# Patient Record
Sex: FEMALE | Race: WHITE | NOT HISPANIC OR LATINO | Employment: FULL TIME | URBAN - METROPOLITAN AREA
[De-identification: names, ages, dates, MRNs, and addresses within clinical notes are randomized per-mention and may not be internally consistent; named-entity substitution may affect disease eponyms.]

---

## 2018-06-12 ENCOUNTER — LAB REQUISITION (OUTPATIENT)
Dept: LAB | Facility: HOSPITAL | Age: 33
End: 2018-06-12
Payer: COMMERCIAL

## 2018-06-12 ENCOUNTER — HOSPITAL ENCOUNTER (OUTPATIENT)
Dept: OTHER | Facility: HOSPITAL | Age: 33
Setting detail: OUTPATIENT SURGERY
Discharge: HOME | End: 2018-06-12
Attending: PEDIATRICS

## 2018-06-12 DIAGNOSIS — K64.2 THIRD DEGREE HEMORRHOIDS: ICD-10-CM

## 2018-06-12 LAB
PREGNANCY TEST URINE (HISTORICAL): NEGATIVE
SP GR UR STRIP.AUTO: 1.02 (ref 1–1.03)
SURGICAL PATHOLOGY (HISTORICAL): NORMAL

## 2018-06-12 PROCEDURE — 88304 TISSUE EXAM BY PATHOLOGIST: CPT | Performed by: PATHOLOGY

## 2018-10-11 ENCOUNTER — TELEPHONE (OUTPATIENT)
Dept: SURGERY | Facility: CLINIC | Age: 33
End: 2018-10-11

## 2018-11-27 NOTE — TELEPHONE ENCOUNTER
Left message for patient to call the office re: her procedure (c-scope she cx for 10/25/2018) to see if she wanted to reschedule  If it is not rescheduled prior to 12/06/2018 patient will need to come back in to be seen and resign consent

## 2019-09-19 ENCOUNTER — HOSPITAL ENCOUNTER (EMERGENCY)
Facility: HOSPITAL | Age: 34
Discharge: HOME | End: 2019-09-19
Attending: EMERGENCY MEDICINE

## 2019-09-19 ENCOUNTER — APPOINTMENT (EMERGENCY)
Dept: CT IMAGING | Facility: HOSPITAL | Age: 34
End: 2019-09-19

## 2019-09-19 VITALS
TEMPERATURE: 98.5 F | OXYGEN SATURATION: 99 % | WEIGHT: 106 LBS | RESPIRATION RATE: 18 BRPM | SYSTOLIC BLOOD PRESSURE: 107 MMHG | HEART RATE: 77 BPM | HEIGHT: 59 IN | DIASTOLIC BLOOD PRESSURE: 58 MMHG | BODY MASS INDEX: 21.37 KG/M2

## 2019-09-19 DIAGNOSIS — R11.0 NAUSEA: ICD-10-CM

## 2019-09-19 DIAGNOSIS — R10.9 LEFT FLANK PAIN: Primary | ICD-10-CM

## 2019-09-19 DIAGNOSIS — N83.209 RUPTURED OVARIAN CYST: ICD-10-CM

## 2019-09-19 LAB
ALBUMIN SERPL BCP-MCNC: 4 G/DL (ref 3.5–5)
ALP SERPL-CCNC: 43 U/L (ref 46–116)
ALT SERPL W P-5'-P-CCNC: 8 U/L (ref 12–78)
ANION GAP SERPL CALCULATED.3IONS-SCNC: 9 MMOL/L (ref 4–13)
AST SERPL W P-5'-P-CCNC: 8 U/L (ref 5–45)
BASOPHILS # BLD AUTO: 0.06 THOUSANDS/ΜL (ref 0–0.1)
BASOPHILS NFR BLD AUTO: 1 % (ref 0–1)
BILIRUB SERPL-MCNC: 0.3 MG/DL (ref 0.2–1)
BILIRUB UR QL STRIP: NEGATIVE
BUN SERPL-MCNC: 9 MG/DL (ref 5–25)
CALCIUM SERPL-MCNC: 8.7 MG/DL (ref 8.3–10.1)
CHLORIDE SERPL-SCNC: 102 MMOL/L (ref 100–108)
CLARITY UR: NORMAL
CO2 SERPL-SCNC: 25 MMOL/L (ref 21–32)
COLOR UR: NORMAL
CREAT SERPL-MCNC: 0.69 MG/DL (ref 0.6–1.3)
EOSINOPHIL # BLD AUTO: 0.56 THOUSAND/ΜL (ref 0–0.61)
EOSINOPHIL NFR BLD AUTO: 9 % (ref 0–6)
ERYTHROCYTE [DISTWIDTH] IN BLOOD BY AUTOMATED COUNT: 15.9 % (ref 11.6–15.1)
EXT PREG TEST URINE: NEGATIVE
EXT. CONTROL ED NAV: NORMAL
GFR SERPL CREATININE-BSD FRML MDRD: 115 ML/MIN/1.73SQ M
GLUCOSE SERPL-MCNC: 96 MG/DL (ref 65–140)
GLUCOSE UR STRIP-MCNC: NEGATIVE MG/DL
HCT VFR BLD AUTO: 30.2 % (ref 34.8–46.1)
HGB BLD-MCNC: 9.3 G/DL (ref 11.5–15.4)
HGB UR QL STRIP.AUTO: NEGATIVE
IMM GRANULOCYTES # BLD AUTO: 0.02 THOUSAND/UL (ref 0–0.2)
IMM GRANULOCYTES NFR BLD AUTO: 0 % (ref 0–2)
KETONES UR STRIP-MCNC: NEGATIVE MG/DL
LEUKOCYTE ESTERASE UR QL STRIP: NEGATIVE
LIPASE SERPL-CCNC: 119 U/L (ref 73–393)
LYMPHOCYTES # BLD AUTO: 1.99 THOUSANDS/ΜL (ref 0.6–4.47)
LYMPHOCYTES NFR BLD AUTO: 33 % (ref 14–44)
MCH RBC QN AUTO: 24 PG (ref 26.8–34.3)
MCHC RBC AUTO-ENTMCNC: 30.8 G/DL (ref 31.4–37.4)
MCV RBC AUTO: 78 FL (ref 82–98)
MONOCYTES # BLD AUTO: 0.69 THOUSAND/ΜL (ref 0.17–1.22)
MONOCYTES NFR BLD AUTO: 11 % (ref 4–12)
NEUTROPHILS # BLD AUTO: 2.79 THOUSANDS/ΜL (ref 1.85–7.62)
NEUTS SEG NFR BLD AUTO: 46 % (ref 43–75)
NITRITE UR QL STRIP: NEGATIVE
NRBC BLD AUTO-RTO: 0 /100 WBCS
PH UR STRIP.AUTO: 6.5 [PH]
PLATELET # BLD AUTO: 233 THOUSANDS/UL (ref 149–390)
PMV BLD AUTO: 9.8 FL (ref 8.9–12.7)
POTASSIUM SERPL-SCNC: 3.7 MMOL/L (ref 3.5–5.3)
PROT SERPL-MCNC: 7.5 G/DL (ref 6.4–8.2)
PROT UR STRIP-MCNC: NEGATIVE MG/DL
RBC # BLD AUTO: 3.88 MILLION/UL (ref 3.81–5.12)
SODIUM SERPL-SCNC: 136 MMOL/L (ref 136–145)
SP GR UR STRIP.AUTO: <=1.005 (ref 1–1.03)
TSH SERPL DL<=0.05 MIU/L-ACNC: 381.52 UIU/ML (ref 0.36–3.74)
UROBILINOGEN UR QL STRIP.AUTO: 0.2 E.U./DL
WBC # BLD AUTO: 6.11 THOUSAND/UL (ref 4.31–10.16)

## 2019-09-19 PROCEDURE — 96374 THER/PROPH/DIAG INJ IV PUSH: CPT

## 2019-09-19 PROCEDURE — 81025 URINE PREGNANCY TEST: CPT

## 2019-09-19 PROCEDURE — 99284 EMERGENCY DEPT VISIT MOD MDM: CPT

## 2019-09-19 PROCEDURE — 96375 TX/PRO/DX INJ NEW DRUG ADDON: CPT

## 2019-09-19 PROCEDURE — 81003 URINALYSIS AUTO W/O SCOPE: CPT

## 2019-09-19 PROCEDURE — 99284 EMERGENCY DEPT VISIT MOD MDM: CPT | Performed by: EMERGENCY MEDICINE

## 2019-09-19 PROCEDURE — C9113 INJ PANTOPRAZOLE SODIUM, VIA: HCPCS | Performed by: EMERGENCY MEDICINE

## 2019-09-19 PROCEDURE — 85025 COMPLETE CBC W/AUTO DIFF WBC: CPT

## 2019-09-19 PROCEDURE — 83690 ASSAY OF LIPASE: CPT | Performed by: EMERGENCY MEDICINE

## 2019-09-19 PROCEDURE — 36415 COLL VENOUS BLD VENIPUNCTURE: CPT

## 2019-09-19 PROCEDURE — 84443 ASSAY THYROID STIM HORMONE: CPT | Performed by: EMERGENCY MEDICINE

## 2019-09-19 PROCEDURE — 80053 COMPREHEN METABOLIC PANEL: CPT

## 2019-09-19 PROCEDURE — 74177 CT ABD & PELVIS W/CONTRAST: CPT

## 2019-09-19 PROCEDURE — 96361 HYDRATE IV INFUSION ADD-ON: CPT

## 2019-09-19 RX ORDER — ONDANSETRON 4 MG/1
4 TABLET, ORALLY DISINTEGRATING ORAL EVERY 6 HOURS PRN
Qty: 20 TABLET | Refills: 0 | Status: SHIPPED | OUTPATIENT
Start: 2019-09-19 | End: 2020-10-02 | Stop reason: ALTCHOICE

## 2019-09-19 RX ORDER — KETOROLAC TROMETHAMINE 30 MG/ML
15 INJECTION, SOLUTION INTRAMUSCULAR; INTRAVENOUS ONCE
Status: COMPLETED | OUTPATIENT
Start: 2019-09-19 | End: 2019-09-19

## 2019-09-19 RX ORDER — ONDANSETRON 2 MG/ML
4 INJECTION INTRAMUSCULAR; INTRAVENOUS ONCE
Status: COMPLETED | OUTPATIENT
Start: 2019-09-19 | End: 2019-09-19

## 2019-09-19 RX ORDER — NAPROXEN 500 MG/1
500 TABLET ORAL 2 TIMES DAILY WITH MEALS
Qty: 30 TABLET | Refills: 0 | Status: SHIPPED | OUTPATIENT
Start: 2019-09-19 | End: 2021-06-29

## 2019-09-19 RX ORDER — PANTOPRAZOLE SODIUM 40 MG/1
40 INJECTION, POWDER, FOR SOLUTION INTRAVENOUS ONCE
Status: COMPLETED | OUTPATIENT
Start: 2019-09-19 | End: 2019-09-19

## 2019-09-19 RX ADMIN — IOHEXOL 100 ML: 350 INJECTION, SOLUTION INTRAVENOUS at 19:46

## 2019-09-19 RX ADMIN — KETOROLAC TROMETHAMINE 15 MG: 30 INJECTION, SOLUTION INTRAMUSCULAR at 21:54

## 2019-09-19 RX ADMIN — PANTOPRAZOLE SODIUM 40 MG: 40 INJECTION, POWDER, LYOPHILIZED, FOR SOLUTION INTRAVENOUS at 20:01

## 2019-09-19 RX ADMIN — ONDANSETRON 4 MG: 2 INJECTION INTRAMUSCULAR; INTRAVENOUS at 20:48

## 2019-09-19 RX ADMIN — SODIUM CHLORIDE 1000 ML: 0.9 INJECTION, SOLUTION INTRAVENOUS at 20:00

## 2019-09-19 NOTE — ED PROVIDER NOTES
History  Chief Complaint   Patient presents with    Flank Pain     Patient c/o left sided flank pain that started 3 days ago  Patient is c/o nausea  27-year-old female presents with left-sided flank pain that started 3 days ago, she states that hurts every time she is up and moving around, feels better when she sits and relaxes  Denies urinary discomfort, denies change in bowel habits, no fevers or chills, nausea without vomiting  Decreased appetite  No diarrhea or constipation  Denies trauma to the abdomen  Denies abdominal surgery in the past   Last menstrual period was approximately 1 month ago, and it was normal           None       Past Medical History:   Diagnosis Date    Cardiac disease     pulmonary stenosis    Disease of thyroid gland     hypothyroidism       Past Surgical History:   Procedure Laterality Date    CARDIAC SURGERY      pulmonary stenosis       History reviewed  No pertinent family history  I have reviewed and agree with the history as documented  Social History     Tobacco Use    Smoking status: Never Smoker    Smokeless tobacco: Never Used   Substance Use Topics    Alcohol use: Yes     Comment: social    Drug use: Never        Review of Systems   Constitutional: Positive for appetite change ( decreased)  Negative for chills, fatigue and fever  HENT: Negative for congestion and sore throat  Respiratory: Negative for apnea, cough, chest tightness and shortness of breath  Cardiovascular: Negative for chest pain and palpitations  Gastrointestinal: Positive for abdominal pain (Left flank) and nausea  Negative for constipation, diarrhea and vomiting  Genitourinary: Positive for flank pain  Negative for dysuria, genital sores, pelvic pain, vaginal bleeding, vaginal discharge and vaginal pain  Musculoskeletal: Negative for back pain and neck pain  Skin: Negative for color change and rash  Allergic/Immunologic: Negative for immunocompromised state  Neurological: Negative for dizziness, syncope and headaches  Physical Exam  Physical Exam   Constitutional: She is oriented to person, place, and time  She appears well-developed and well-nourished  No distress  HENT:   Head: Normocephalic and atraumatic  Mouth/Throat: Oropharynx is clear and moist    Eyes: Pupils are equal, round, and reactive to light  Conjunctivae and EOM are normal  Right eye exhibits no discharge  Left eye exhibits no discharge  No scleral icterus  Neck: Normal range of motion  Neck supple  No JVD present  Cardiovascular: Normal rate, regular rhythm, normal heart sounds and intact distal pulses  Exam reveals no gallop and no friction rub  No murmur heard  Pulmonary/Chest: Effort normal and breath sounds normal  No respiratory distress  She has no wheezes  She has no rales  She exhibits no tenderness  Abdominal: Soft  Bowel sounds are normal  She exhibits no distension  There is tenderness (Left flank)  There is no guarding  Musculoskeletal: Normal range of motion  She exhibits no edema, tenderness or deformity  Neurological: She is alert and oriented to person, place, and time  No cranial nerve deficit  Skin: Skin is warm and dry  No rash noted  She is not diaphoretic  No erythema  No pallor  Psychiatric: She has a normal mood and affect  Her behavior is normal    Vitals reviewed        Vital Signs  ED Triage Vitals   Temperature Pulse Respirations Blood Pressure SpO2   09/19/19 1814 09/19/19 1814 09/19/19 1814 09/19/19 1814 09/19/19 1814   98 5 °F (36 9 °C) 77 18 107/58 99 %      Temp Source Heart Rate Source Patient Position - Orthostatic VS BP Location FiO2 (%)   09/19/19 1814 09/19/19 1814 09/19/19 1814 09/19/19 1814 --   Oral Monitor Sitting Left arm       Pain Score       09/19/19 1827       4           Vitals:    09/19/19 1814   BP: 107/58   Pulse: 77   Patient Position - Orthostatic VS: Sitting         Visual Acuity      ED Medications  Medications pantoprazole (PROTONIX) injection 40 mg (40 mg Intravenous Given 9/19/19 2001)   sodium chloride 0 9 % bolus 1,000 mL (0 mL Intravenous Stopped 9/19/19 2152)   iohexol (OMNIPAQUE) 350 MG/ML injection (MULTI-DOSE) 100 mL (100 mL Intravenous Given 9/19/19 1946)   ondansetron (ZOFRAN) injection 4 mg (4 mg Intravenous Given 9/19/19 2048)   ketorolac (TORADOL) injection 15 mg (15 mg Intravenous Given 9/19/19 2154)       Diagnostic Studies  Results Reviewed     Procedure Component Value Units Date/Time    TSH [[id number]]  (Abnormal) Collected:  09/19/19 1852    Lab Status:  Final result Specimen:  Blood from Arm, Right Updated:  09/19/19 2350     TSH 3RD GENERATON 381 525 uIU/mL     Narrative:       Patients undergoing fluorescein dye angiography may retain small amounts of fluorescein in the body for 48-72 hours post procedure  Samples containing fluorescein can produce falsely depressed TSH values  If the patient had this procedure,a specimen should be resubmitted post fluorescein clearance        Lipase [[id number]]  (Normal) Collected:  09/19/19 1852    Lab Status:  Final result Specimen:  Blood from Arm, Right Updated:  09/19/19 1944     Lipase 119 u/L     POCT pregnancy, urine [[id number]]  (Normal) Resulted:  09/19/19 1920    Lab Status:  Final result Updated:  09/19/19 1920     EXT PREG TEST UR (Ref: Negative) negative     Control valid    Comprehensive metabolic panel [[id number]]  (Abnormal) Collected:  09/19/19 1852    Lab Status:  Final result Specimen:  Blood from Arm, Right Updated:  09/19/19 1918     Sodium 136 mmol/L      Potassium 3 7 mmol/L      Chloride 102 mmol/L      CO2 25 mmol/L      ANION GAP 9 mmol/L      BUN 9 mg/dL      Creatinine 0 69 mg/dL      Glucose 96 mg/dL      Calcium 8 7 mg/dL      AST 8 U/L      ALT 8 U/L      Alkaline Phosphatase 43 U/L      Total Protein 7 5 g/dL      Albumin 4 0 g/dL      Total Bilirubin 0 30 mg/dL      eGFR 115 ml/min/1 73sq m     Narrative:       National Kidney Disease Foundation guidelines for Chronic Kidney Disease (CKD):     Stage 1 with normal or high GFR (GFR > 90 mL/min/1 73 square meters)    Stage 2 Mild CKD (GFR = 60-89 mL/min/1 73 square meters)    Stage 3A Moderate CKD (GFR = 45-59 mL/min/1 73 square meters)    Stage 3B Moderate CKD (GFR = 30-44 mL/min/1 73 square meters)    Stage 4 Severe CKD (GFR = 15-29 mL/min/1 73 square meters)    Stage 5 End Stage CKD (GFR <15 mL/min/1 73 square meters)  Note: GFR calculation is accurate only with a steady state creatinine    UA w Reflex to Microscopic w Reflex to Culture [[id number]] Collected:  09/19/19 1852    Lab Status:  Final result Specimen:  Urine, Clean Catch Updated:  09/19/19 1900     Color, UA Light Yellow     Clarity, UA Slightly Cloudy     Specific Gravity, UA <=1 005     pH, UA 6 5     Leukocytes, UA Negative     Nitrite, UA Negative     Protein, UA Negative mg/dl      Glucose, UA Negative mg/dl      Ketones, UA Negative mg/dl      Urobilinogen, UA 0 2 E U /dl      Bilirubin, UA Negative     Blood, UA Negative    CBC and differential [[id number]]  (Abnormal) Collected:  09/19/19 1852    Lab Status:  Final result Specimen:  Blood from Arm, Right Updated:  09/19/19 1859     WBC 6 11 Thousand/uL      RBC 3 88 Million/uL      Hemoglobin 9 3 g/dL      Hematocrit 30 2 %      MCV 78 fL      MCH 24 0 pg      MCHC 30 8 g/dL      RDW 15 9 %      MPV 9 8 fL      Platelets 843 Thousands/uL      nRBC 0 /100 WBCs      Neutrophils Relative 46 %      Immat GRANS % 0 %      Lymphocytes Relative 33 %      Monocytes Relative 11 %      Eosinophils Relative 9 %      Basophils Relative 1 %      Neutrophils Absolute 2 79 Thousands/µL      Immature Grans Absolute 0 02 Thousand/uL      Lymphocytes Absolute 1 99 Thousands/µL      Monocytes Absolute 0 69 Thousand/µL      Eosinophils Absolute 0 56 Thousand/µL      Basophils Absolute 0 06 Thousands/µL                  CT abdomen pelvis with contrast   ED Interpretation by Tonia Cisneros DO Sandeep (09/19 2036)   No acute inflammatory changes in the abdomen or pelvis  A peripherally enhancing 17 mm linear left cystic structure is likely a recently ruptured physiologic follicle  Final Result by Demetra Aleman MD (09/19 2012)      No acute inflammatory changes in the abdomen or pelvis  A peripherally enhancing 17 mm linear left cystic structure is likely a recently ruptured physiologic follicle  Workstation performed: [id number]                    Procedures  Procedures       ED Course  ED Course as of Sep 20 1922   Thu Sep 19, 2019   2001 Lipase: 119   2052 Mild anemia, will advise patient for appropriate outpatient follow-up  Hemoglobin(!): 9 3                               MDM  Number of Diagnoses or Management Options  Left flank pain:   Nausea:   Ruptured ovarian cyst:   Diagnosis management comments: Abdominal pain, nausea  Will evaluate with abdominal lab work, urinalysis, abdominal imaging  Symptomatic treatment  Patient found to have ruptured L ovarian cyst - likely cause of pain  Also could have GI component w gastric/peptic ulcer and offered advice for these and follow up GI  Advised follow up OBGYN for cyst, endometriosis, and advised strict return precautions if worsening, or if signs of torsion, or worsening condition  Discussed with patient and they understood the risks and benefits of discharge  Patient had opportunity to ask questions regarding care and discharge instructions and had no further questions  Advised follow up with PCP, advised returning if worsening, and discussed disease specific return precautions  Patient understood discharge instructions  Patient asked for her thyroid to be checked and TSH is high - she will be advised of this and advised follow up w Endocrinology or PCP for further care             Amount and/or Complexity of Data Reviewed  Clinical lab tests: ordered and reviewed  Tests in the radiology section of CPT®: reviewed and ordered        Disposition  Final diagnoses:   Left flank pain   Ruptured ovarian cyst   Nausea     Time reflects when diagnosis was documented in both MDM as applicable and the Disposition within this note     Time User Action Codes Description Comment    9/19/2019  8:21 PM Coppersmith, Fabiola Dies Add [R10 9] Left flank pain     9/19/2019  9:48 PM Coppersmith, Fabiola Dies Add [N83 209] Ruptured ovarian cyst     9/19/2019  9:48 PM Coppersmith, June Dies Add [R11 0] Nausea       ED Disposition     ED Disposition Condition Date/Time Comment    Discharge Stable u Sep 19, 2019  9:48 PM Raynelle Holiday discharge to home/self care              Follow-up Information     Follow up With Specialties Details Why Contact Info Additional Information    [address] [facility] Emergency Department Emergency Medicine  If symptoms worsen [address] [facility] [address] ED, [place], [place], [address] [place] [address] Shae Vidal MD Obstetrics and Gynecology, Obstetrics, Gynecology Schedule an appointment as soon as possible for a visit  For follow up to ensure improvement, and for further testing and treatment as needed [facility] [facility]  [place] [address]       Artemio Kruger MD Gastroenterology Schedule an appointment as soon as possible for a visit  For follow up to ensure improvement, and for further testing and treatment as needed Todd Anglin [address] [place] [address] [phone]             Discharge Medication List as of 9/19/2019  9:51 PM      START taking these medications    Details   naproxen (NAPROSYN) 500 mg tablet Take 1 tablet (500 mg total) by mouth 2 (two) times a day with meals As needed for abdominal pain, Starting Thu 9/19/2019, Print      ondansetron (ZOFRAN-ODT) 4 mg disintegrating tablet Take 1 tablet (4 mg total) by mouth every 6 (six) hours as needed for nausea or vomiting, Starting Thu 9/19/2019, Print           No discharge procedures on file      ED Provider  Electronically Signed by           Patel Sadler,   09/20/19 Jan Payne DO  09/20/19 1922

## 2019-09-23 NOTE — RESULT ENCOUNTER NOTE
Calls to patient regarding elevated TSH  Left message, no response  Will send certified letter to patient  Will need to be informed of lab results and need outpatient follow up with endocrinology for further evaluation  Rosella Goldmann

## 2019-11-23 ENCOUNTER — TELEPHONE (OUTPATIENT)
Dept: EMERGENCY DEPT | Facility: HOSPITAL | Age: 34
End: 2019-11-23

## 2020-01-22 ENCOUNTER — OFFICE VISIT (OUTPATIENT)
Dept: OBGYN CLINIC | Facility: MEDICAL CENTER | Age: 35
End: 2020-01-22
Payer: COMMERCIAL

## 2020-01-22 VITALS — SYSTOLIC BLOOD PRESSURE: 100 MMHG | DIASTOLIC BLOOD PRESSURE: 62 MMHG | BODY MASS INDEX: 20.2 KG/M2 | WEIGHT: 100 LBS

## 2020-01-22 DIAGNOSIS — Z01.419 ENCOUNTER FOR GYNECOLOGICAL EXAMINATION (GENERAL) (ROUTINE) WITHOUT ABNORMAL FINDINGS: Primary | ICD-10-CM

## 2020-01-22 DIAGNOSIS — Z12.4 ENCOUNTER FOR PAPANICOLAOU SMEAR FOR CERVICAL CANCER SCREENING: ICD-10-CM

## 2020-01-22 DIAGNOSIS — E03.9 HYPOTHYROIDISM, UNSPECIFIED TYPE: ICD-10-CM

## 2020-01-22 DIAGNOSIS — Z30.09 GENERAL COUNSELLING AND ADVICE ON CONTRACEPTION: ICD-10-CM

## 2020-01-22 DIAGNOSIS — Z11.3 SCREENING FOR STD (SEXUALLY TRANSMITTED DISEASE): ICD-10-CM

## 2020-01-22 DIAGNOSIS — Z11.51 SCREENING FOR HPV (HUMAN PAPILLOMAVIRUS): ICD-10-CM

## 2020-01-22 PROCEDURE — 87624 HPV HI-RISK TYP POOLED RSLT: CPT | Performed by: NURSE PRACTITIONER

## 2020-01-22 PROCEDURE — 87591 N.GONORRHOEAE DNA AMP PROB: CPT | Performed by: NURSE PRACTITIONER

## 2020-01-22 PROCEDURE — G0145 SCR C/V CYTO,THINLAYER,RESCR: HCPCS | Performed by: NURSE PRACTITIONER

## 2020-01-22 PROCEDURE — S0610 ANNUAL GYNECOLOGICAL EXAMINA: HCPCS | Performed by: NURSE PRACTITIONER

## 2020-01-22 PROCEDURE — 87491 CHLMYD TRACH DNA AMP PROBE: CPT | Performed by: NURSE PRACTITIONER

## 2020-01-22 RX ORDER — MEDROXYPROGESTERONE ACETATE 150 MG/ML
150 INJECTION, SUSPENSION INTRAMUSCULAR
Qty: 1 ML | Refills: 4 | Status: SHIPPED | OUTPATIENT
Start: 2020-01-22 | End: 2020-09-03 | Stop reason: ALTCHOICE

## 2020-01-22 RX ORDER — LEVOTHYROXINE SODIUM 0.12 MG/1
125 TABLET ORAL DAILY
COMMUNITY
Start: 2016-04-12 | End: 2020-09-04

## 2020-01-22 RX ORDER — ESCITALOPRAM OXALATE 5 MG/1
5 TABLET ORAL
COMMUNITY
End: 2020-09-03

## 2020-01-23 LAB
HPV HR 12 DNA CVX QL NAA+PROBE: NEGATIVE
HPV16 DNA CVX QL NAA+PROBE: NEGATIVE
HPV18 DNA CVX QL NAA+PROBE: NEGATIVE

## 2020-01-24 LAB
C TRACH DNA SPEC QL NAA+PROBE: NEGATIVE
N GONORRHOEA DNA SPEC QL NAA+PROBE: NEGATIVE

## 2020-01-28 LAB
LAB AP GYN PRIMARY INTERPRETATION: NORMAL
Lab: NORMAL

## 2020-01-29 ENCOUNTER — CLINICAL SUPPORT (OUTPATIENT)
Dept: OBGYN CLINIC | Facility: MEDICAL CENTER | Age: 35
End: 2020-01-29
Payer: COMMERCIAL

## 2020-01-29 DIAGNOSIS — Z30.42 ENCOUNTER FOR DEPO-PROVERA CONTRACEPTION: Primary | ICD-10-CM

## 2020-01-29 LAB — SL AMB POCT URINE HCG: NEGATIVE

## 2020-01-29 PROCEDURE — 81025 URINE PREGNANCY TEST: CPT | Performed by: NURSE PRACTITIONER

## 2020-01-29 PROCEDURE — 96372 THER/PROPH/DIAG INJ SC/IM: CPT | Performed by: NURSE PRACTITIONER

## 2020-01-29 RX ORDER — MEDROXYPROGESTERONE ACETATE 150 MG/ML
150 INJECTION, SUSPENSION INTRAMUSCULAR
Status: DISCONTINUED | OUTPATIENT
Start: 2020-01-29 | End: 2020-09-03

## 2020-01-29 RX ADMIN — MEDROXYPROGESTERONE ACETATE 150 MG: 150 INJECTION, SUSPENSION INTRAMUSCULAR at 10:42

## 2020-01-29 NOTE — PROGRESS NOTES
Pt presents for her first depo injection  Urine HCG was negative  Depo given in her right buttock, she did well     Roshan Roper [address] [facility] [id number]  EXP 08/2021

## 2020-01-30 PROBLEM — I37.0 NONRHEUMATIC PULMONARY VALVE STENOSIS: Status: ACTIVE | Noted: 2020-01-30

## 2020-01-30 PROBLEM — Q87.19 NOONAN SYNDROME: Status: ACTIVE | Noted: 2020-01-30

## 2020-01-30 LAB
T4 FREE SERPL-MCNC: 0.3 NG/DL (ref 0.8–1.8)
TSH SERPL-ACNC: >150 MIU/L

## 2020-01-30 NOTE — ASSESSMENT & PLAN NOTE
Normal findings on routine gyn exam  Advised monthly SBE, annual CBE and baseline screening mammo at age 36  Reviewed ASCCP guidelines and recommended pap with cotesting q3 yrs for this low risk patient; this was collected today  STI testing was offered and the patient does agree to gc/chl; she reports low risk  Diet/activity recommendations:  Encouraged daily Ca++ and vitamin D intake as well as daily weight bearing exercise for promotion of bone health  RTO in one year or sooner PRN

## 2020-01-30 NOTE — PROGRESS NOTES
Assessment/Plan:    Hypothyroidism  Known hypothyroidism without recent compliance  Reviewed the importance of consistent management  Order provided for TSH and will advise dose adjustments as indicated  She plans to establish care with an [facility] PCP - recommendations provided for [employer]  General counselling and advice on contraception  This patient presents for contraceptive counseling  We discussed all options at length including barrier methods, combination estrogen progesterone methods (pill, patch and ring), progesterone only methods (pill, Depo, Nexplanon and IUD) and non-hormonal methods (paragard, NFP, sterilization)  We reviewed directions for use, Ses/AEs, risks and benefits  All questions were answered  She is aware that condoms will be advised with all sexual contact for prevention of STI  The patient's personal and FH were reviewed and she is without risk factors for VTE  The patient requests Depo provera  Recommended RTO for dose #1 with menses  She agrees with plan and will use condoms PRN in the meantime     Encounter for gynecological examination (general) (routine) without abnormal findings  Normal findings on routine gyn exam  Advised monthly SBE, annual CBE and baseline screening mammo at age 36  Reviewed ASCCP guidelines and recommended pap with cotesting q3 yrs for this low risk patient; this was collected today  STI testing was offered and the patient does agree to gc/chl; she reports low risk  Diet/activity recommendations:  Encouraged daily Ca++ and vitamin D intake as well as daily weight bearing exercise for promotion of bone health  RTO in one year or sooner PRN  Diagnoses and all orders for this visit:    Encounter for gynecological examination (general) (routine) without abnormal findings    Encounter for Papanicolaou smear for cervical cancer screening  -     Liquid-based pap, screening  -     HPV High Risk;  Future  -     HPV High Risk    Screening for HPV (human papillomavirus)  -     Liquid-based pap, screening  -     HPV High Risk; Future  -     HPV High Risk    Hypothyroidism, unspecified type  -     TSH, 3rd generation with Free T4 reflex; Future    General counselling and advice on contraception  -     medroxyPROGESTERone (DEPO-PROVERA) 150 mg/mL injection; Inject 1 mL (150 mg total) into a muscle every 3 (three) months    Screening for STD (sexually transmitted disease)  -     Chlamydia/GC amplified DNA by PCR    Other orders  -     escitalopram (LEXAPRO) 5 mg tablet; Take 5 mg by mouth  -     levothyroxine 125 mcg tablet; Take 125 mcg by mouth daily          Subjective:      Patient ID: Patric Payne is a 29 y o  female  This new patient presents for routine annual gyn exam  Transferring care from Dr Eugenie Riddle Mer is a 29 y o  G0  PMHx notable for [place] syndrome, pulmonary stenosis (multiple cardiac procedures), hypothyroidism, anxiety/depression  Gyn hx is benign - she denies abn pap or STI  FH neg for breast, ovarian or colon ca  Interested in discussing contraceptive options  Used plan B last week  Has tried Mirena, OCP and ring - thinking about Depo  She denies acute gyn complaints  Menses are regular and light to mod  She denies pelvic pain, breast concerns, abnormal discharge, bowel/bladder dysfunction  Monogamous  She denies STI concerns but open to testing  Works as a hairstylist       The following portions of the patient's history were reviewed and updated as appropriate: allergies, current medications, past family history, past medical history, past social history, past surgical history and problem list     Review of Systems   Constitutional: Negative  Respiratory: Negative  Cardiovascular: Negative  Gastrointestinal: Negative  Genitourinary: Negative  Musculoskeletal: Negative  Skin: Negative  Neurological: Negative  Psychiatric/Behavioral: Negative            Objective:      /62   Wt 45 4 kg (100 lb)   LMP 01/22/2020   BMI 20 20 kg/m²          Physical Exam   Constitutional: She is oriented to person, place, and time  She appears well-developed and well-nourished  HENT:   Head: Normocephalic and atraumatic  Eyes: Pupils are equal, round, and reactive to light  EOM are normal    Neck: Normal range of motion  Neck supple  No thyromegaly present  Cardiovascular: Normal rate, regular rhythm and normal heart sounds  Pulmonary/Chest: Effort normal and breath sounds normal  No respiratory distress  She has no wheezes  She has no rales  She exhibits deformity  She exhibits no mass and no tenderness  Right breast exhibits no inverted nipple, no mass, no nipple discharge, no skin change and no tenderness  Left breast exhibits no inverted nipple, no mass, no nipple discharge, no skin change and no tenderness  No breast tenderness or discharge  Breasts are symmetrical        Abdominal: Soft  She exhibits no distension and no mass  There is no splenomegaly or hepatomegaly  There is no tenderness  There is no rebound and no guarding  Genitourinary: Rectum normal, vagina normal and uterus normal  No breast tenderness or discharge  No labial fusion  There is no rash, tenderness, lesion or injury on the right labia  There is no rash, tenderness, lesion or injury on the left labia  Cervix exhibits no motion tenderness, no discharge and no friability  Right adnexum displays no mass, no tenderness and no fullness  Left adnexum displays no mass, no tenderness and no fullness  No erythema, tenderness or bleeding in the vagina  No foreign body in the vagina  No vaginal discharge found  Musculoskeletal: Normal range of motion  Lymphadenopathy:     She has no cervical adenopathy  She has no axillary adenopathy  Neurological: She is alert and oriented to person, place, and time  No cranial nerve deficit  Skin: Skin is warm and dry  No rash noted  No cyanosis  Nails show no clubbing     Psychiatric: She has a normal mood and affect   Her speech is normal and behavior is normal  Judgment and thought content normal  Cognition and memory are normal

## 2020-01-30 NOTE — ASSESSMENT & PLAN NOTE
Known hypothyroidism without recent compliance  Reviewed the importance of consistent management  Order provided for TSH and will advise dose adjustments as indicated  She plans to establish care with an [facility] PCP - recommendations provided for [employer]

## 2020-01-31 DIAGNOSIS — R79.89 ELEVATED TSH: ICD-10-CM

## 2020-01-31 DIAGNOSIS — E05.90 HYPERTHYROIDISM: Primary | ICD-10-CM

## 2020-01-31 RX ORDER — LEVOTHYROXINE SODIUM 0.12 MG/1
125 TABLET ORAL DAILY
Qty: 30 TABLET | Refills: 1 | Status: CANCELLED | OUTPATIENT
Start: 2020-01-31 | End: 2020-03-01

## 2020-01-31 NOTE — TELEPHONE ENCOUNTER
----- Message from Jessica Pittman, [address] sent at 1/31/2020 10:02 AM EST -----  TSH is very elevated   Please notify patient   She had reported recently restarting synthroid - please ask how long she has been taking 125mcg/d and advise repeating TSH 4 weeks later (please provide order for this)  Please encourage establishing care with PCP asap for further management   If she would like referral to Endocrine within [facility] we can provide that as well - please provide referral to Dr Cordova Player

## 2020-01-31 NOTE — TELEPHONE ENCOUNTER
Spoke with pt, informed re tsh, she will restart synthroid  And repeat tsh in 4 weeks  Referral sent for endocrinology

## 2020-02-03 RX ORDER — LEVOTHYROXINE SODIUM 0.12 MG/1
125 TABLET ORAL DAILY
Qty: 30 TABLET | Refills: 10 | Status: CANCELLED | OUTPATIENT
Start: 2020-02-03 | End: 2020-03-04

## 2020-03-03 ENCOUNTER — TELEPHONE (OUTPATIENT)
Dept: OBGYN CLINIC | Facility: CLINIC | Age: 35
End: 2020-03-03

## 2020-03-03 NOTE — TELEPHONE ENCOUNTER
Pt started on depo provera about a month ago and has been lightly spotting every day  Please Advise

## 2020-04-02 ENCOUNTER — TELEPHONE (OUTPATIENT)
Dept: OBGYN CLINIC | Facility: CLINIC | Age: 35
End: 2020-04-02

## 2020-04-09 ENCOUNTER — TELEMEDICINE (OUTPATIENT)
Dept: OBGYN CLINIC | Facility: MEDICAL CENTER | Age: 35
End: 2020-04-09
Payer: COMMERCIAL

## 2020-04-09 DIAGNOSIS — Z30.09 GENERAL COUNSELING AND ADVICE FOR CONTRACEPTIVE MANAGEMENT: ICD-10-CM

## 2020-04-09 DIAGNOSIS — Z30.09 GENERAL COUNSELLING AND ADVICE ON CONTRACEPTION: Primary | ICD-10-CM

## 2020-04-09 DIAGNOSIS — E03.9 HYPOTHYROIDISM, UNSPECIFIED TYPE: ICD-10-CM

## 2020-04-09 PROCEDURE — 99213 OFFICE O/P EST LOW 20 MIN: CPT | Performed by: NURSE PRACTITIONER

## 2020-04-09 RX ORDER — NORETHINDRONE ACETATE AND ETHINYL ESTRADIOL AND FERROUS FUMARATE 1MG-20(21)
KIT ORAL
Qty: 90 TABLET | Refills: 4 | Status: SHIPPED | OUTPATIENT
Start: 2020-04-09 | End: 2021-06-29

## 2020-09-03 ENCOUNTER — APPOINTMENT (OUTPATIENT)
Dept: LAB | Facility: MEDICAL CENTER | Age: 35
End: 2020-09-03
Payer: COMMERCIAL

## 2020-09-03 ENCOUNTER — APPOINTMENT (OUTPATIENT)
Dept: RADIOLOGY | Facility: MEDICAL CENTER | Age: 35
End: 2020-09-03
Payer: COMMERCIAL

## 2020-09-03 ENCOUNTER — OFFICE VISIT (OUTPATIENT)
Dept: FAMILY MEDICINE CLINIC | Facility: MEDICAL CENTER | Age: 35
End: 2020-09-03
Payer: COMMERCIAL

## 2020-09-03 VITALS
HEART RATE: 67 BPM | TEMPERATURE: 98.4 F | BODY MASS INDEX: 19.63 KG/M2 | SYSTOLIC BLOOD PRESSURE: 104 MMHG | DIASTOLIC BLOOD PRESSURE: 68 MMHG | WEIGHT: 104 LBS | HEIGHT: 61 IN

## 2020-09-03 DIAGNOSIS — M54.16 LUMBAR BACK PAIN WITH RADICULOPATHY AFFECTING LEFT LOWER EXTREMITY: ICD-10-CM

## 2020-09-03 DIAGNOSIS — M54.16 LUMBAR BACK PAIN WITH RADICULOPATHY AFFECTING RIGHT LOWER EXTREMITY: ICD-10-CM

## 2020-09-03 DIAGNOSIS — Z13.1 SCREENING FOR DIABETES MELLITUS: ICD-10-CM

## 2020-09-03 DIAGNOSIS — Z00.00 PHYSICAL EXAM, ANNUAL: ICD-10-CM

## 2020-09-03 DIAGNOSIS — E03.9 HYPOTHYROIDISM, UNSPECIFIED TYPE: ICD-10-CM

## 2020-09-03 DIAGNOSIS — G89.29 CHRONIC BILATERAL THORACIC BACK PAIN: ICD-10-CM

## 2020-09-03 DIAGNOSIS — Z23 ENCOUNTER FOR IMMUNIZATION: ICD-10-CM

## 2020-09-03 DIAGNOSIS — Z11.59 NEED FOR HEPATITIS C SCREENING TEST: ICD-10-CM

## 2020-09-03 DIAGNOSIS — F32.9 MAJOR DEPRESSIVE DISORDER, REMISSION STATUS UNSPECIFIED, UNSPECIFIED WHETHER RECURRENT: Primary | ICD-10-CM

## 2020-09-03 DIAGNOSIS — Z13.220 SCREENING FOR LIPID DISORDERS: ICD-10-CM

## 2020-09-03 DIAGNOSIS — Z11.4 SCREENING FOR HIV (HUMAN IMMUNODEFICIENCY VIRUS): ICD-10-CM

## 2020-09-03 DIAGNOSIS — R79.89 ELEVATED TSH: ICD-10-CM

## 2020-09-03 DIAGNOSIS — M54.2 CERVICAL PAIN (NECK): ICD-10-CM

## 2020-09-03 DIAGNOSIS — Q87.19 NOONAN SYNDROME: ICD-10-CM

## 2020-09-03 DIAGNOSIS — F41.9 ANXIETY: ICD-10-CM

## 2020-09-03 DIAGNOSIS — Q24.9 CONGENITAL HEART DISEASE: ICD-10-CM

## 2020-09-03 DIAGNOSIS — M54.6 CHRONIC BILATERAL THORACIC BACK PAIN: ICD-10-CM

## 2020-09-03 DIAGNOSIS — H02.402 DROOPY EYELID, LEFT: ICD-10-CM

## 2020-09-03 PROBLEM — F32.A DEPRESSION: Status: ACTIVE | Noted: 2020-09-03

## 2020-09-03 LAB
ALBUMIN SERPL BCP-MCNC: 4.2 G/DL (ref 3.5–5)
ALP SERPL-CCNC: 29 U/L (ref 46–116)
ALT SERPL W P-5'-P-CCNC: 17 U/L (ref 12–78)
ANION GAP SERPL CALCULATED.3IONS-SCNC: 6 MMOL/L (ref 4–13)
AST SERPL W P-5'-P-CCNC: 11 U/L (ref 5–45)
BILIRUB SERPL-MCNC: 0.47 MG/DL (ref 0.2–1)
BUN SERPL-MCNC: 14 MG/DL (ref 5–25)
CALCIUM SERPL-MCNC: 8.9 MG/DL (ref 8.3–10.1)
CHLORIDE SERPL-SCNC: 107 MMOL/L (ref 100–108)
CHOLEST SERPL-MCNC: 262 MG/DL (ref 50–200)
CO2 SERPL-SCNC: 25 MMOL/L (ref 21–32)
CREAT SERPL-MCNC: 0.85 MG/DL (ref 0.6–1.3)
EST. AVERAGE GLUCOSE BLD GHB EST-MCNC: 131 MG/DL
GFR SERPL CREATININE-BSD FRML MDRD: 90 ML/MIN/1.73SQ M
GLUCOSE P FAST SERPL-MCNC: 80 MG/DL (ref 65–99)
HBA1C MFR BLD: 6.2 %
HCV AB SER QL: NORMAL
HDLC SERPL-MCNC: 53 MG/DL
LDLC SERPL CALC-MCNC: 183 MG/DL (ref 0–100)
POTASSIUM SERPL-SCNC: 3.7 MMOL/L (ref 3.5–5.3)
PROT SERPL-MCNC: 8.1 G/DL (ref 6.4–8.2)
SODIUM SERPL-SCNC: 138 MMOL/L (ref 136–145)
T4 FREE SERPL-MCNC: 0.34 NG/DL (ref 0.76–1.46)
TRIGL SERPL-MCNC: 130 MG/DL
TSH SERPL DL<=0.05 MIU/L-ACNC: 295 UIU/ML (ref 0.36–3.74)

## 2020-09-03 PROCEDURE — 80053 COMPREHEN METABOLIC PANEL: CPT

## 2020-09-03 PROCEDURE — 72072 X-RAY EXAM THORAC SPINE 3VWS: CPT

## 2020-09-03 PROCEDURE — 72110 X-RAY EXAM L-2 SPINE 4/>VWS: CPT

## 2020-09-03 PROCEDURE — 86376 MICROSOMAL ANTIBODY EACH: CPT

## 2020-09-03 PROCEDURE — 99204 OFFICE O/P NEW MOD 45 MIN: CPT | Performed by: FAMILY MEDICINE

## 2020-09-03 PROCEDURE — 36415 COLL VENOUS BLD VENIPUNCTURE: CPT

## 2020-09-03 PROCEDURE — 72050 X-RAY EXAM NECK SPINE 4/5VWS: CPT

## 2020-09-03 PROCEDURE — 84443 ASSAY THYROID STIM HORMONE: CPT

## 2020-09-03 PROCEDURE — 99395 PREV VISIT EST AGE 18-39: CPT | Performed by: FAMILY MEDICINE

## 2020-09-03 PROCEDURE — 80061 LIPID PANEL: CPT

## 2020-09-03 PROCEDURE — 87389 HIV-1 AG W/HIV-1&-2 AB AG IA: CPT

## 2020-09-03 PROCEDURE — 84439 ASSAY OF FREE THYROXINE: CPT

## 2020-09-03 PROCEDURE — 83036 HEMOGLOBIN GLYCOSYLATED A1C: CPT

## 2020-09-03 PROCEDURE — 86800 THYROGLOBULIN ANTIBODY: CPT

## 2020-09-03 PROCEDURE — 86803 HEPATITIS C AB TEST: CPT

## 2020-09-03 RX ORDER — ESCITALOPRAM OXALATE 5 MG/1
5 TABLET ORAL DAILY
Qty: 30 TABLET | Refills: 1 | Status: SHIPPED | OUTPATIENT
Start: 2020-09-03 | End: 2020-10-29

## 2020-09-03 RX ORDER — RANITIDINE 150 MG/1
1 TABLET ORAL 2 TIMES DAILY
COMMUNITY
Start: 2016-04-12 | End: 2020-09-03 | Stop reason: ALTCHOICE

## 2020-09-03 NOTE — PROGRESS NOTES
Assessment/Plan:       Diagnoses and all orders for this visit:      Physical exam, annual  -     Lipid Panel with Direct LDL reflex; Future  -     Comprehensive metabolic panel; Future  -     Hemoglobin A1C; Future  -     TSH, 3rd generation; Future  -     T4, free; Future  72-year-old female presenting for a physical exam   Continue regular follow-up with Gynecology  Healthy diet with junk food avoidance recommended  Continue with physical activity  Continue avoidance of tobacco and drugs  Current amount of alcohol use acceptable  Screening for lipid disorders  -     Lipid Panel with Direct LDL reflex; Future  Screening for diabetes mellitus  -     Comprehensive metabolic panel; Future  -     Hemoglobin A1C; Future  Check labs to screen for lipid disorders and diabetes  Need for hepatitis C screening test  -     Hepatitis C antibody; Future  Screening for HIV (human immunodeficiency virus)  -     HIV 1/2 Antigen/Antibody (4th Generation) w Reflex [facility]; Future  Patient did give verbal consent for hep C and HIV screening  Encounter for immunization  Flu vaccine highly recommended at the end of September but no later than Thanksgiving due to congenital heart disease  Patient can schedule a visit in the office during one of our flu clinics or get the vaccine at her local pharmacy  [place] syndrome  Congenital heart disease  Continue regular follow-up with Cardiology  It appears patient has not seen cardiology in about two years  I encouraged her to make an appointment  Follow-up in one month or sooner if needed  Subjective:      Patient ID: Rod Ayala is a 29 y o  female  Patient presents for a physical exam   States she eats healthy but eats junk food as well  She states physically active  No tobacco or drug use  Social alcohol use  Patient does have a gynecologist   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    Patient did have cardiac surgery and is followed by Cardiology  Agreeable to blood work  The following portions of the patient's history were reviewed and updated as appropriate: She  has a past medical history of Cardiac disease, Disease of thyroid gland, [place] syndrome, and Pulmonary stenosis  She   Patient Active Problem List    Diagnosis Date Noted    Depression 09/03/2020    Droopy eyelid, left 09/03/2020    Cervical pain (neck) 09/03/2020    Chronic bilateral thoracic back pain 09/03/2020    Lumbar back pain with radiculopathy affecting left lower extremity 09/03/2020    Lumbar back pain with radiculopathy affecting right lower extremity 09/03/2020    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    Nonrheumatic pulmonary valve stenosis 01/30/2020    General counselling and advice on contraception 01/22/2020    Hypothyroidism 01/22/2020    Encounter for gynecological examination (general) (routine) without abnormal findings 01/22/2020    Congenital heart disease 09/06/2018    Anxiety 04/12/2016     She  has a past surgical history that includes Cardiac surgery  Her family history includes Diabetes in her father; Thyroid disease in her mother  She  reports that she has never smoked  She has never used smokeless tobacco  She reports current alcohol use  She reports that she does not use drugs    Current Outpatient Medications   Medication Sig Dispense Refill    JUNEL FE 1/20 1-20 MG-MCG per tablet Please specify directions, refills and quantity 90 tablet 4    naproxen (NAPROSYN) 500 mg tablet Take 1 tablet (500 mg total) by mouth 2 (two) times a day with meals As needed for abdominal pain 30 tablet 0    ondansetron (ZOFRAN-ODT) 4 mg disintegrating tablet Take 1 tablet (4 mg total) by mouth every 6 (six) hours as needed for nausea or vomiting 20 tablet 0    escitalopram (LEXAPRO) 5 mg tablet Take 1 tablet (5 mg total) by mouth daily 30 tablet 1    levothyroxine 125 mcg tablet Take 125 mcg by mouth daily       No current facility-administered medications for this visit  Current Outpatient Medications on File Prior to Visit   Medication Sig    JUNEL FE 1/20 1-20 MG-MCG per tablet Please specify directions, refills and quantity    naproxen (NAPROSYN) 500 mg tablet Take 1 tablet (500 mg total) by mouth 2 (two) times a day with meals As needed for abdominal pain    ondansetron (ZOFRAN-ODT) 4 mg disintegrating tablet Take 1 tablet (4 mg total) by mouth every 6 (six) hours as needed for nausea or vomiting    [DISCONTINUED] ranitidine (ZANTAC) 150 mg tablet Take 1 tablet by mouth 2 (two) times a day    levothyroxine 125 mcg tablet Take 125 mcg by mouth daily    [DISCONTINUED] escitalopram (LEXAPRO) 5 mg tablet Take 5 mg by mouth    [DISCONTINUED] medroxyPROGESTERone (DEPO-PROVERA) 150 mg/mL injection Inject 1 mL (150 mg total) into a muscle every 3 (three) months     Current Facility-Administered Medications on File Prior to Visit   Medication    [DISCONTINUED] medroxyPROGESTERone (DEPO-PROVERA) IM injection 150 mg     She is allergic to bee venom and pollen extract       Review of Systems   Constitutional: Negative for fever  HENT: Negative for ear pain, rhinorrhea and sore throat  Eyes: Positive for visual disturbance  Respiratory: Negative for shortness of breath  Cardiovascular: Negative for chest pain  Gastrointestinal: Negative for abdominal pain and blood in stool  Endocrine: Positive for cold intolerance  Genitourinary: Negative for dysuria and hematuria  Musculoskeletal: Positive for arthralgias, back pain, myalgias and neck pain  Skin: Negative for rash  Neurological: Positive for numbness  Negative for headaches  Psychiatric/Behavioral: Positive for dysphoric mood  The patient is nervous/anxious            Objective:      /68 (BP Location: Left arm, Patient Position: Sitting, Cuff Size: Adult)   Pulse 67   Temp 98 4 °F (36 9 °C)   Ht 5' 0 5" (1 537 m)   Wt 47 2 kg (104 lb)   BMI 19 98 kg/m²          Physical Exam  Constitutional:       Appearance: She is well-developed  Comments: No nose, mouth or throat complaints  Nose, mouth and throat not examined  Mask in place  COVID-19 pandemic  HENT:      Head: Normocephalic and atraumatic  Right Ear: Tympanic membrane, ear canal and external ear normal       Left Ear: Tympanic membrane, ear canal and external ear normal    Eyes:      Conjunctiva/sclera: Conjunctivae normal       Pupils: Pupils are equal, round, and reactive to light  Comments: Left eyelid droop  Neck:      Trachea: Trachea normal    Cardiovascular:      Rate and Rhythm: Normal rate and regular rhythm  Heart sounds: S1 normal and S2 normal  Murmur present  Pulmonary:      Effort: Pulmonary effort is normal       Breath sounds: Normal breath sounds  Chest:       Abdominal:      General: Bowel sounds are normal       Palpations: Abdomen is soft  Tenderness: There is no abdominal tenderness  Musculoskeletal: Normal range of motion  Cervical back: She exhibits spasm  Thoracic back: She exhibits spasm  Lumbar back: She exhibits spasm  Comments: Pronounced cervical lordosis with thoracic kyphosis  Skin:     General: Skin is warm and dry  Neurological:      Mental Status: She is alert and oriented to person, place, and time  Psychiatric:         Speech: Speech normal          Behavior: Behavior normal          Thought Content:  Thought content normal

## 2020-09-03 NOTE — PROGRESS NOTES
Assessment/Plan:       Diagnoses and all orders for this visit:    Major depressive disorder, remission status unspecified, unspecified whether recurrent  -     escitalopram (LEXAPRO) 5 mg tablet; Take 1 tablet (5 mg total) by mouth daily  -     Ambulatory referral to [facility]; Future  Depression not well controlled  Patient is not suicidal   I called her pharmacy and her last Lexapro refill was February of 2019  I informed patient she will need to take the medication every day in order for it to be effective  She acknowledged understanding  Restart Lexapro 5 mg daily  Counseling recommended as well and patient is agreeable  Referral placed for Behavioral Health through [facility]  Anxiety  -     escitalopram (LEXAPRO) 5 mg tablet; Take 1 tablet (5 mg total) by mouth daily  -     Ambulatory referral to [facility]; Future  Anxiety is not well controlled  Patient was instructed she will need to take the medication every day and not as needed to help with her anxiety  She was in agreement  Restart Lexapro 5 mg daily  Referral to behavior health as well  Hypothyroidism, unspecified type  -     TSH, 3rd generation; Future  -     T4, free; Future  -     Thyroid Antibodies Panel; Future  Check thyroid labs  Restart levothyroxine if needed based on results  Patient was educated today and had to take thyroid medication appropriately  She must take the medication 1st thing in the morning on an empty stomach  She must take the medication with a full 6 oz or more of water  Nothing else to drink but water for 1 hour  No additional medication for 1 hour  No eating for 1 hour  Patient knowledge understanding  Will follow up on lab results  Droopy eyelid, left  -     Ambulatory referral to Plastic Surgery; Future  Cervical pain (neck)  -     XR spine cervical complete 4 or 5 vw non injury;  Future  Chronic bilateral thoracic back pain  -     XR spine thoracic 3 vw; Future  Lumbar back pain with radiculopathy affecting left lower extremity  -     XR spine lumbar minimum 4 views non injury; Future  Lumbar back pain with radiculopathy affecting right lower extremity  -     XR spine lumbar minimum 4 views non injury; Future  I believe patient has drooping left eyelid is causing her neck pain, thoracic pain, lumbar pain and her leg symptoms  Due to the drooping of her eyelid she is leaning forward through her thoracic spine and also extending her cervical spine in order to be able to see me  Doing this chronically is likely putting her paravertebral muscles in the cervical region, thoracic region and lumbar region in to spasm thus causing pain  This is likely also adding to her radiculopathy as well  She would likely greatly benefit from surgery to correct the drooping eyelid  Therefore I will have her see Plastic surgery for evaluation  Follow-up in one month or sooner if needed  Subjective:      Patient ID: Mj Nolasco is a 29 y o  female  Patient presents to establish care  She complains of a left drooping eyelid  She states this has been from birth  It is affecting her ability to see  She would be interested in having surgery to correct the dripping of the eye  She also complains of back pain  Location is the upper, middle and lower back on both sides  Essentially she states her back hurts from the neck down  This has been going on for many years  Patient states she has had physical therapy and imaging  Physical therapy has not been helpful  Imaging has been unremarkable  The pain in her back radiates down both of her legs  She has leg numbness particularly if she sits too long  Her legs are restless in the evening  Leg discomfort improves with movement  She also tells me she has depression  No suicidal ideation  This has been going on for many years but has been worsening over the past 1-2 years    Patient states she is supposed to be on Lexapro but admits she is not taking the medication regularly  She last saw her PCP about two years ago  She also tells me she has anxiety  This has been going on for many years as well but also worsening the past 1-2 years  She finds that taking Lexapro as needed does help  Is running out of the prescription which she received about two years ago  She has hypothyroidism  She was diagnosed at the age of 25 or 23  She is supposed to be on levothyroxine but admits she has not been taking the medication for about two years  The following portions of the patient's history were reviewed and updated as appropriate: She  has a past medical history of Cardiac disease, Disease of thyroid gland, [place] syndrome, and Pulmonary stenosis  She   Patient Active Problem List    Diagnosis Date Noted    Depression 09/03/2020    Droopy eyelid, left 09/03/2020    Cervical pain (neck) 09/03/2020    Chronic bilateral thoracic back pain 09/03/2020    Lumbar back pain with radiculopathy affecting left lower extremity 09/03/2020    Lumbar back pain with radiculopathy affecting right lower extremity 09/03/2020    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    Nonrheumatic pulmonary valve stenosis 01/30/2020    General counselling and advice on contraception 01/22/2020    Hypothyroidism 01/22/2020    Encounter for gynecological examination (general) (routine) without abnormal findings 01/22/2020    Congenital heart disease 09/06/2018    Anxiety 04/12/2016     She  has a past surgical history that includes Cardiac surgery  Her family history includes Diabetes in her father; Thyroid disease in her mother  She  reports that she has never smoked  She has never used smokeless tobacco  She reports current alcohol use  She reports that she does not use drugs    Current Outpatient Medications   Medication Sig Dispense Refill    JUNEL FE 1/20 1-20 MG-MCG per tablet Please specify directions, refills and quantity 90 tablet 4    naproxen (NAPROSYN) 500 mg tablet Take 1 tablet (500 mg total) by mouth 2 (two) times a day with meals As needed for abdominal pain 30 tablet 0    ondansetron (ZOFRAN-ODT) 4 mg disintegrating tablet Take 1 tablet (4 mg total) by mouth every 6 (six) hours as needed for nausea or vomiting 20 tablet 0    escitalopram (LEXAPRO) 5 mg tablet Take 1 tablet (5 mg total) by mouth daily 30 tablet 1    levothyroxine 125 mcg tablet Take 125 mcg by mouth daily       No current facility-administered medications for this visit  Current Outpatient Medications on File Prior to Visit   Medication Sig    JUNEL FE 1/20 1-20 MG-MCG per tablet Please specify directions, refills and quantity    naproxen (NAPROSYN) 500 mg tablet Take 1 tablet (500 mg total) by mouth 2 (two) times a day with meals As needed for abdominal pain    ondansetron (ZOFRAN-ODT) 4 mg disintegrating tablet Take 1 tablet (4 mg total) by mouth every 6 (six) hours as needed for nausea or vomiting    [DISCONTINUED] ranitidine (ZANTAC) 150 mg tablet Take 1 tablet by mouth 2 (two) times a day    levothyroxine 125 mcg tablet Take 125 mcg by mouth daily    [DISCONTINUED] escitalopram (LEXAPRO) 5 mg tablet Take 5 mg by mouth    [DISCONTINUED] medroxyPROGESTERone (DEPO-PROVERA) 150 mg/mL injection Inject 1 mL (150 mg total) into a muscle every 3 (three) months     Current Facility-Administered Medications on File Prior to Visit   Medication    [DISCONTINUED] medroxyPROGESTERone (DEPO-PROVERA) IM injection 150 mg     She is allergic to bee venom and pollen extract       Review of Systems   Constitutional: Negative for fever  HENT: Negative for ear pain, rhinorrhea and sore throat  Eyes: Positive for visual disturbance  Respiratory: Negative for shortness of breath  Cardiovascular: Negative for chest pain  Gastrointestinal: Negative for abdominal pain and blood in stool  Endocrine: Positive for cold intolerance     Genitourinary: Negative for dysuria and hematuria  Musculoskeletal: Positive for arthralgias, back pain, myalgias and neck pain  Skin: Negative for rash  Neurological: Positive for numbness  Negative for headaches  Psychiatric/Behavioral: Positive for dysphoric mood  The patient is nervous/anxious  Objective:      /68 (BP Location: Left arm, Patient Position: Sitting, Cuff Size: Adult)   Pulse 67   Temp 98 4 °F (36 9 °C)   Ht 5' 0 5" (1 537 m)   Wt 47 2 kg (104 lb)   BMI 19 98 kg/m²          Physical Exam  Constitutional:       Appearance: She is well-developed  Comments: No nose, mouth or throat complaints  Nose, mouth and throat not examined  Mask in place  COVID-19 pandemic  HENT:      Head: Normocephalic and atraumatic  Right Ear: Tympanic membrane, ear canal and external ear normal       Left Ear: Tympanic membrane, ear canal and external ear normal    Eyes:      Conjunctiva/sclera: Conjunctivae normal       Pupils: Pupils are equal, round, and reactive to light  Comments: Left eyelid droop  Neck:      Trachea: Trachea normal    Cardiovascular:      Rate and Rhythm: Normal rate and regular rhythm  Heart sounds: S1 normal and S2 normal  Murmur present  Pulmonary:      Effort: Pulmonary effort is normal       Breath sounds: Normal breath sounds  Chest:       Abdominal:      General: Bowel sounds are normal       Palpations: Abdomen is soft  Tenderness: There is no abdominal tenderness  Musculoskeletal: Normal range of motion  Cervical back: She exhibits spasm  Thoracic back: She exhibits spasm  Lumbar back: She exhibits spasm  Comments: Pronounced cervical lordosis with thoracic kyphosis  Skin:     General: Skin is warm and dry  Neurological:      Mental Status: She is alert and oriented to person, place, and time  Psychiatric:         Speech: Speech normal          Behavior: Behavior normal          Thought Content:  Thought content normal

## 2020-09-04 ENCOUNTER — TELEPHONE (OUTPATIENT)
Dept: FAMILY MEDICINE CLINIC | Facility: MEDICAL CENTER | Age: 35
End: 2020-09-04

## 2020-09-04 DIAGNOSIS — E03.8 HYPOTHYROIDISM DUE TO HASHIMOTO'S THYROIDITIS: Primary | ICD-10-CM

## 2020-09-04 DIAGNOSIS — E06.3 HYPOTHYROIDISM DUE TO HASHIMOTO'S THYROIDITIS: Primary | ICD-10-CM

## 2020-09-04 PROBLEM — E78.5 HYPERLIPIDEMIA: Status: ACTIVE | Noted: 2020-09-04

## 2020-09-04 PROBLEM — R73.9 HYPERGLYCEMIA: Status: ACTIVE | Noted: 2020-09-04

## 2020-09-04 LAB
HIV 1+2 AB+HIV1 P24 AG SERPL QL IA: NORMAL
THYROGLOB AB SERPL-ACNC: 213.2 IU/ML (ref 0–0.9)
THYROPEROXIDASE AB SERPL-ACNC: 153 IU/ML (ref 0–34)

## 2020-09-04 RX ORDER — LEVOTHYROXINE SODIUM 0.07 MG/1
75 TABLET ORAL DAILY
Qty: 30 TABLET | Refills: 1 | Status: SHIPPED | OUTPATIENT
Start: 2020-09-04 | End: 2020-10-28

## 2020-10-02 ENCOUNTER — OFFICE VISIT (OUTPATIENT)
Dept: FAMILY MEDICINE CLINIC | Facility: MEDICAL CENTER | Age: 35
End: 2020-10-02
Payer: COMMERCIAL

## 2020-10-02 VITALS
DIASTOLIC BLOOD PRESSURE: 60 MMHG | WEIGHT: 100 LBS | BODY MASS INDEX: 18.88 KG/M2 | HEART RATE: 80 BPM | SYSTOLIC BLOOD PRESSURE: 100 MMHG | HEIGHT: 61 IN | TEMPERATURE: 97.1 F

## 2020-10-02 DIAGNOSIS — R73.9 HYPERGLYCEMIA: ICD-10-CM

## 2020-10-02 DIAGNOSIS — E03.8 HYPOTHYROIDISM DUE TO HASHIMOTO'S THYROIDITIS: ICD-10-CM

## 2020-10-02 DIAGNOSIS — Z23 IMMUNIZATION DUE: ICD-10-CM

## 2020-10-02 DIAGNOSIS — E06.3 HYPOTHYROIDISM DUE TO HASHIMOTO'S THYROIDITIS: ICD-10-CM

## 2020-10-02 DIAGNOSIS — F41.9 ANXIETY: ICD-10-CM

## 2020-10-02 DIAGNOSIS — E78.2 MIXED HYPERLIPIDEMIA: ICD-10-CM

## 2020-10-02 DIAGNOSIS — F32.9 MAJOR DEPRESSIVE DISORDER, REMISSION STATUS UNSPECIFIED, UNSPECIFIED WHETHER RECURRENT: Primary | ICD-10-CM

## 2020-10-02 DIAGNOSIS — Z11.1 PPD SCREENING TEST: ICD-10-CM

## 2020-10-02 PROCEDURE — 90686 IIV4 VACC NO PRSV 0.5 ML IM: CPT

## 2020-10-02 PROCEDURE — 90471 IMMUNIZATION ADMIN: CPT

## 2020-10-02 PROCEDURE — 1036F TOBACCO NON-USER: CPT | Performed by: FAMILY MEDICINE

## 2020-10-02 PROCEDURE — 86580 TB INTRADERMAL TEST: CPT

## 2020-10-02 PROCEDURE — 99214 OFFICE O/P EST MOD 30 MIN: CPT | Performed by: FAMILY MEDICINE

## 2020-10-05 ENCOUNTER — CLINICAL SUPPORT (OUTPATIENT)
Dept: FAMILY MEDICINE CLINIC | Facility: MEDICAL CENTER | Age: 35
End: 2020-10-05

## 2020-10-05 DIAGNOSIS — Z11.1 ENCOUNTER FOR PPD SKIN TEST READING: Primary | ICD-10-CM

## 2020-10-05 LAB
INDURATION: 0 MM
TB SKIN TEST: NEGATIVE

## 2020-10-27 ENCOUNTER — LAB (OUTPATIENT)
Dept: LAB | Facility: CLINIC | Age: 35
End: 2020-10-27
Payer: COMMERCIAL

## 2020-10-27 DIAGNOSIS — E06.3 HYPOTHYROIDISM DUE TO HASHIMOTO'S THYROIDITIS: ICD-10-CM

## 2020-10-27 DIAGNOSIS — E03.8 HYPOTHYROIDISM DUE TO HASHIMOTO'S THYROIDITIS: ICD-10-CM

## 2020-10-27 LAB
T4 FREE SERPL-MCNC: 1.16 NG/DL (ref 0.76–1.46)
TSH SERPL DL<=0.05 MIU/L-ACNC: 40.1 UIU/ML (ref 0.36–3.74)

## 2020-10-27 PROCEDURE — 36415 COLL VENOUS BLD VENIPUNCTURE: CPT

## 2020-10-27 PROCEDURE — 84439 ASSAY OF FREE THYROXINE: CPT

## 2020-10-27 PROCEDURE — 84443 ASSAY THYROID STIM HORMONE: CPT

## 2020-10-28 ENCOUNTER — TELEPHONE (OUTPATIENT)
Dept: FAMILY MEDICINE CLINIC | Facility: MEDICAL CENTER | Age: 35
End: 2020-10-28

## 2020-10-28 DIAGNOSIS — F41.9 ANXIETY: ICD-10-CM

## 2020-10-28 DIAGNOSIS — F32.9 MAJOR DEPRESSIVE DISORDER, REMISSION STATUS UNSPECIFIED, UNSPECIFIED WHETHER RECURRENT: ICD-10-CM

## 2020-10-28 DIAGNOSIS — E03.8 HYPOTHYROIDISM DUE TO HASHIMOTO'S THYROIDITIS: Primary | ICD-10-CM

## 2020-10-28 DIAGNOSIS — E06.3 HYPOTHYROIDISM DUE TO HASHIMOTO'S THYROIDITIS: Primary | ICD-10-CM

## 2020-10-28 RX ORDER — LEVOTHYROXINE SODIUM 88 UG/1
88 TABLET ORAL DAILY
Qty: 30 TABLET | Refills: 1 | Status: SHIPPED | OUTPATIENT
Start: 2020-10-28 | End: 2020-12-18 | Stop reason: SDUPTHER

## 2020-10-29 RX ORDER — ESCITALOPRAM OXALATE 5 MG/1
TABLET ORAL
Qty: 90 TABLET | Refills: 1 | Status: SHIPPED | OUTPATIENT
Start: 2020-10-29 | End: 2021-06-29

## 2020-11-21 DIAGNOSIS — E06.3 HYPOTHYROIDISM DUE TO HASHIMOTO'S THYROIDITIS: ICD-10-CM

## 2020-11-21 DIAGNOSIS — E03.8 HYPOTHYROIDISM DUE TO HASHIMOTO'S THYROIDITIS: ICD-10-CM

## 2020-11-24 RX ORDER — LEVOTHYROXINE SODIUM 88 UG/1
TABLET ORAL
Qty: 30 TABLET | Refills: 1 | OUTPATIENT
Start: 2020-11-24

## 2020-12-18 DIAGNOSIS — E03.8 HYPOTHYROIDISM DUE TO HASHIMOTO'S THYROIDITIS: ICD-10-CM

## 2020-12-18 DIAGNOSIS — E06.3 HYPOTHYROIDISM DUE TO HASHIMOTO'S THYROIDITIS: ICD-10-CM

## 2020-12-22 RX ORDER — LEVOTHYROXINE SODIUM 88 UG/1
88 TABLET ORAL DAILY
Qty: 30 TABLET | Refills: 0 | Status: SHIPPED | OUTPATIENT
Start: 2020-12-22 | End: 2021-01-18 | Stop reason: SDUPTHER

## 2021-01-12 DIAGNOSIS — E06.3 HYPOTHYROIDISM DUE TO HASHIMOTO'S THYROIDITIS: ICD-10-CM

## 2021-01-12 DIAGNOSIS — E03.8 HYPOTHYROIDISM DUE TO HASHIMOTO'S THYROIDITIS: ICD-10-CM

## 2021-01-13 NOTE — TELEPHONE ENCOUNTER
Pt aware and states she is going for the BW on Saturday and has enough medication until results come in

## 2021-01-16 ENCOUNTER — LAB (OUTPATIENT)
Dept: LAB | Facility: CLINIC | Age: 36
End: 2021-01-16
Payer: COMMERCIAL

## 2021-01-16 DIAGNOSIS — E06.3 HYPOTHYROIDISM DUE TO HASHIMOTO'S THYROIDITIS: ICD-10-CM

## 2021-01-16 DIAGNOSIS — E03.8 HYPOTHYROIDISM DUE TO HASHIMOTO'S THYROIDITIS: ICD-10-CM

## 2021-01-16 LAB
T4 FREE SERPL-MCNC: 0.97 NG/DL (ref 0.76–1.46)
TSH SERPL DL<=0.05 MIU/L-ACNC: 38.8 UIU/ML (ref 0.36–3.74)

## 2021-01-16 PROCEDURE — 84443 ASSAY THYROID STIM HORMONE: CPT

## 2021-01-16 PROCEDURE — 36415 COLL VENOUS BLD VENIPUNCTURE: CPT

## 2021-01-16 PROCEDURE — 84439 ASSAY OF FREE THYROXINE: CPT

## 2021-01-18 DIAGNOSIS — E03.8 HYPOTHYROIDISM DUE TO HASHIMOTO'S THYROIDITIS: ICD-10-CM

## 2021-01-18 DIAGNOSIS — E06.3 HYPOTHYROIDISM DUE TO HASHIMOTO'S THYROIDITIS: ICD-10-CM

## 2021-01-18 RX ORDER — LEVOTHYROXINE SODIUM 0.1 MG/1
100 TABLET ORAL DAILY
Qty: 30 TABLET | Refills: 1 | Status: SHIPPED | OUTPATIENT
Start: 2021-01-18 | End: 2021-03-17 | Stop reason: SDUPTHER

## 2021-01-18 RX ORDER — LEVOTHYROXINE SODIUM 88 UG/1
TABLET ORAL
Qty: 30 TABLET | Refills: 1 | OUTPATIENT
Start: 2021-01-18

## 2021-02-11 DIAGNOSIS — E03.8 HYPOTHYROIDISM DUE TO HASHIMOTO'S THYROIDITIS: ICD-10-CM

## 2021-02-11 DIAGNOSIS — E06.3 HYPOTHYROIDISM DUE TO HASHIMOTO'S THYROIDITIS: ICD-10-CM

## 2021-02-12 RX ORDER — LEVOTHYROXINE SODIUM 88 UG/1
TABLET ORAL
Qty: 30 TABLET | Refills: 0 | OUTPATIENT
Start: 2021-02-12

## 2021-02-12 RX ORDER — LEVOTHYROXINE SODIUM 0.1 MG/1
TABLET ORAL
Qty: 30 TABLET | Refills: 1 | OUTPATIENT
Start: 2021-02-12

## 2021-03-17 DIAGNOSIS — E03.8 HYPOTHYROIDISM DUE TO HASHIMOTO'S THYROIDITIS: ICD-10-CM

## 2021-03-17 DIAGNOSIS — E06.3 HYPOTHYROIDISM DUE TO HASHIMOTO'S THYROIDITIS: ICD-10-CM

## 2021-03-17 NOTE — TELEPHONE ENCOUNTER
Pt called to request Rx for levothyroxine    Please send to [facility] in [facility]

## 2021-03-18 NOTE — TELEPHONE ENCOUNTER
She is due for her repeat thyroid labs  Those labs were previously ordered and are in epic  Does she have enough medication?

## 2021-03-18 NOTE — TELEPHONE ENCOUNTER
She ran out yesterday   Was going to go Saturday  Told her to not do that now since her level will be off since not taking it  Please advise when she should get blood work done

## 2021-03-19 RX ORDER — LEVOTHYROXINE SODIUM 0.1 MG/1
100 TABLET ORAL DAILY
Qty: 30 TABLET | Refills: 1 | Status: SHIPPED | OUTPATIENT
Start: 2021-03-19 | End: 2021-04-10

## 2021-03-19 NOTE — TELEPHONE ENCOUNTER
Medication refilled for 30 days with one refill  She will now need to have her labs drawn in six weeks

## 2021-04-09 DIAGNOSIS — Z30.9 ENCOUNTER FOR CONTRACEPTIVE MANAGEMENT, UNSPECIFIED TYPE: Primary | ICD-10-CM

## 2021-04-09 RX ORDER — NORETHINDRONE ACETATE AND ETHINYL ESTRADIOL 1MG-20(21)
1 KIT ORAL DAILY
Qty: 28 TABLET | Refills: 1 | Status: SHIPPED | OUTPATIENT
Start: 2021-04-09 | End: 2021-04-29 | Stop reason: SDUPTHER

## 2021-04-09 NOTE — TELEPHONE ENCOUNTER
Pt called in requesting Rx for her Junel FE 1/20, pt scheduled for yearly with kk on 4/29/2021    Thank you

## 2021-04-10 ENCOUNTER — APPOINTMENT (OUTPATIENT)
Dept: LAB | Facility: CLINIC | Age: 36
End: 2021-04-10
Payer: COMMERCIAL

## 2021-04-10 DIAGNOSIS — E06.3 HYPOTHYROIDISM DUE TO HASHIMOTO'S THYROIDITIS: ICD-10-CM

## 2021-04-10 DIAGNOSIS — E03.8 HYPOTHYROIDISM DUE TO HASHIMOTO'S THYROIDITIS: ICD-10-CM

## 2021-04-10 LAB
T4 FREE SERPL-MCNC: 1.21 NG/DL (ref 0.76–1.46)
TSH SERPL DL<=0.05 MIU/L-ACNC: 17.4 UIU/ML (ref 0.36–3.74)

## 2021-04-10 PROCEDURE — 84443 ASSAY THYROID STIM HORMONE: CPT

## 2021-04-10 PROCEDURE — 84439 ASSAY OF FREE THYROXINE: CPT

## 2021-04-10 PROCEDURE — 36415 COLL VENOUS BLD VENIPUNCTURE: CPT

## 2021-04-10 RX ORDER — LEVOTHYROXINE SODIUM 0.1 MG/1
TABLET ORAL
Qty: 30 TABLET | Refills: 0 | Status: SHIPPED | OUTPATIENT
Start: 2021-04-10 | End: 2021-07-14

## 2021-04-29 ENCOUNTER — ANNUAL EXAM (OUTPATIENT)
Dept: OBGYN CLINIC | Facility: MEDICAL CENTER | Age: 36
End: 2021-04-29
Payer: COMMERCIAL

## 2021-04-29 VITALS — WEIGHT: 106 LBS | DIASTOLIC BLOOD PRESSURE: 62 MMHG | BODY MASS INDEX: 21.41 KG/M2 | SYSTOLIC BLOOD PRESSURE: 110 MMHG

## 2021-04-29 DIAGNOSIS — Z30.9 ENCOUNTER FOR CONTRACEPTIVE MANAGEMENT, UNSPECIFIED TYPE: ICD-10-CM

## 2021-04-29 DIAGNOSIS — R10.2 PELVIC PAIN: ICD-10-CM

## 2021-04-29 DIAGNOSIS — Z01.419 ENCOUNTER FOR GYNECOLOGICAL EXAMINATION (GENERAL) (ROUTINE) WITHOUT ABNORMAL FINDINGS: Primary | ICD-10-CM

## 2021-04-29 DIAGNOSIS — Z30.09 GENERAL COUNSELLING AND ADVICE ON CONTRACEPTION: ICD-10-CM

## 2021-04-29 DIAGNOSIS — E03.8 HYPOTHYROIDISM DUE TO HASHIMOTO'S THYROIDITIS: ICD-10-CM

## 2021-04-29 DIAGNOSIS — E06.3 HYPOTHYROIDISM DUE TO HASHIMOTO'S THYROIDITIS: ICD-10-CM

## 2021-04-29 PROCEDURE — S0612 ANNUAL GYNECOLOGICAL EXAMINA: HCPCS | Performed by: NURSE PRACTITIONER

## 2021-04-29 RX ORDER — NORETHINDRONE ACETATE AND ETHINYL ESTRADIOL 1MG-20(21)
KIT ORAL
Qty: 120 TABLET | Refills: 4 | Status: SHIPPED | OUTPATIENT
Start: 2021-04-29 | End: 2021-06-29

## 2021-04-29 NOTE — ASSESSMENT & PLAN NOTE
The patient likes current OCP  She denies ACHES and desires to continue  She desires trial of continuous use and refill was revised to reflect this  She is aware condoms are advised with all sexual contact for prevention of STI  Reviewed reasons to call

## 2021-04-29 NOTE — ASSESSMENT & PLAN NOTE
Normal findings on routine gyn exam  Advised monthly SBE, annual CBE and baseline screening mammo at age 36  Reviewed ASCCP guidelines and recommended pap with cotesting q3 yrs for this low risk patient; this was noted to be up to date  STI testing was offered and the patient declines; she reports low risk  The patient desires to continue current contraceptive method (Lilian Lindsey)  Diet/activity recommendations:  Encouraged daily Ca++ and vitamin D intake as well as daily weight bearing exercise for promotion of bone health  RTO in one year or sooner PRN

## 2021-04-29 NOTE — ASSESSMENT & PLAN NOTE
C/o low pelvic discomfort and distention while off OCP  Normal exam today  Reassurance provided   US ordered to assess adnexa for further reassurance

## 2021-04-29 NOTE — PROGRESS NOTES
Assessment/Plan:    Encounter for gynecological examination (general) (routine) without abnormal findings  Normal findings on routine gyn exam  Advised monthly SBE, annual CBE and baseline screening mammo at age 36  Reviewed ASCCP guidelines and recommended pap with cotesting q3 yrs for this low risk patient; this was noted to be up to date  STI testing was offered and the patient declines; she reports low risk  The patient desires to continue current contraceptive method ([address])  Diet/activity recommendations:  Encouraged daily Ca++ and vitamin D intake as well as daily weight bearing exercise for promotion of bone health  RTO in one year or sooner PRN  General counselling and advice on contraception  The patient likes current OCP  She denies ACHES and desires to continue  She desires trial of continuous use and refill was revised to reflect this  She is aware condoms are advised with all sexual contact for prevention of STI  Reviewed reasons to call  Hypothyroidism  4/10/21 TSH 17 4  F/u is scheduled with PCP to discuss medication adjustment    Pelvic pain  C/o low pelvic discomfort and distention while off OCP  Normal exam today  Reassurance provided  US ordered to assess adnexa for further reassurance        Diagnoses and all orders for this visit:    Encounter for gynecological examination (general) (routine) without abnormal findings    Pelvic pain  -     US pelvis complete w transvaginal; Future    General counselling and advice on contraception    Encounter for contraceptive management, unspecified type  -     norethindrone-ethinyl estradiol (JUNEL FE 1/20) 1-20 MG-MCG per tablet; One tablet by mouth daily, skipping placebos    Hypothyroidism due to Hashimoto's thyroiditis          Subjective:      Patient ID: Iwona Ross is a 28 y o  female  This patient presents for routine annual gyn exam    Medically complex but stable   Thyroid function is managed by PCP and f/u is scheduled  Stopped OCP for a few months to see how she would feel  Periods were heavy and painful again  She had some midcycle low pelvic pain and distention which resolved with menses - she expresses anxiety that this could be related to underlying gyn disease  She restarted DARWIN and withdrawal bleeds are light and reg  Denies ACHES and desires to continue  She does reports still having cramping during withdrawal and would be interested in trial of continuous dosing  She denies acute gyn complaints  She denies pelvic pain, breast concerns, abnormal discharge, bowel/bladder dysfunction, depression/anxiety  Not sexually active  She denies STI concerns  She is working in a [employer] program and loves it   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       The following portions of the patient's history were reviewed and updated as appropriate: allergies, current medications, past family history, past medical history, past social history, past surgical history and problem list     Review of Systems   Constitutional: Negative  Respiratory: Negative  Cardiovascular: Negative  Gastrointestinal: Negative  Genitourinary: Positive for menstrual problem  Negative for decreased urine volume, difficulty urinating, dysuria, enuresis, flank pain, frequency, genital sores, hematuria, pelvic pain, urgency, vaginal bleeding, vaginal discharge and vaginal pain  Musculoskeletal: Negative  Skin: Negative  Neurological: Negative  Psychiatric/Behavioral: Negative  Objective:      /62   Wt 48 1 kg (106 lb)   LMP 04/25/2021   BMI 21 41 kg/m²          Physical Exam  Constitutional:       Appearance: She is well-developed  HENT:      Head: Normocephalic and atraumatic  Eyes:      Pupils: Pupils are equal, round, and reactive to light  Neck:      Musculoskeletal: Normal range of motion and neck supple  Thyroid: No thyromegaly     Cardiovascular:      Rate and Rhythm: Normal rate and regular rhythm  Heart sounds: Normal heart sounds  Pulmonary:      Effort: Pulmonary effort is normal  No respiratory distress  Breath sounds: Normal breath sounds  No wheezing or rales  Chest:      Chest wall: No mass, deformity or tenderness  Breasts: Breasts are symmetrical          Right: No inverted nipple, mass, nipple discharge, skin change or tenderness  Left: No inverted nipple, mass, nipple discharge, skin change or tenderness  Abdominal:      General: There is no distension  Palpations: Abdomen is soft  There is no hepatomegaly, splenomegaly or mass  Tenderness: There is no abdominal tenderness  There is no guarding or rebound  Genitourinary:     Labia:         Right: No rash, tenderness, lesion or injury  Left: No rash, tenderness, lesion or injury  Vagina: Normal  No foreign body  No vaginal discharge, erythema, tenderness or bleeding  Cervix: No cervical motion tenderness, discharge or friability  Uterus: Normal        Adnexa:         Right: No mass, tenderness or fullness  Left: No mass, tenderness or fullness  Rectum: Normal    Musculoskeletal: Normal range of motion  Lymphadenopathy:      Cervical: No cervical adenopathy  Skin:     General: Skin is warm and dry  Findings: No rash  Nails: There is no clubbing  Neurological:      Mental Status: She is alert and oriented to person, place, and time  Cranial Nerves: No cranial nerve deficit  Psychiatric:         Speech: Speech normal          Behavior: Behavior normal          Thought Content:  Thought content normal          Judgment: Judgment normal

## 2021-05-14 ENCOUNTER — TELEPHONE (OUTPATIENT)
Dept: OBGYN CLINIC | Facility: CLINIC | Age: 36
End: 2021-05-14

## 2021-05-14 DIAGNOSIS — E06.3 HYPOTHYROIDISM DUE TO HASHIMOTO'S THYROIDITIS: ICD-10-CM

## 2021-05-14 DIAGNOSIS — E03.8 HYPOTHYROIDISM DUE TO HASHIMOTO'S THYROIDITIS: ICD-10-CM

## 2021-05-14 NOTE — TELEPHONE ENCOUNTER
Spoke to pt and she said she had BTB on OC's last 2 nights (spotting minimal)- used only 1 tampon  LMP 4/25 she said when she was here on 4/29 for her annual she was bleeding  She said she had hx of cysts, not sure if one burst because she was a little uncomfortable night before  Per chart notes says she skips placebos  Pt says she does not always do this, sometimes she has a period  She said no UPT done- not needed not currently sexually active  She is heading into 2nd wk of pills  Only new med is a vitamin for her hair and she says she has been exercising more  Spotting has stopped  Nothing today

## 2021-05-14 NOTE — TELEPHONE ENCOUNTER
Please advise continuing OCP  Encourage taking at the same time daily  BTB may be related to thyroid dysfunction - TSH was significantly elevated   Follow up PRN if BTB persists after thyroid function has been corrected

## 2021-05-14 NOTE — TELEPHONE ENCOUNTER
Spoke to pt and relayed msg from [facility] and she will continue to monitor and is working on tx for her thyroid  She will call if no imp

## 2021-05-17 RX ORDER — LEVOTHYROXINE SODIUM 0.1 MG/1
TABLET ORAL
Qty: 30 TABLET | Refills: 1 | OUTPATIENT
Start: 2021-05-17

## 2021-06-07 ENCOUNTER — HOSPITAL ENCOUNTER (OUTPATIENT)
Dept: RADIOLOGY | Facility: MEDICAL CENTER | Age: 36
Discharge: HOME | End: 2021-06-07
Payer: COMMERCIAL

## 2021-06-07 DIAGNOSIS — R10.2 PELVIC PAIN: ICD-10-CM

## 2021-06-07 PROCEDURE — 76856 US EXAM PELVIC COMPLETE: CPT

## 2021-06-07 PROCEDURE — 76830 TRANSVAGINAL US NON-OB: CPT

## 2021-06-11 ENCOUNTER — TELEPHONE (OUTPATIENT)
Dept: OBGYN CLINIC | Facility: CLINIC | Age: 36
End: 2021-06-11

## 2021-06-11 NOTE — TELEPHONE ENCOUNTER
Spoke to pt and let her know not been read yet and I will call over to radiology      Called over and they will try to get it read

## 2021-06-16 ENCOUNTER — TELEPHONE (OUTPATIENT)
Dept: OBGYN CLINIC | Facility: CLINIC | Age: 36
End: 2021-06-16

## 2021-06-16 NOTE — TELEPHONE ENCOUNTER
She does not need to make an appt if she is just planning to d/c the OCP and observe  I would advise that she follows up with her PCP/Endo to have thyroid function checked again, as last TSH was 17 and this may contribute to abn bleeding

## 2021-06-16 NOTE — TELEPHONE ENCOUNTER
Called pt to let her know about her ultrasound results, pt states she is still having breakthrough bleeding and cramping   Stated she was going to stop taking the birth control pill for now, advised her to make an appointment      ----- Message from Prince Solomon sent at 6/14/2021  4:07 PM EDT -----  Normal pelvic US   Please notify patient

## 2021-06-29 ENCOUNTER — OFFICE VISIT (OUTPATIENT)
Dept: FAMILY MEDICINE CLINIC | Facility: MEDICAL CENTER | Age: 36
End: 2021-06-29
Payer: COMMERCIAL

## 2021-06-29 VITALS
RESPIRATION RATE: 16 BRPM | TEMPERATURE: 100 F | HEART RATE: 90 BPM | BODY MASS INDEX: 21.57 KG/M2 | WEIGHT: 107 LBS | DIASTOLIC BLOOD PRESSURE: 70 MMHG | HEIGHT: 59 IN | SYSTOLIC BLOOD PRESSURE: 116 MMHG

## 2021-06-29 DIAGNOSIS — Q87.19 NOONAN'S SYNDROME: ICD-10-CM

## 2021-06-29 DIAGNOSIS — Q24.9 CONGENITAL HEART DISEASE: ICD-10-CM

## 2021-06-29 DIAGNOSIS — R73.9 HYPERGLYCEMIA: ICD-10-CM

## 2021-06-29 DIAGNOSIS — H02.402 DROOPY EYELID, LEFT: ICD-10-CM

## 2021-06-29 DIAGNOSIS — E78.2 MIXED HYPERLIPIDEMIA: ICD-10-CM

## 2021-06-29 DIAGNOSIS — E03.8 HYPOTHYROIDISM DUE TO HASHIMOTO'S THYROIDITIS: Primary | ICD-10-CM

## 2021-06-29 DIAGNOSIS — F32.9 MAJOR DEPRESSIVE DISORDER, REMISSION STATUS UNSPECIFIED, UNSPECIFIED WHETHER RECURRENT: ICD-10-CM

## 2021-06-29 DIAGNOSIS — L60.3 BRITTLE NAILS: ICD-10-CM

## 2021-06-29 DIAGNOSIS — F41.9 ANXIETY: ICD-10-CM

## 2021-06-29 DIAGNOSIS — E06.3 HYPOTHYROIDISM DUE TO HASHIMOTO'S THYROIDITIS: Primary | ICD-10-CM

## 2021-06-29 PROCEDURE — 1036F TOBACCO NON-USER: CPT | Performed by: FAMILY MEDICINE

## 2021-06-29 PROCEDURE — 3008F BODY MASS INDEX DOCD: CPT | Performed by: FAMILY MEDICINE

## 2021-06-29 PROCEDURE — 3725F SCREEN DEPRESSION PERFORMED: CPT | Performed by: FAMILY MEDICINE

## 2021-06-29 PROCEDURE — 99214 OFFICE O/P EST MOD 30 MIN: CPT | Performed by: FAMILY MEDICINE

## 2021-06-29 NOTE — PROGRESS NOTES
Assessment/Plan:       Diagnoses and all orders for this visit:    Hypothyroidism due to Hashimoto's thyroiditis  -     TSH, 3rd generation; Future  -     T4, free; Future  Check TSH and free T4  Continue levothyroxine 100 mcg for the time being  Future refill will be with Synthroid brand name either at 100 mcg daily or with adjusted dose based on lab results  Major depressive disorder, remission status unspecified, unspecified whether recurrent  Depression is fairly stable without the need for medication  Monitor  Anxiety  Anxiety is stable without the need for medication  Monitor  Mixed hyperlipidemia  -     Lipid panel; Future  -     LDL cholesterol, direct; Future  Check lipid levels  Continue with diet and exercise  Hyperglycemia  -     Comprehensive metabolic panel; Future  -     Hemoglobin A1C; Future  Check labs to monitor glucose levels and A1c  Continue with diet and exercise  Brittle nails  -     Ambulatory referral to Dermatology; Future  Cause unclear  Referral to Dermatology for evaluation  Droopy eyelid, left  I offered patient referral to Dr Matthew Munson for another opinion about her eyelids  She declines at this time  If she changes her mind she will let me know  [place]'s syndrome  Congenital heart disease  Stable  Followed by Cardiology  Follow-up in three months or sooner if needed  Subjective:      Patient ID: Emory Kim is a 28 y o  female  Patient presents for follow-up  She has hypothyroidism  Currently on levothyroxine 100 mcg daily  She would like labs to check her thyroid function  She would also like to switch to Synthroid brand name thyroid medication with her next refill  She has depression  Not on any medication at this time  She is coping well  Was on Lexapro but did not like the side effects  She has anxiety  Not on any medication at this time  She is coping well    Was on Lexapro but did not like the side effects  She has hyperlipidemia  Trying to eat healthy and stay active  Not on any medication for her lipids  Would like labs to check her lipid levels  She has hyperglycemia  Trying to eat healthy and stay active  Not on any medication for her glucose  Would like labs to check her glucose levels  She does have brittle nails on the right hand  She is wondering if she has some sort of infection  Continues to have drooping eyelids  Was seen by Plastic surgery who referred patient to another physician for evaluation  Patient not too keen on the 2nd physician  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  History of cardiac surgery  She is followed by Cardiology  The following portions of the patient's history were reviewed and updated as appropriate: She  has a past medical history of Cardiac disease, Disease of thyroid gland, Filemon syndrome, and Pulmonary stenosis  She   Patient Active Problem List    Diagnosis Date Noted    Pelvic pain 04/29/2021    Hyperglycemia 09/04/2020    Hyperlipidemia 09/04/2020    Depression 09/03/2020    Droopy eyelid, left 09/03/2020    Cervical pain (neck) 09/03/2020    Chronic bilateral thoracic back pain 09/03/2020    Lumbar back pain with radiculopathy affecting left lower extremity 09/03/2020    Lumbar back pain with radiculopathy affecting right lower extremity 09/03/2020    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    Nonrheumatic pulmonary valve stenosis 01/30/2020    General counselling and advice on contraception 01/22/2020    Hypothyroidism 01/22/2020    Encounter for gynecological examination (general) (routine) without abnormal findings 01/22/2020    Congenital heart disease 09/06/2018    Anxiety 04/12/2016     She  has a past surgical history that includes Cardiac surgery  Her family history includes Diabetes in her father; Thyroid disease in her mother  She  reports that she has never smoked   She has never used smokeless tobacco  She reports current alcohol use  She reports that she does not use drugs  Current Outpatient Medications   Medication Sig Dispense Refill    levothyroxine 100 mcg tablet TAKE 1 TABLET BY MOUTH EVERY DAY 30 tablet 0     No current facility-administered medications for this visit  Current Outpatient Medications on File Prior to Visit   Medication Sig    levothyroxine 100 mcg tablet TAKE 1 TABLET BY MOUTH EVERY DAY    [DISCONTINUED] escitalopram (LEXAPRO) 5 mg tablet TAKE 1 TABLET BY MOUTH EVERY DAY (Patient not taking: Reported on 4/29/2021)    [DISCONTINUED] Ozella Acron FE 1/20 1-20 MG-MCG per tablet Please specify directions, refills and quantity (Patient not taking: Reported on 6/29/2021)    [DISCONTINUED] naproxen (NAPROSYN) 500 mg tablet Take 1 tablet (500 mg total) by mouth 2 (two) times a day with meals As needed for abdominal pain (Patient not taking: Reported on 4/29/2021)    [DISCONTINUED] norethindrone-ethinyl estradiol (JUNEL FE 1/20) 1-20 MG-MCG per tablet One tablet by mouth daily, skipping placebos (Patient not taking: Reported on 6/29/2021)     No current facility-administered medications on file prior to visit  She is allergic to bee venom and pollen extract       Review of Systems   Constitutional: Negative for fever  Respiratory: Negative for shortness of breath  Cardiovascular: Negative for chest pain  Objective:      /70 (BP Location: Left arm, Patient Position: Sitting, Cuff Size: Adult)   Pulse 90   Temp 100 °F (37 8 °C)   Resp 16   Ht 4' 11" (1 499 m)   Wt 48 5 kg (107 lb)   BMI 21 61 kg/m²          Physical Exam  Constitutional:       General: She is not in acute distress  Appearance: She is not ill-appearing  Eyes:      Comments: Bilateral drooping eyelids left worse than right  Cardiovascular:      Rate and Rhythm: Normal rate and regular rhythm  Heart sounds: Murmur heard       Pulmonary:      Effort: Pulmonary effort is normal       Breath sounds: Normal breath sounds     Musculoskeletal:        Hands:

## 2021-07-10 ENCOUNTER — APPOINTMENT (OUTPATIENT)
Dept: LAB | Facility: CLINIC | Age: 36
End: 2021-07-10
Payer: COMMERCIAL

## 2021-07-10 DIAGNOSIS — E06.3 HYPOTHYROIDISM DUE TO HASHIMOTO'S THYROIDITIS: ICD-10-CM

## 2021-07-10 DIAGNOSIS — R73.9 HYPERGLYCEMIA: ICD-10-CM

## 2021-07-10 DIAGNOSIS — E03.8 HYPOTHYROIDISM DUE TO HASHIMOTO'S THYROIDITIS: ICD-10-CM

## 2021-07-10 DIAGNOSIS — E78.2 MIXED HYPERLIPIDEMIA: ICD-10-CM

## 2021-07-10 LAB
ALBUMIN SERPL BCP-MCNC: 4.2 G/DL (ref 3.5–5)
ALP SERPL-CCNC: 49 U/L (ref 46–116)
ALT SERPL W P-5'-P-CCNC: 20 U/L (ref 12–78)
ANION GAP SERPL CALCULATED.3IONS-SCNC: 4 MMOL/L (ref 4–13)
AST SERPL W P-5'-P-CCNC: 11 U/L (ref 5–45)
BILIRUB SERPL-MCNC: 0.45 MG/DL (ref 0.2–1)
BUN SERPL-MCNC: 13 MG/DL (ref 5–25)
CALCIUM SERPL-MCNC: 9.3 MG/DL (ref 8.3–10.1)
CHLORIDE SERPL-SCNC: 107 MMOL/L (ref 100–108)
CHOLEST SERPL-MCNC: 199 MG/DL (ref 50–200)
CO2 SERPL-SCNC: 26 MMOL/L (ref 21–32)
CREAT SERPL-MCNC: 0.65 MG/DL (ref 0.6–1.3)
EST. AVERAGE GLUCOSE BLD GHB EST-MCNC: 111 MG/DL
GFR SERPL CREATININE-BSD FRML MDRD: 115 ML/MIN/1.73SQ M
GLUCOSE P FAST SERPL-MCNC: 83 MG/DL (ref 65–99)
HBA1C MFR BLD: 5.5 %
HDLC SERPL-MCNC: 43 MG/DL
LDLC SERPL CALC-MCNC: 136 MG/DL (ref 0–100)
LDLC SERPL DIRECT ASSAY-MCNC: 139 MG/DL (ref 0–100)
NONHDLC SERPL-MCNC: 156 MG/DL
POTASSIUM SERPL-SCNC: 4.1 MMOL/L (ref 3.5–5.3)
PROT SERPL-MCNC: 8.1 G/DL (ref 6.4–8.2)
SODIUM SERPL-SCNC: 137 MMOL/L (ref 136–145)
T4 FREE SERPL-MCNC: 1 NG/DL (ref 0.76–1.46)
TRIGL SERPL-MCNC: 98 MG/DL
TSH SERPL DL<=0.05 MIU/L-ACNC: 19 UIU/ML (ref 0.36–3.74)

## 2021-07-10 PROCEDURE — 83036 HEMOGLOBIN GLYCOSYLATED A1C: CPT

## 2021-07-10 PROCEDURE — 80053 COMPREHEN METABOLIC PANEL: CPT

## 2021-07-10 PROCEDURE — 80061 LIPID PANEL: CPT

## 2021-07-10 PROCEDURE — 84443 ASSAY THYROID STIM HORMONE: CPT

## 2021-07-10 PROCEDURE — 83721 ASSAY OF BLOOD LIPOPROTEIN: CPT

## 2021-07-10 PROCEDURE — 84439 ASSAY OF FREE THYROXINE: CPT

## 2021-07-10 PROCEDURE — 36415 COLL VENOUS BLD VENIPUNCTURE: CPT

## 2021-07-14 ENCOUNTER — TELEPHONE (OUTPATIENT)
Dept: FAMILY MEDICINE CLINIC | Facility: MEDICAL CENTER | Age: 36
End: 2021-07-14

## 2021-07-14 DIAGNOSIS — E03.8 HYPOTHYROIDISM DUE TO HASHIMOTO'S THYROIDITIS: Primary | ICD-10-CM

## 2021-07-14 DIAGNOSIS — E06.3 HYPOTHYROIDISM DUE TO HASHIMOTO'S THYROIDITIS: Primary | ICD-10-CM

## 2021-07-14 RX ORDER — LEVOTHYROXINE SODIUM 125 UG/1
125 TABLET ORAL DAILY
Qty: 30 TABLET | Refills: 1 | Status: SHIPPED | OUTPATIENT
Start: 2021-07-14 | End: 2021-08-10

## 2021-07-14 NOTE — TELEPHONE ENCOUNTER
Pt called to ask if she could speak with Dr Tamara Banda about her thyroid test results  She has seen them in [address]      Routed to Dr Tamara Banda

## 2021-07-14 NOTE — TELEPHONE ENCOUNTER
I reviewed her labs  Thyroid labs still remain abnormal   I would recommend increasing her levothyroxine dose  I will however prescribe Synthroid instead as this may provide better thyroid control  Otherwise her labs were unremarkable  If she is agreeable to the increase in thyroid dose I will go ahead and fax in the new medication to her pharmacy of choice and repeat labs in about six weeks

## 2021-07-14 NOTE — TELEPHONE ENCOUNTER
Synthroid 125 mcg daily faxed  If medication is too expensive have patient call the office and I will fax in the generic  Or she can just send me a [employer] message  Lab orders placed as well to be performed in six weeks

## 2021-08-28 ENCOUNTER — APPOINTMENT (OUTPATIENT)
Dept: LAB | Facility: CLINIC | Age: 36
End: 2021-08-28
Payer: COMMERCIAL

## 2021-08-28 DIAGNOSIS — E03.8 HYPOTHYROIDISM DUE TO HASHIMOTO'S THYROIDITIS: ICD-10-CM

## 2021-08-28 DIAGNOSIS — E06.3 HYPOTHYROIDISM DUE TO HASHIMOTO'S THYROIDITIS: ICD-10-CM

## 2021-08-28 LAB
T4 FREE SERPL-MCNC: 0.98 NG/DL (ref 0.76–1.46)
TSH SERPL DL<=0.05 MIU/L-ACNC: 12 UIU/ML (ref 0.36–3.74)

## 2021-08-28 PROCEDURE — 84439 ASSAY OF FREE THYROXINE: CPT

## 2021-08-28 PROCEDURE — 84443 ASSAY THYROID STIM HORMONE: CPT

## 2021-08-28 PROCEDURE — 36415 COLL VENOUS BLD VENIPUNCTURE: CPT

## 2021-09-24 DIAGNOSIS — E06.3 HYPOTHYROIDISM DUE TO HASHIMOTO'S THYROIDITIS: ICD-10-CM

## 2021-09-24 DIAGNOSIS — E03.8 HYPOTHYROIDISM DUE TO HASHIMOTO'S THYROIDITIS: ICD-10-CM

## 2021-09-24 RX ORDER — LEVOTHYROXINE SODIUM 150 UG/1
TABLET ORAL
Qty: 30 TABLET | Refills: 1 | Status: SHIPPED | OUTPATIENT
Start: 2021-09-24 | End: 2021-10-12

## 2021-10-09 ENCOUNTER — APPOINTMENT (OUTPATIENT)
Dept: LAB | Facility: CLINIC | Age: 36
End: 2021-10-09
Payer: COMMERCIAL

## 2021-10-09 DIAGNOSIS — E03.8 HYPOTHYROIDISM DUE TO HASHIMOTO'S THYROIDITIS: ICD-10-CM

## 2021-10-09 DIAGNOSIS — E06.3 HYPOTHYROIDISM DUE TO HASHIMOTO'S THYROIDITIS: ICD-10-CM

## 2021-10-09 LAB
T4 FREE SERPL-MCNC: 1.67 NG/DL (ref 0.76–1.46)
TSH SERPL DL<=0.05 MIU/L-ACNC: 0.18 UIU/ML (ref 0.36–3.74)

## 2021-10-09 PROCEDURE — 36415 COLL VENOUS BLD VENIPUNCTURE: CPT

## 2021-10-09 PROCEDURE — 84439 ASSAY OF FREE THYROXINE: CPT

## 2021-10-09 PROCEDURE — 84443 ASSAY THYROID STIM HORMONE: CPT

## 2021-10-11 ENCOUNTER — TELEPHONE (OUTPATIENT)
Dept: FAMILY MEDICINE CLINIC | Facility: MEDICAL CENTER | Age: 36
End: 2021-10-11

## 2021-10-12 ENCOUNTER — OFFICE VISIT (OUTPATIENT)
Dept: FAMILY MEDICINE CLINIC | Facility: MEDICAL CENTER | Age: 36
End: 2021-10-12
Payer: COMMERCIAL

## 2021-10-12 VITALS
HEART RATE: 58 BPM | TEMPERATURE: 99.4 F | DIASTOLIC BLOOD PRESSURE: 78 MMHG | WEIGHT: 106 LBS | HEIGHT: 59 IN | SYSTOLIC BLOOD PRESSURE: 118 MMHG | BODY MASS INDEX: 21.37 KG/M2

## 2021-10-12 DIAGNOSIS — Q87.19 NOONAN'S SYNDROME: ICD-10-CM

## 2021-10-12 DIAGNOSIS — E03.8 HYPOTHYROIDISM DUE TO HASHIMOTO'S THYROIDITIS: Primary | ICD-10-CM

## 2021-10-12 DIAGNOSIS — Z00.00 PHYSICAL EXAM, ANNUAL: ICD-10-CM

## 2021-10-12 DIAGNOSIS — E06.3 HYPOTHYROIDISM DUE TO HASHIMOTO'S THYROIDITIS: Primary | ICD-10-CM

## 2021-10-12 DIAGNOSIS — Q24.9 CONGENITAL HEART DISEASE: ICD-10-CM

## 2021-10-12 DIAGNOSIS — Z23 NEEDS FLU SHOT: ICD-10-CM

## 2021-10-12 PROBLEM — M54.16 LUMBAR BACK PAIN WITH RADICULOPATHY AFFECTING LEFT LOWER EXTREMITY: Status: RESOLVED | Noted: 2020-09-03 | Resolved: 2021-10-12

## 2021-10-12 PROBLEM — G89.29 CHRONIC BILATERAL THORACIC BACK PAIN: Status: RESOLVED | Noted: 2020-09-03 | Resolved: 2021-10-12

## 2021-10-12 PROBLEM — M54.2 CERVICAL PAIN (NECK): Status: RESOLVED | Noted: 2020-09-03 | Resolved: 2021-10-12

## 2021-10-12 PROBLEM — F32.A DEPRESSION: Status: RESOLVED | Noted: 2020-09-03 | Resolved: 2021-10-12

## 2021-10-12 PROBLEM — E78.5 HYPERLIPIDEMIA: Status: RESOLVED | Noted: 2020-09-04 | Resolved: 2021-10-12

## 2021-10-12 PROBLEM — Z30.09 GENERAL COUNSELLING AND ADVICE ON CONTRACEPTION: Status: RESOLVED | Noted: 2020-01-22 | Resolved: 2021-10-12

## 2021-10-12 PROBLEM — M54.6 CHRONIC BILATERAL THORACIC BACK PAIN: Status: RESOLVED | Noted: 2020-09-03 | Resolved: 2021-10-12

## 2021-10-12 PROBLEM — R73.9 HYPERGLYCEMIA: Status: RESOLVED | Noted: 2020-09-04 | Resolved: 2021-10-12

## 2021-10-12 PROBLEM — M54.16 LUMBAR BACK PAIN WITH RADICULOPATHY AFFECTING RIGHT LOWER EXTREMITY: Status: RESOLVED | Noted: 2020-09-03 | Resolved: 2021-10-12

## 2021-10-12 PROBLEM — R10.2 PELVIC PAIN: Status: RESOLVED | Noted: 2021-04-29 | Resolved: 2021-10-12

## 2021-10-12 PROCEDURE — 90686 IIV4 VACC NO PRSV 0.5 ML IM: CPT | Performed by: FAMILY MEDICINE

## 2021-10-12 PROCEDURE — 1036F TOBACCO NON-USER: CPT | Performed by: FAMILY MEDICINE

## 2021-10-12 PROCEDURE — 99395 PREV VISIT EST AGE 18-39: CPT | Performed by: FAMILY MEDICINE

## 2021-10-12 PROCEDURE — 99213 OFFICE O/P EST LOW 20 MIN: CPT | Performed by: FAMILY MEDICINE

## 2021-10-12 PROCEDURE — 90471 IMMUNIZATION ADMIN: CPT | Performed by: FAMILY MEDICINE

## 2021-10-12 PROCEDURE — 3725F SCREEN DEPRESSION PERFORMED: CPT | Performed by: FAMILY MEDICINE

## 2021-10-12 PROCEDURE — 3008F BODY MASS INDEX DOCD: CPT | Performed by: FAMILY MEDICINE

## 2021-10-12 RX ORDER — LEVOTHYROXINE SODIUM 137 MCG
137 TABLET ORAL DAILY
Qty: 30 TABLET | Refills: 1 | Status: SHIPPED | OUTPATIENT
Start: 2021-10-12 | End: 2021-12-15

## 2021-12-15 DIAGNOSIS — E03.8 HYPOTHYROIDISM DUE TO HASHIMOTO'S THYROIDITIS: ICD-10-CM

## 2021-12-15 DIAGNOSIS — E06.3 HYPOTHYROIDISM DUE TO HASHIMOTO'S THYROIDITIS: ICD-10-CM

## 2021-12-15 RX ORDER — LEVOTHYROXINE SODIUM 137 MCG
137 TABLET ORAL DAILY
Qty: 30 TABLET | Refills: 1 | Status: SHIPPED | OUTPATIENT
Start: 2021-12-15 | End: 2022-01-04

## 2022-01-10 ENCOUNTER — TELEPHONE (OUTPATIENT)
Dept: OBGYN CLINIC | Facility: CLINIC | Age: 37
End: 2022-01-10

## 2022-01-10 NOTE — TELEPHONE ENCOUNTER
Pt is on her period now whish started 1/4/22- it is very heavy - it was almost every hour for the first 4-5 days with an ultra tampon; it is also very painful  She is not on any birth ctl

## 2022-01-10 NOTE — TELEPHONE ENCOUNTER
I returned pt call  Pt stated cycle began on 04/04 saturating an ultra tampon with pain for 4-5 days  However today Pt confirmed she is bleeding like her normal heavy day, but its at the end of her cycle which is not normal for her  Pt is asking if maybe a cyst rupture? Pt informed it could be possible, no way of knowing  pt denied diet changes but no stress  Pt confirmed first abnormal cycle  Pt informed may be due to stress as it can affect hormonal imbalance  Pt informed if was in fact a cyst that ruptured nothing to do at this time as that would mean the fluid has released and the body will heal  Pt informed she may take 600 mg of ibuprofen every 6 hours for the next 24-48 hours to help with the pain and bleeding  Pt informed to monitor at this time and should regulate for next month  Pt further informed if abnormal cycle next month to call the office  Pt informed I will update the provider and see if any other recommendations

## 2022-01-18 DIAGNOSIS — E03.8 HYPOTHYROIDISM DUE TO HASHIMOTO'S THYROIDITIS: ICD-10-CM

## 2022-01-18 DIAGNOSIS — E06.3 HYPOTHYROIDISM DUE TO HASHIMOTO'S THYROIDITIS: ICD-10-CM

## 2022-01-18 RX ORDER — LEVOTHYROXINE SODIUM 137 MCG
TABLET ORAL
Qty: 90 TABLET | Refills: 1 | Status: SHIPPED | OUTPATIENT
Start: 2022-01-18 | End: 2022-06-06 | Stop reason: SDUPTHER

## 2022-01-25 ENCOUNTER — CONSULT (OUTPATIENT)
Dept: DERMATOLOGY | Facility: CLINIC | Age: 37
End: 2022-01-25
Payer: COMMERCIAL

## 2022-01-25 VITALS — HEIGHT: 59 IN | TEMPERATURE: 96.8 F | WEIGHT: 106 LBS | BODY MASS INDEX: 21.37 KG/M2

## 2022-01-25 DIAGNOSIS — L85.3 XEROSIS OF SKIN: ICD-10-CM

## 2022-01-25 DIAGNOSIS — B35.1 ONYCHOMYCOSIS: ICD-10-CM

## 2022-01-25 DIAGNOSIS — D18.01 CHERRY ANGIOMA: ICD-10-CM

## 2022-01-25 DIAGNOSIS — L81.4 LENTIGO: ICD-10-CM

## 2022-01-25 DIAGNOSIS — D22.9 MULTIPLE MELANOCYTIC NEVI: Primary | ICD-10-CM

## 2022-01-25 DIAGNOSIS — L60.3 BRITTLE NAILS: ICD-10-CM

## 2022-01-25 DIAGNOSIS — D22.9 FIBROUS PAPULE OF SKIN: ICD-10-CM

## 2022-01-25 PROCEDURE — 87070 CULTURE OTHR SPECIMN AEROBIC: CPT | Performed by: DERMATOLOGY

## 2022-01-25 PROCEDURE — 87102 FUNGUS ISOLATION CULTURE: CPT | Performed by: DERMATOLOGY

## 2022-01-25 PROCEDURE — 88312 SPECIAL STAINS GROUP 1: CPT | Performed by: STUDENT IN AN ORGANIZED HEALTH CARE EDUCATION/TRAINING PROGRAM

## 2022-01-25 PROCEDURE — 88305 TISSUE EXAM BY PATHOLOGIST: CPT | Performed by: STUDENT IN AN ORGANIZED HEALTH CARE EDUCATION/TRAINING PROGRAM

## 2022-01-25 PROCEDURE — 88313 SPECIAL STAINS GROUP 2: CPT | Performed by: STUDENT IN AN ORGANIZED HEALTH CARE EDUCATION/TRAINING PROGRAM

## 2022-01-25 PROCEDURE — 99204 OFFICE O/P NEW MOD 45 MIN: CPT | Performed by: DERMATOLOGY

## 2022-01-25 PROCEDURE — 87205 SMEAR GRAM STAIN: CPT | Performed by: DERMATOLOGY

## 2022-01-25 RX ORDER — GENTAMICIN SULFATE 3 MG/ML
1 SOLUTION/ DROPS OPHTHALMIC DAILY
Qty: 15 ML | Refills: 5 | Status: SHIPPED | OUTPATIENT
Start: 2022-01-25 | End: 2022-06-03 | Stop reason: ALTCHOICE

## 2022-01-25 NOTE — PATIENT INSTRUCTIONS
MELANOCYTIC NEVI ("Moles")  Assessment and Plan:  Based on a thorough discussion of this condition and the management approach to it (including a comprehensive discussion of the known risks, side effects and potential benefits of treatment), the patient (family) agrees to implement the following specific plan:   When outside we recommend using a wide brim hat, sunglasses, long sleeve and pants, sunscreen with SPF 68+ with reapplication every 2 hours, or SPF specific clothing    Benign, reassured   Annual skin check     Melanocytic Nevi  Melanocytic nevi ("moles") are tan or brown, raised or flat areas of the skin which have an increased number of melanocytes  Melanocytes are the cells in our body which make pigment and account for skin color  Some moles are present at birth (I e , "congenital nevi"), while others come up later in life (i e , "acquired nevi")  The sun can stimulate the body to make more moles  Sunburns are not the only thing that triggers more moles  Chronic sun exposure can do it too  Clinically distinguishing a healthy mole from melanoma may be difficult, even for experienced dermatologists  The "ABCDE's" of moles have been suggested as a means of helping to alert a person to a suspicious mole and the possible increased risk of melanoma  The suggestions for raising alert are as follows:    Asymmetry: Healthy moles tend to be symmetric, while melanomas are often asymmetric  Asymmetry means if you draw a line through the mole, the two halves do not match in color, size, shape, or surface texture  Asymmetry can be a result of rapid enlargement of a mole, the development of a raised area on a previously flat lesion, scaling, ulceration, bleeding or scabbing within the mole  Any mole that starts to demonstrate "asymmetry" should be examined promptly by a board certified dermatologist      Border: Healthy moles tend to have discrete, even borders    The border of a melanoma often blends into the normal skin and does not sharply delineate the mole from normal skin  Any mole that starts to demonstrate "uneven borders" should be examined promptly by a board certified dermatologist      Color: Healthy moles tend to be one color throughout  Melanomas tend to be made up of different colors ranging from dark black, blue, white, or red  Any mole that demonstrates a color change should be examined promptly by a board certified dermatologist      Diameter: Healthy moles tend to be smaller than 0 6 cm in size; an exception are "congenital nevi" that can be larger  Melanomas tend to grow and can often be greater than 0 6 cm (1/4 of an inch, or the size of a pencil eraser)  This is only a guideline, and many normal moles may be larger than 0 6 cm without being unhealthy  Any mole that starts to change in size (small to bigger or bigger to smaller) should be examined promptly by a board certified dermatologist      Evolving: Healthy moles tend to "stay the same "  Melanomas may often show signs of change or evolution such as a change in size, shape, color, or elevation  Any mole that starts to itch, bleed, crust, burn, hurt, or ulcerate or demonstrate a change or evolution should be examined promptly by a board certified dermatologist       Bushra Jason and Plan:  Based on a thorough discussion of this condition and the management approach to it (including a comprehensive discussion of the known risks, side effects and potential benefits of treatment), the patient (family) agrees to implement the following specific plan:   When outside we recommend using a wide brim hat, sunglasses, long sleeve and pants, sunscreen with SPF 00+ with reapplication every 2 hours, or SPF specific clothing     What is a lentigo? A lentigo is a pigmented flat or slightly raised lesion with a clearly defined edge  Unlike an ephelis (freckle), it does not fade in the winter months   There are several kinds of lentigo  The name lentigo originally referred to its appearance resembling a small lentil  The plural of lentigo is lentigines, although lentigos is also in common use  Who gets lentigines? Lentigines can affect males and females of all ages and races  Solar lentigines are especially prevalent in fair skinned adults  Lentigines associated with syndromes are present at birth or arise during childhood  What causes lentigines? Common forms of lentigo are due to exposure to ultraviolet radiation:   Sun damage including sunburn    Indoor tanning    Phototherapy, especially photochemotherapy (PUVA)    Ionizing radiation, eg radiation therapy, can also cause lentigines  Several familial syndromes associated with widespread lentigines originate from mutations in Scotty-MAP kinase, mTOR signaling and PTEN pathways  What is the treatment for lentigines? Most lentigines are left alone  Attempts to lighten them may not be successful  The following approaches are used:   SPF 50+ broad-spectrum sunscreen    Hydroquinone bleaching cream    Alpha hydroxy acids    Vitamin C    Retinoids    Azelaic acid    Chemical peels  Individual lesions can be permanently removed using:   Cryotherapy    Intense pulsed light    Pigment lasers    How can lentigines be prevented? Lentigines associated with exposure ultraviolet radiation can be prevented by very careful sun protection  Clothing is more successful at preventing new lentigines than are sunscreens  What is the outlook for lentigines? Lentigines usually persist  They may increase in number with age and sun exposure  Some in sun-protected sites may fade and disappear      TANG ANGIOMAS  Assessment and Plan:  Based on a thorough discussion of this condition and the management approach to it (including a comprehensive discussion of the known risks, side effects and potential benefits of treatment), the patient (family) agrees to implement the following specific plan:   Monitor for changes   Benign, reassured    Assessment and Plan:    Cherry angioma, also known as [employer] spots, are benign vascular skin lesions  A "cherry angioma" is a firm red, blue or purple papule, 0 1-1 cm in diameter  When thrombosed, they can appear black in colour until evaluated with a dermatoscope when the red or purple colour is more easily seen  Cherry angioma may develop on any part of the body but most often appear on the scalp, face, lips and trunk  An angioma is due to proliferating endothelial cells; these are the cells that line the inside of a blood vessel  Angiomas can arise in early life or later in life; the most common type of angioma is a cherry angioma  Cherry angiomas are very common in males and females of any age or race  They are more noticeable in white skin than in skin of colour  They markedly increase in number from about the age of 36  There may be a family history of similar lesions  Eruptive cherry angiomas have been rarely reported to be associated with internal malignancy  The cause of angiomas is unknown  Genetic analysis of cherry angiomas has shown that they frequently carry specific somatic missense mutations in the GNAQ and GNA11 (Q209H) genes, which are involved in other vascular and melanocytic proliferations  XEROSIS ("DRY SKIN")  Assessment and Plan:  Based on a thorough discussion of this condition and the management approach to it (including a comprehensive discussion of the known risks, side effects and potential benefits of treatment), the patient (family) agrees to implement the following specific plan:   Use moisturizer like Eucerin,Cerave or Aveeno Cream 3 times a day for the dry skin               Dry skin refers to skin that feels dry to touch  Dry skin has a dull surface with a rough, scaly quality  The skin is less pliable and cracked  When dryness is severe, the skin may become inflamed and fissured    Although any body site can be dry, dry skin tends to affect the shins more than any other site  Dry skin is lacking moisture in the outer horny cell layer (stratum corneum) and this results in cracks in the skin surface  Dry skin is also called xerosis, xeroderma or asteatosis (lack of fat)  It can affect males and females of all ages  There is some racial variability in water and lipid content of the skin   Dry skin that starts in early childhood may be one of about 20 types of ichthyosis (fish-scale skin)  There is often a family history of dry skin   Dry skin is commonly seen in people with atopic dermatitis   Nearly everyone > 60 years has dry skin  Dry skin that begins later may be seen in people with certain diseases and conditions   Postmenopausal women   Hypothyroidism   Chronic renal disease    Malnutrition and weight loss    Subclinical dermatitis    Treatment with certain drugs such as oral retinoids, diuretics and epidermal growth factor receptor inhibitors      What is the treatment for dry skin? The mainstay of treatment of dry skin and ichthyosis is moisturisers/emollients  They should be applied liberally and often enough to:   Reduce itch    Improve the barrier function    Prevent entry of irritants, bacteria    Reduce transepidermal water loss  How can dry skin be prevented? Eliminate aggravating factors:   Reduce the frequency of bathing   A humidifier in winter and air conditioner in summer    Compare having a short shower with a prolonged soak in a bath   Use lukewarm, not hot, water   Replace standard soap with a substitute such as a synthetic detergent cleanser, water-miscible emollient, bath oil, anti-pruritic tar oil, colloidal oatmeal etc     Apply an emollient liberally and often, particularly shortly after bathing, and when itchy  The drier the skin, the thicker this should be, especially on the hands  What is the outlook for dry skin?   A tendency to dry skin may persist life-long, or it may improve once contributing factors are controlled  ONYCHOMYCOSIS ("FUNGAL NAIL")  Assessment and Plan:  Based on a thorough discussion of this condition and the management approach to it (including a comprehensive discussion of the known risks, side effects and potential benefits of treatment), the patient (family) agrees to implement the following specific plan:   Recommend starting Garamycin 0 3% Solution: Apply topically once a day to right fingernails   Samples of right fingernails taken today to check for bacteria  What are fungal nail infections? Fungal infection of the nails is also known as onychomycosis  It is increasingly common with increased age  It rarely affects children  Which organisms cause onychomycosis? Onychomycosis can be due to:  Dermatophytes such as Trichophyton rubrum (T  rubrum), T  interdigitale (tinea unguium)   Yeasts such as Candida albicans   Molds such as Scopulariopsis brevicaulis and Fusarium species  What are the clinical features of onychomycosis? Onychomycosis may affect one or more toenails and fingernails and most often involves the great toenail or the little toenail  It can present in one or several different patterns   Lateral onychomycosis: a white or yellow opaque streak appears at one side of the nail   Subungual hyperkeratosis: scaling occurs under the nail   Distal onycholysis: the end of the nail lifts  The free edge often crumbles   Superficial white onychomycosis: flaky white patches and pits appear on the top of the nail plate   Proximal onychomycosis:yellow spots appear in the half-moon (lunula)   Onychoma or dermatophytoma:a thick localized area of infection in the nail plate    Destruction of the nail    Tinea unguium often results from untreated tinea pedis (feet) or tinea manuum (hand)  It may follow an injury to the nail or inflammatory disease of the nail    Candida infection of the nail plate generally results from paronychia and starts near the nail fold (the cuticle)  The nail fold is swollen and red, lifted off the nail plate  White, yellow, green or black marks appear on the nearby nail and spread  The nail may lift off its bed and is tender if you press on it  Mold infections are similar in appearance to tinea unguium  Onychomycosis must be distinguished from other nail disorders   Bacterial infection especially Pseudomonas aeruginosa, which turns the nail black or green    Psoriasis    Eczema or dermatitis    Lichen planus    Viral warts    Onycholysis    Onychogryphosis (nail thickening and scaling under the nail), common in the elderly    How is the diagnosis of onychomycosis confirmed? Clippings should be taken from crumbling tissue at the end of the infected nail  The discolored surface of the nails can be scraped off  The debris can be scooped out from under the nail  The clippings and scrapings are sent to a mycology laboratory for microscopy and culture  Previous treatment can reduce the chance of growing the fungus successfully in a culture, so it is best to take the clippings before any treatment is commenced:   To confirm the diagnosis -- antifungal treatment will not be successful if there is another explanation for the nail condition    To identify the responsible organism  Molds and yeasts may require different treatment from dermatophyte fungi   Treatment may be required for a prolonged period and is expensive  Partially treated infection may be impossible to prove for many months as antifungal drugs can be detected even a year later  A nail biopsy may also reveal characteristic histopathological features of onychomycosis  What is the treatment of onychomycosis? Fingernail infections are usually cured more quickly and effectively than toenail infections    Mild infections affecting less than 50% of one or two nails may respond to topical antifungal medications, but cure usually requires an oral antifungal medication for several months  Devices used to treat onychomycosis  Recently, non-drug treatment has been developed to treat onychomycosis thus avoiding the side effects and risks of oral antifungal drugs  Lasers emitting infrared radiation are thought to kill fungi by the production of heat within the infected tissue  Laser treatment is reported to safely eradicate nail fungi with one to three, almost painless, sessions  Several lasers have been approved for this purpose by the FDA and other regulatory authorities  However, high-quality studies of efficacy are lacking, and existing studies indicate that laser treatment is less medically effective than topical or oral antifungal agents  Nd:YAG continuous, long or short-pulsed lasers   Ti:Sapphire modelocked laser   Diode laser    FIBROUS PAPULE ("ANGIOFIBROMA")  Assessment and Plan:  Based on a thorough discussion of this condition and the management approach to it (including a comprehensive discussion of the known risks, side effects and potential benefits of treatment), the patient (family) agrees to implement the following specific plan:   Benign, assurance provided   Did discuss with patient that removal of these papules would be considered cosmetic and the cost would be $150 to treat up to 10 lesions, and if over 10 lesions, then additional $10/lesion thereafter  Discussed with patient that treatment would be done by burning them off and that there is a great chance that the papules would grow back  At this time patient would like to hold off on scheduling but will contact us if she would like papules removed  A fibrous papule is a firm bump that most often occurs on the nose  It is very common and has a characteristic appearance under the microscope  A fibrous papule develops during late adolescence or early adult life on the nose, or less often, elsewhere on the face   It is a dome shaped shiny bump measuring 2-6 mm in diameter, sometimes with a central hair  Although it looks similar to a skin-colored mole (dermal nevus), it is firmer in texture  It is harmless but will not go away on its own  It is important to distinguish fibrous papule from basal cell carcinoma, which may also present as a firm shiny bump  Basal cell carcinoma most often arises later in life  It grows slowly and tends to bleed and ulcerate  A fibrous papule is diagnosed either clinically or by skin biopsy  There are distinctive features  on pathology (proliferation of fibroblasts, fibrotic stroma, and dilated blood vessels)  Fibrous papule is considered a nevus, and develops spontaneously  The precise reason is unknown  Fibrous papules do not require any treatment  If desired it may be removed by a minor surgical procedure (excision biopsy, shave biopsy or electrosurgery)

## 2022-01-25 NOTE — PROGRESS NOTES
[facility] Note     Patient Name: Renae Mcgill  Encounter Date: 1/25/2022     Have you been cared for by a [facility] in the last 3 years and, if so, which one? No    · Have you traveled outside of the [address] [place] [address] in the past 3 months or outside of the [facility] area in the last 2 weeks? No     May we call your Preferred Phone number to discuss your specific medical information? Yes     May we leave a detailed message that includes your specific medical information? Yes      Today's Chief Concerns:   Concern #1:  Nail concern   Concern #2:      Past Medical History:  Have you personally ever had or currently have any of the following? · Skin cancer (such as Melanoma, Basal Cell Carcinoma, Squamous Cell Carcinoma? (If Yes, please provide more detail)- No  · Eczema: No  · Psoriasis: No  · HIV/AIDS: No  · Hepatitis B or C: No  · Tuberculosis: No  · Systemic Immunosuppression such as Diabetes, Biologic or Immunotherapy, Chemotherapy, Organ Transplantation, Bone Marrow Transplantation (If YES, please provide more detail): No  · Radiation Treatment (If YES, please provide more detail): No  · Any other major medical conditions/concerns? (If Yes, which types)- No    Social History:     What is/was your primary occupation?      What are your hobbies/past-times? Family History:  Have any of your "first degree relatives" (parent, brother, sister, or child) had any of the following       · Skin cancer such as Melanoma or Merkel Cell Carcinoma or Pancreatic Cancer? No  · Eczema, Asthma, Hay Fever or Seasonal Allergies: YES, Seasonal allergies  · Psoriasis or Psoriatic Arthritis: No  · Do any other medical conditions seem to run in your family? If Yes, what condition and which relatives?   No    Current Medications:   (please update all dermatological medications before printing patient's AVS!)      Current Outpatient Medications:   Synthroid 137 MCG tablet, TAKE 1 TABLET BY MOUTH EVERY DAY, Disp: 90 tablet, Rfl: 1      Review of Systems:  Have you recently had or currently have any of the following? If YES, what are you doing for the problem? · Fever, chills or unintended weight loss: No  · Sudden loss or change in your vision: No  · Nausea, vomiting or blood in your stool: No  · Painful or swollen joints: No  · Wheezing or cough: YES, Covid  · Changing mole or non-healing wound: YES, back  · Nosebleeds: No  · Excessive sweating: No  · Easy or prolonged bleeding? No  · Over the last 2 weeks, how often have you been bothered by the following problems? · Taking little interest or pleasure in doing things: 1 - Not at All  · Feeling down, depressed, or hopeless: 1 - Not at All  · Rapid heartbeat with epinephrine:  No    · FEMALES ONLY:    · Are you pregnant or planning to become pregnant? No  · Are you currently or planning to be nursing or breast feeding? No    · Any known allergies? Allergies   Allergen Reactions    Bee Venom     Pollen Extract          Physical Exam:     Was a chaperone (Derm Clinical Assistant) present throughout the entire Physical Exam? Yes     Did the Dermatology Team specifically  the patient on the importance of a Full Skin Exam to be sure that nothing is missed clinically?  Yes}  o Did the patient ultimately request or accept a Full Skin Exam?  Yes  o Did the patient specifically refuse to have the areas "under-the-bra" examined by the Dermatologist? Blanca goodman Did the patient specifically refuse to have the areas "under-the-underwear" examined by the Dermatologist? YES    CONSTITUTIONAL:   Vitals:    01/25/22 1018   Temp: (!) 96 8 °F (36 °C)   Weight: 48 1 kg (106 lb)   Height: 4' 11" (1 499 m)         PSYCH: Normal mood and affect  EYES: Normal conjunctiva  ENT: Normal lips and oral mucosa  CARDIOVASCULAR: No edema  RESPIRATORY: Normal respirations  HEME/LYMPH/IMMUNO:  No regional lymphadenopathy except as noted below in "ASSESSMENT AND PLAN BY DIAGNOSIS"    SKIN:  FULL ORGAN SYSTEM EXAM   Hair, Scalp, Ears, Face Normal except as noted below in Assessment   Neck, Cervical Chain Nodes Normal except as noted below in Assessment   Right Arm/Hand/Fingers Normal except as noted below in Assessment   Left Arm/Hand/Fingers Normal except as noted below in Assessment   Chest/Axillae Viewed areas Normal except as noted below in Assessment   Abdomen, Umbilicus Normal except as noted below in Assessment   Back/Spine Normal except as noted below in Assessment   Groin/Genitalia/Buttocks NOT EXAMINED   Right Leg, Foot, Toes Normal except as noted below in Assessment   Left Leg, Foot, Toes Normal except as noted below in Assessment        Assessment and Plan by Diagnosis:    History of Present Condition:     Duration:  How long has this been an issue for you?    o  2 years   Location Affected:  Where on the body is this affecting you?    o  right hand   Quality:  Is there any bleeding, pain, itch, burning/irritation, or redness associated with the skin lesion?    o  No   Severity:  Describe any bleeding, pain, itch, burning/irritation, or redness on a scale of 1 to 10 (with 10 being the worst)  o  N/A   Timing:  Does this condition seem to be there pretty constantly or do you notice it more at specific times throughout the day?    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    -  No  o What have you tried to do to make the condition better?     -  Over the counter nail treatments   Associated Signs and Symptoms:  Does this skin lesion seem to be associated with any of the following:      MELANOCYTIC NEVI ("Moles")    Physical Exam:   Anatomic Location Affected:   Mostly on sun-exposed areas of the trunk and extremities   Morphological Description:  Scattered, 1-4mm round to ovoid, symmetrical-appearing, even bordered, skin colored to dark brown macules/papules, mostly in sun-exposed areas   Pertinent Positives:   Pertinent Negatives: Additional History of Present Condition:  Found on exam today  Assessment and Plan:  Based on a thorough discussion of this condition and the management approach to it (including a comprehensive discussion of the known risks, side effects and potential benefits of treatment), the patient (family) agrees to implement the following specific plan:   When outside we recommend using a wide brim hat, sunglasses, long sleeve and pants, sunscreen with SPF 90+ with reapplication every 2 hours, or SPF specific clothing    Benign, reassured   Annual skin check     Melanocytic Nevi  Melanocytic nevi ("moles") are tan or brown, raised or flat areas of the skin which have an increased number of melanocytes  Melanocytes are the cells in our body which make pigment and account for skin color  Some moles are present at birth (I e , "congenital nevi"), while others come up later in life (i e , "acquired nevi")  The sun can stimulate the body to make more moles  Sunburns are not the only thing that triggers more moles  Chronic sun exposure can do it too  Clinically distinguishing a healthy mole from melanoma may be difficult, even for experienced dermatologists  The "ABCDE's" of moles have been suggested as a means of helping to alert a person to a suspicious mole and the possible increased risk of melanoma  The suggestions for raising alert are as follows:    Asymmetry: Healthy moles tend to be symmetric, while melanomas are often asymmetric  Asymmetry means if you draw a line through the mole, the two halves do not match in color, size, shape, or surface texture   Asymmetry can be a result of rapid enlargement of a mole, the development of a raised area on a previously flat lesion, scaling, ulceration, bleeding or scabbing within the mole  Any mole that starts to demonstrate "asymmetry" should be examined promptly by a board certified dermatologist      Border: Healthy moles tend to have discrete, even borders  The border of a melanoma often blends into the normal skin and does not sharply delineate the mole from normal skin  Any mole that starts to demonstrate "uneven borders" should be examined promptly by a board certified dermatologist      Color: Healthy moles tend to be one color throughout  Melanomas tend to be made up of different colors ranging from dark black, blue, white, or red  Any mole that demonstrates a color change should be examined promptly by a board certified dermatologist      Diameter: Healthy moles tend to be smaller than 0 6 cm in size; an exception are "congenital nevi" that can be larger  Melanomas tend to grow and can often be greater than 0 6 cm (1/4 of an inch, or the size of a pencil eraser)  This is only a guideline, and many normal moles may be larger than 0 6 cm without being unhealthy  Any mole that starts to change in size (small to bigger or bigger to smaller) should be examined promptly by a board certified dermatologist      Evolving: Healthy moles tend to "stay the same "  Melanomas may often show signs of change or evolution such as a change in size, shape, color, or elevation  Any mole that starts to itch, bleed, crust, burn, hurt, or ulcerate or demonstrate a change or evolution should be examined promptly by a board certified dermatologist       LENTIGO    Physical Exam:   Anatomic Location Affected:  Upper extremities    Morphological Description:  Light brown macules   Pertinent Positives:   Pertinent Negatives:     Additional History of Present Condition:  Found on exam today    Assessment and Plan:  Based on a thorough discussion of this condition and the management approach to it (including a comprehensive discussion of the known risks, side effects and potential benefits of treatment), the patient (family) agrees to implement the following specific plan:   When outside we recommend using a wide brim hat, sunglasses, long sleeve and pants, sunscreen with SPF 05+ with reapplication every 2 hours, or SPF specific clothing     What is a lentigo? A lentigo is a pigmented flat or slightly raised lesion with a clearly defined edge  Unlike an ephelis (freckle), it does not fade in the winter months  There are several kinds of lentigo  The name lentigo originally referred to its appearance resembling a small lentil  The plural of lentigo is lentigines, although lentigos is also in common use  Who gets lentigines? Lentigines can affect males and females of all ages and races  Solar lentigines are especially prevalent in fair skinned adults  Lentigines associated with syndromes are present at birth or arise during childhood  What causes lentigines? Common forms of lentigo are due to exposure to ultraviolet radiation:   Sun damage including sunburn    Indoor tanning    Phototherapy, especially photochemotherapy (PUVA)    Ionizing radiation, eg radiation therapy, can also cause lentigines  Several familial syndromes associated with widespread lentigines originate from mutations in Scotty-MAP kinase, mTOR signaling and PTEN pathways  What is the treatment for lentigines? Most lentigines are left alone  Attempts to lighten them may not be successful  The following approaches are used:   SPF 50+ broad-spectrum sunscreen    Hydroquinone bleaching cream    Alpha hydroxy acids    Vitamin C    Retinoids    Azelaic acid    Chemical peels  Individual lesions can be permanently removed using:   Cryotherapy    Intense pulsed light    Pigment lasers    How can lentigines be prevented? Lentigines associated with exposure ultraviolet radiation can be prevented by very careful sun protection   Clothing is more successful at preventing new lentigines than are sunscreens  What is the outlook for lentigines? Lentigines usually persist  They may increase in number with age and sun exposure  Some in sun-protected sites may fade and disappear  TANG ANGIOMAS    Physical Exam:   Anatomic Location Affected:  trunk   Morphological Description:  Scattered cherry red, 1-4 mm papules   Pertinent Positives:   Pertinent Negatives: Additional History of Present Condition:  Found on exam today    Assessment and Plan:  Based on a thorough discussion of this condition and the management approach to it (including a comprehensive discussion of the known risks, side effects and potential benefits of treatment), the patient (family) agrees to implement the following specific plan:   Monitor for changes   Benign, reassured    Assessment and Plan:    Cherry angioma, also known as [employer] spots, are benign vascular skin lesions  A "cherry angioma" is a firm red, blue or purple papule, 0 1-1 cm in diameter  When thrombosed, they can appear black in colour until evaluated with a dermatoscope when the red or purple colour is more easily seen  Cherry angioma may develop on any part of the body but most often appear on the scalp, face, lips and trunk  An angioma is due to proliferating endothelial cells; these are the cells that line the inside of a blood vessel  Angiomas can arise in early life or later in life; the most common type of angioma is a cherry angioma  Cherry angiomas are very common in males and females of any age or race  They are more noticeable in white skin than in skin of colour  They markedly increase in number from about the age of 36  There may be a family history of similar lesions  Eruptive cherry angiomas have been rarely reported to be associated with internal malignancy  The cause of angiomas is unknown   Genetic analysis of cherry angiomas has shown that they frequently carry specific somatic missense mutations in the GNAQ and GNA11 (Q209H) genes, which are involved in other vascular and melanocytic proliferations  XEROSIS ("DRY SKIN")    Physical Exam:   Anatomic Location Affected:  diffuse   Morphological Description:  xerosis   Pertinent Positives:   Pertinent Negatives: Additional History of Present Condition:  Found on exam today  Assessment and Plan:  Based on a thorough discussion of this condition and the management approach to it (including a comprehensive discussion of the known risks, side effects and potential benefits of treatment), the patient (family) agrees to implement the following specific plan:   Use moisturizer like Eucerin,Cerave or Aveeno Cream 3 times a day for the dry skin               Dry skin refers to skin that feels dry to touch  Dry skin has a dull surface with a rough, scaly quality  The skin is less pliable and cracked  When dryness is severe, the skin may become inflamed and fissured  Although any body site can be dry, dry skin tends to affect the shins more than any other site  Dry skin is lacking moisture in the outer horny cell layer (stratum corneum) and this results in cracks in the skin surface  Dry skin is also called xerosis, xeroderma or asteatosis (lack of fat)  It can affect males and females of all ages  There is some racial variability in water and lipid content of the skin   Dry skin that starts in early childhood may be one of about 20 types of ichthyosis (fish-scale skin)  There is often a family history of dry skin   Dry skin is commonly seen in people with atopic dermatitis   Nearly everyone > 60 years has dry skin  Dry skin that begins later may be seen in people with certain diseases and conditions     Postmenopausal women   Hypothyroidism   Chronic renal disease    Malnutrition and weight loss    Subclinical dermatitis    Treatment with certain drugs such as oral retinoids, diuretics and epidermal growth factor receptor inhibitors      What is the treatment for dry skin? The mainstay of treatment of dry skin and ichthyosis is moisturisers/emollients  They should be applied liberally and often enough to:   Reduce itch    Improve the barrier function    Prevent entry of irritants, bacteria    Reduce transepidermal water loss  How can dry skin be prevented? Eliminate aggravating factors:   Reduce the frequency of bathing   A humidifier in winter and air conditioner in summer    Compare having a short shower with a prolonged soak in a bath   Use lukewarm, not hot, water   Replace standard soap with a substitute such as a synthetic detergent cleanser, water-miscible emollient, bath oil, anti-pruritic tar oil, colloidal oatmeal etc     Apply an emollient liberally and often, particularly shortly after bathing, and when itchy  The drier the skin, the thicker this should be, especially on the hands  What is the outlook for dry skin? A tendency to dry skin may persist life-long, or it may improve once contributing factors are controlled  ONYCHOMYCOSIS ("FUNGAL NAIL")    Physical Exam:   Anatomic Location Affected:  Right fingernails   Morphological Description:  Onycholysis yellow white and green discoloration    Pertinent Positives:   Pertinent Negatives: Additional History of Present Condition:  Patient reports right hand nails have been affected for about two years    Assessment and Plan:  Based on a thorough discussion of this condition and the management approach to it (including a comprehensive discussion of the known risks, side effects and potential benefits of treatment), the patient (family) agrees to implement the following specific plan:   Recommend starting gentamycin 0 3% Solution: Apply topically once a day to right fingernails   Samples of right fingernails taken today to check for bacteria and fungus  What are fungal nail infections? Fungal infection of the nails is also known as onychomycosis   It is increasingly common with increased age  It rarely affects children  Which organisms cause onychomycosis? Onychomycosis can be due to:  Dermatophytes such as Trichophyton rubrum (T  rubrum), T  interdigitale (tinea unguium)   Yeasts such as Candida albicans   Molds such as Scopulariopsis brevicaulis and Fusarium species  What are the clinical features of onychomycosis? Onychomycosis may affect one or more toenails and fingernails and most often involves the great toenail or the little toenail  It can present in one or several different patterns   Lateral onychomycosis: a white or yellow opaque streak appears at one side of the nail   Subungual hyperkeratosis: scaling occurs under the nail   Distal onycholysis: the end of the nail lifts  The free edge often crumbles   Superficial white onychomycosis: flaky white patches and pits appear on the top of the nail plate   Proximal onychomycosis:yellow spots appear in the half-moon (lunula)   Onychoma or dermatophytoma:a thick localized area of infection in the nail plate    Destruction of the nail    Tinea unguium often results from untreated tinea pedis (feet) or tinea manuum (hand)  It may follow an injury to the nail or inflammatory disease of the nail  Candida infection of the nail plate generally results from paronychia and starts near the nail fold (the cuticle)  The nail fold is swollen and red, lifted off the nail plate  White, yellow, green or black marks appear on the nearby nail and spread  The nail may lift off its bed and is tender if you press on it  Mold infections are similar in appearance to tinea unguium  Onychomycosis must be distinguished from other nail disorders     Bacterial infection especially Pseudomonas aeruginosa, which turns the nail black or green    Psoriasis    Eczema or dermatitis    Lichen planus    Viral warts    Onycholysis    Onychogryphosis (nail thickening and scaling under the nail), common in the elderly    How is the diagnosis of onychomycosis confirmed? Clippings should be taken from crumbling tissue at the end of the infected nail  The discolored surface of the nails can be scraped off  The debris can be scooped out from under the nail  The clippings and scrapings are sent to a mycology laboratory for microscopy and culture  Previous treatment can reduce the chance of growing the fungus successfully in a culture, so it is best to take the clippings before any treatment is commenced:   To confirm the diagnosis -- antifungal treatment will not be successful if there is another explanation for the nail condition    To identify the responsible organism  Molds and yeasts may require different treatment from dermatophyte fungi   Treatment may be required for a prolonged period and is expensive  Partially treated infection may be impossible to prove for many months as antifungal drugs can be detected even a year later  A nail biopsy may also reveal characteristic histopathological features of onychomycosis  What is the treatment of onychomycosis? Fingernail infections are usually cured more quickly and effectively than toenail infections  Mild infections affecting less than 50% of one or two nails may respond to topical antifungal medications, but cure usually requires an oral antifungal medication for several months  Devices used to treat onychomycosis  Recently, non-drug treatment has been developed to treat onychomycosis thus avoiding the side effects and risks of oral antifungal drugs  Lasers emitting infrared radiation are thought to kill fungi by the production of heat within the infected tissue  Laser treatment is reported to safely eradicate nail fungi with one to three, almost painless, sessions  Several lasers have been approved for this purpose by the FDA and other regulatory authorities   However, high-quality studies of efficacy are lacking, and existing studies indicate that laser treatment is less medically effective than topical or oral antifungal agents  Nd:YAG continuous, long or short-pulsed lasers   Ti:Sapphire modelocked laser   Diode laser    FIBROUS PAPULE ("ANGIOFIBROMA")    Physical Exam:   Anatomic Location Affected:  Nose   Morphological Description:  Pink papules   Pertinent Positives:   Pertinent Negatives: Additional History of Present Condition:  Patient thought there were skin tags and she has been removing them with a nail clipper but states they grow back  Assessment and Plan:  Based on a thorough discussion of this condition and the management approach to it (including a comprehensive discussion of the known risks, side effects and potential benefits of treatment), the patient (family) agrees to implement the following specific plan:   Benign, assurance provided   Did discuss with patient that removal of these papules would be considered cosmetic and the cost would be $150 to treat up to 10 lesions, and if over 10 lesions, then additional $10/lesion thereafter  Discussed with patient that treatment would be done by burning them off and that there is a great chance that the papules would grow back  At this time patient would like to hold off on scheduling but will contact us if she would like papules removed  A fibrous papule is a firm bump that most often occurs on the nose  It is very common and has a characteristic appearance under the microscope  A fibrous papule develops during late adolescence or early adult life on the nose, or less often, elsewhere on the face  It is a dome shaped shiny bump measuring 2-6 mm in diameter, sometimes with a central hair  Although it looks similar to a skin-colored mole (dermal nevus), it is firmer in texture  It is harmless but will not go away on its own  It is important to distinguish fibrous papule from basal cell carcinoma, which may also present as a firm shiny bump   Basal cell carcinoma most often arises later in life  It grows slowly and tends to bleed and ulcerate  A fibrous papule is diagnosed either clinically or by skin biopsy  There are distinctive features  on pathology (proliferation of fibroblasts, fibrotic stroma, and dilated blood vessels)  Fibrous papule is considered a nevus, and develops spontaneously  The precise reason is unknown  Fibrous papules do not require any treatment  If desired it may be removed by a minor surgical procedure (excision biopsy, shave biopsy or electrosurgery)            Scribe Attestation    I,:  Gabbie Arnett am acting as a scribe while in the presence of the attending physician :       I,:  Marilee Linares MD personally performed the services described in this documentation    as scribed in my presence :

## 2022-01-27 LAB
BACTERIA WND AEROBE CULT: NORMAL
GRAM STN SPEC: NORMAL

## 2022-01-31 ENCOUNTER — TELEPHONE (OUTPATIENT)
Dept: FAMILY MEDICINE CLINIC | Facility: MEDICAL CENTER | Age: 37
End: 2022-01-31

## 2022-01-31 DIAGNOSIS — E03.8 HYPOTHYROIDISM DUE TO HASHIMOTO'S THYROIDITIS: Primary | ICD-10-CM

## 2022-01-31 DIAGNOSIS — E06.3 HYPOTHYROIDISM DUE TO HASHIMOTO'S THYROIDITIS: Primary | ICD-10-CM

## 2022-01-31 NOTE — TELEPHONE ENCOUNTER
S/w patient   Aware  States she was taking 150 mcg x 2 weeks, not 225 mcg  Per Dr Orlando Khan still get the b/w in six weeks    Order placed

## 2022-01-31 NOTE — TELEPHONE ENCOUNTER
Please call patient  She cannot take the higher dose of Synthroid unless directed to do so as this can cause cardiac problems  Unclear why she was taking the higher dose as Synthroid 137 mcg was just refilled on 1/18/2022  Patient will need to continue with the 137 mcg dose for six weeks before she goes for repeat thyroid labs

## 2022-01-31 NOTE — TELEPHONE ENCOUNTER
----- Message from Zackary Aguiar MA sent at 1/31/2022  9:13 AM EST -----  Regarding: FW: Refill Request    ----- Message -----  From: Obed Ruano  Sent: 1/28/2022   5:26 PM EST  To: Zackary Aguiar MA  Subject: Refill Request                                   Need a refill for the 137 I have been taking 225 since I ran out of the 137 will get blood work done tomorrow at [facility] in [facility]

## 2022-02-28 LAB — FUNGUS SPEC CULT: NORMAL

## 2022-04-01 ENCOUNTER — OFFICE VISIT (OUTPATIENT)
Dept: URGENT CARE | Facility: CLINIC | Age: 37
End: 2022-04-01
Payer: COMMERCIAL

## 2022-04-01 ENCOUNTER — TELEPHONE (OUTPATIENT)
Dept: DERMATOLOGY | Facility: CLINIC | Age: 37
End: 2022-04-01

## 2022-04-01 VITALS
RESPIRATION RATE: 18 BRPM | BODY MASS INDEX: 21.37 KG/M2 | WEIGHT: 106 LBS | TEMPERATURE: 98.4 F | HEIGHT: 59 IN | HEART RATE: 80 BPM | OXYGEN SATURATION: 98 %

## 2022-04-01 DIAGNOSIS — J01.00 ACUTE NON-RECURRENT MAXILLARY SINUSITIS: Primary | ICD-10-CM

## 2022-04-01 DIAGNOSIS — B35.1 ONYCHOMYCOSIS: Primary | ICD-10-CM

## 2022-04-01 PROCEDURE — 99213 OFFICE O/P EST LOW 20 MIN: CPT

## 2022-04-01 RX ORDER — AMOXICILLIN AND CLAVULANATE POTASSIUM 875; 125 MG/1; MG/1
1 TABLET, FILM COATED ORAL EVERY 12 HOURS SCHEDULED
Qty: 14 TABLET | Refills: 0 | Status: SHIPPED | OUTPATIENT
Start: 2022-04-01 | End: 2022-04-08

## 2022-04-01 NOTE — PATIENT INSTRUCTIONS
Use flonase nasal spray and saline nasal rinse  OTC decongestants  If symptoms do not improve or worsen in the next 2 days, may start antibiotics as prescribed  Cool mist humidifier  If you develop any new or concerning symptoms, please return or proceed to ER  Follow up with PCP in 3-5 days  Sinusitis   WHAT YOU NEED TO KNOW:   Sinusitis is inflammation or infection of your sinuses  Sinusitis is most often caused by a virus  Acute sinusitis may last up to 12 weeks  Chronic sinusitis lasts longer than 12 weeks  Recurrent sinusitis means you have 4 or more infections in 1 year  DISCHARGE INSTRUCTIONS:   Return to the emergency department if:   · You have trouble breathing or wheezing that is getting worse  · You have a stiff neck, a fever, or a bad headache  · You cannot open your eye  · Your eyeball bulges out or you cannot move your eye  · You are more sleepy than normal, or you notice changes in your ability to think, move, or talk  · You have swelling of your forehead or scalp  Call your doctor if:   · You have vision changes, such as double vision  · Your eye and eyelid are red, swollen, and painful  · Your symptoms do not improve or go away after 10 days  · You have nausea and are vomiting  · Your nose is bleeding  · You have questions or concerns about your condition or care  Medicines: Your symptoms may go away on their own  Your healthcare provider may recommend watchful waiting for up to 10 days before starting antibiotics  You may need any of the following:  · Acetaminophen  decreases pain and fever  It is available without a doctor's order  Ask how much to take and how often to take it  Follow directions  Read the labels of all other medicines you are using to see if they also contain acetaminophen, or ask your doctor or pharmacist  Acetaminophen can cause liver damage if not taken correctly   Do not use more than 4 grams (4,000 milligrams) total of acetaminophen in one day  · NSAIDs , such as ibuprofen, help decrease swelling, pain, and fever  This medicine is available with or without a doctor's order  NSAIDs can cause stomach bleeding or kidney problems in certain people  If you take blood thinner medicine, always ask your healthcare provider if NSAIDs are safe for you  Always read the medicine label and follow directions  · Nasal steroid sprays  may help decrease inflammation in your nose and sinuses  · Decongestants  help reduce swelling and drain mucus in the nose and sinuses  They may help you breathe easier  · Antihistamines  help dry mucus in the nose and relieve sneezing  · Antibiotics  help treat or prevent a bacterial infection  · Take your medicine as directed  Contact your healthcare provider if you think your medicine is not helping or if you have side effects  Tell him or her if you are allergic to any medicine  Keep a list of the medicines, vitamins, and herbs you take  Include the amounts, and when and why you take them  Bring the list or the pill bottles to follow-up visits  Carry your medicine list with you in case of an emergency  Self-care:   · Rinse your sinuses as directed  Use a sinus rinse device to rinse your nasal passages with a saline (salt water) solution or distilled water  Do not use tap water  This will help thin the mucus in your nose and rinse away pollen and dirt  It will also help reduce swelling so you can breathe normally  · Use a humidifier  to increase air moisture in your home  This may make it easier for you to breathe and help decrease your cough  · Sleep with your head elevated  Place an extra pillow under your head before you go to sleep to help your sinuses drain  · Drink liquids as directed  Ask your healthcare provider how much liquid to drink each day and which liquids are best for you  Liquids will thin the mucus in your nose and help it drain   Avoid drinks that contain alcohol or caffeine  · Do not smoke, and avoid secondhand smoke  Nicotine and other chemicals in cigarettes and cigars can make your symptoms worse  Ask your healthcare provider for information if you currently smoke and need help to quit  E-cigarettes or smokeless tobacco still contain nicotine  Talk to your healthcare provider before you use these products  Prevent the spread of germs:   · Wash your hands often with soap and water  Wash your hands after you use the bathroom, change a child's diaper, or sneeze  Wash your hands before you prepare or eat food  · Stay away from people who are sick  Some germs spread easily and quickly through contact  Follow up with your doctor as directed: You may be referred to an ear, nose, and throat specialist  Write down your questions so you remember to ask them during your visits  © Copyright [employer] 2022 Information is for End User's use only and may not be sold, redistributed or otherwise used for commercial purposes  All illustrations and images included in CareNotes® are the copyrighted property of A D A M , Inc  or [facility] Bennett Singh   The above information is an  only  It is not intended as medical advice for individual conditions or treatments  Talk to your doctor, nurse or pharmacist before following any medical regimen to see if it is safe and effective for you

## 2022-04-01 NOTE — PROGRESS NOTES
[facility]        NAME: Lillie Schofield is a 39 y o  female  : 1985    MRN: [id number]  DATE: 2022  TIME: 2:02 PM    Assessment and Plan   Acute non-recurrent maxillary sinusitis [J01 00]  1  Acute non-recurrent maxillary sinusitis  amoxicillin-clavulanate (AUGMENTIN) 875-125 mg per tablet         Patient Instructions     Patient Instructions      Use flonase nasal spray and saline nasal rinse  OTC decongestants  If symptoms do not improve or worsen in the next 2 days, may start antibiotics as prescribed  Cool mist humidifier  If you develop any new or concerning symptoms, please return or proceed to ER  Follow up with PCP in 3-5 days  Sinusitis   WHAT YOU NEED TO KNOW:   Sinusitis is inflammation or infection of your sinuses  Sinusitis is most often caused by a virus  Acute sinusitis may last up to 12 weeks  Chronic sinusitis lasts longer than 12 weeks  Recurrent sinusitis means you have 4 or more infections in 1 year  DISCHARGE INSTRUCTIONS:   Return to the emergency department if:   · You have trouble breathing or wheezing that is getting worse  · You have a stiff neck, a fever, or a bad headache  · You cannot open your eye  · Your eyeball bulges out or you cannot move your eye  · You are more sleepy than normal, or you notice changes in your ability to think, move, or talk  · You have swelling of your forehead or scalp  Call your doctor if:   · You have vision changes, such as double vision  · Your eye and eyelid are red, swollen, and painful  · Your symptoms do not improve or go away after 10 days  · You have nausea and are vomiting  · Your nose is bleeding  · You have questions or concerns about your condition or care  Medicines: Your symptoms may go away on their own  Your healthcare provider may recommend watchful waiting for up to 10 days before starting antibiotics   You may need any of the following:  · Acetaminophen decreases pain and fever  It is available without a doctor's order  Ask how much to take and how often to take it  Follow directions  Read the labels of all other medicines you are using to see if they also contain acetaminophen, or ask your doctor or pharmacist  Acetaminophen can cause liver damage if not taken correctly  Do not use more than 4 grams (4,000 milligrams) total of acetaminophen in one day  · NSAIDs , such as ibuprofen, help decrease swelling, pain, and fever  This medicine is available with or without a doctor's order  NSAIDs can cause stomach bleeding or kidney problems in certain people  If you take blood thinner medicine, always ask your healthcare provider if NSAIDs are safe for you  Always read the medicine label and follow directions  · Nasal steroid sprays  may help decrease inflammation in your nose and sinuses  · Decongestants  help reduce swelling and drain mucus in the nose and sinuses  They may help you breathe easier  · Antihistamines  help dry mucus in the nose and relieve sneezing  · Antibiotics  help treat or prevent a bacterial infection  · Take your medicine as directed  Contact your healthcare provider if you think your medicine is not helping or if you have side effects  Tell him or her if you are allergic to any medicine  Keep a list of the medicines, vitamins, and herbs you take  Include the amounts, and when and why you take them  Bring the list or the pill bottles to follow-up visits  Carry your medicine list with you in case of an emergency  Self-care:   · Rinse your sinuses as directed  Use a sinus rinse device to rinse your nasal passages with a saline (salt water) solution or distilled water  Do not use tap water  This will help thin the mucus in your nose and rinse away pollen and dirt  It will also help reduce swelling so you can breathe normally  · Use a humidifier  to increase air moisture in your home   This may make it easier for you to breathe and help decrease your cough  · Sleep with your head elevated  Place an extra pillow under your head before you go to sleep to help your sinuses drain  · Drink liquids as directed  Ask your healthcare provider how much liquid to drink each day and which liquids are best for you  Liquids will thin the mucus in your nose and help it drain  Avoid drinks that contain alcohol or caffeine  · Do not smoke, and avoid secondhand smoke  Nicotine and other chemicals in cigarettes and cigars can make your symptoms worse  Ask your healthcare provider for information if you currently smoke and need help to quit  E-cigarettes or smokeless tobacco still contain nicotine  Talk to your healthcare provider before you use these products  Prevent the spread of germs:   · Wash your hands often with soap and water  Wash your hands after you use the bathroom, change a child's diaper, or sneeze  Wash your hands before you prepare or eat food  · Stay away from people who are sick  Some germs spread easily and quickly through contact  Follow up with your doctor as directed: You may be referred to an ear, nose, and throat specialist  Write down your questions so you remember to ask them during your visits  © Copyright [employer] 2022 Information is for End User's use only and may not be sold, redistributed or otherwise used for commercial purposes  All illustrations and images included in CareNotes® are the copyrighted property of A D A M , Inc  or Kayli Weldon  The above information is an  only  It is not intended as medical advice for individual conditions or treatments  Talk to your doctor, nurse or pharmacist before following any medical regimen to see if it is safe and effective for you  Follow up with PCP in 3-5 days  Proceed to  ER if symptoms worsen      Chief Complaint     Chief Complaint   Patient presents with    Cold Like Symptoms     nasal congestion, cough and sore throat x a few days  tried OTC cold medications with little relief  History of Present Illness   Nola Mendez is a 39 y o  female who presents today for evaluation of sinus pain and congestion x 5 days  She denies any recent sick contacts, but she is a   She had Covid in January of this year  Sinusitis  This is a new problem  Episode onset: 5 days ago  The problem is unchanged  There has been no fever  The pain is mild  Associated symptoms include congestion, coughing, headaches, sinus pressure and a sore throat  Pertinent negatives include no chills, ear pain or shortness of breath  Past treatments include oral decongestants  The treatment provided no relief  Review of Systems   Review of Systems   Constitutional: Negative for chills, fatigue and fever  HENT: Positive for congestion, postnasal drip, rhinorrhea, sinus pressure, sinus pain and sore throat  Negative for ear pain  Respiratory: Positive for cough  Negative for chest tightness, shortness of breath and wheezing  Cardiovascular: Negative for chest pain and palpitations  Gastrointestinal: Negative for abdominal pain, diarrhea, nausea and vomiting  Musculoskeletal: Negative for arthralgias and myalgias  Skin: Negative for color change and rash  Neurological: Positive for headaches           Current Medications       Current Outpatient Medications:     Synthroid 137 MCG tablet, TAKE 1 TABLET BY MOUTH EVERY DAY, Disp: 90 tablet, Rfl: 1    amoxicillin-clavulanate (AUGMENTIN) 875-125 mg per tablet, Take 1 tablet by mouth every 12 (twelve) hours for 7 days, Disp: 14 tablet, Rfl: 0    gentamicin (GARAMYCIN) 0 3 % ophthalmic solution, Administer 1 drop to the right eye daily (Patient not taking: Reported on 4/1/2022 ), Disp: 15 mL, Rfl: 5    Current Allergies     Allergies as of 04/01/2022 - Reviewed 04/01/2022   Allergen Reaction Noted    Bee venom  04/12/2016    Pollen extract  02/03/2020 The following portions of the patient's history were reviewed and updated as appropriate: allergies, current medications, past family history, past medical history, past social history, past surgical history and problem list      Past Medical History:   Diagnosis Date    Anxiety 4/12/2016    Cardiac disease     pulmonary stenosis    Depression 9/3/2020    Disease of thyroid gland     hypothyroidism    Lumbar back pain with radiculopathy affecting left lower extremity 9/3/2020    Lumbar back pain with radiculopathy affecting right lower extremity 9/3/2020    Filemon syndrome     Pulmonary stenosis        Past Surgical History:   Procedure Laterality Date    CARDIAC SURGERY      pulmonary stenosis       Family History   Problem Relation Age of Onset    Thyroid disease Mother     Diabetes Father          Medications have been verified  Objective   Pulse 80   Temp 98 4 °F (36 9 °C) (Temporal)   Resp 18   Ht 4' 11" (1 499 m)   Wt 48 1 kg (106 lb)   SpO2 98%   BMI 21 41 kg/m²        Physical Exam     Physical Exam  Vitals and nursing note reviewed  Constitutional:       General: She is not in acute distress  Appearance: Normal appearance  HENT:      Head: Normocephalic and atraumatic  Right Ear: Tympanic membrane and ear canal normal       Left Ear: Tympanic membrane and ear canal normal       Nose: Congestion and rhinorrhea present  Rhinorrhea is clear  Right Sinus: Maxillary sinus tenderness present  Left Sinus: Maxillary sinus tenderness present  Mouth/Throat:      Mouth: Mucous membranes are moist       Pharynx: Uvula midline  Posterior oropharyngeal erythema present  No oropharyngeal exudate  Tonsils: No tonsillar exudate  0 on the right  0 on the left  Eyes:      Conjunctiva/sclera: Conjunctivae normal    Cardiovascular:      Rate and Rhythm: Normal rate and regular rhythm  Heart sounds: Murmur heard         Pulmonary:      Effort: Pulmonary effort is normal       Breath sounds: Normal breath sounds  No wheezing, rhonchi or rales  Lymphadenopathy:      Cervical: No cervical adenopathy  Psychiatric:         Behavior: Behavior normal  Behavior is cooperative

## 2022-04-01 NOTE — LETTER
April 1, 2022     Patient: Ingrid Young   YOB: 1985   Date of Visit: 4/1/2022       To Whom it May Concern:    Milly Carlos was seen in my clinic on 4/1/2022  She may return to work on 04/04/2022  If you have any questions or concerns, please don't hesitate to call           Sincerely,          GABINO Lawler        CC: No Recipients

## 2022-04-09 ENCOUNTER — APPOINTMENT (OUTPATIENT)
Dept: LAB | Facility: CLINIC | Age: 37
End: 2022-04-09
Payer: COMMERCIAL

## 2022-04-09 DIAGNOSIS — B35.1 ONYCHOMYCOSIS: ICD-10-CM

## 2022-04-09 DIAGNOSIS — E06.3 HYPOTHYROIDISM DUE TO HASHIMOTO'S THYROIDITIS: ICD-10-CM

## 2022-04-09 DIAGNOSIS — Z00.00 PHYSICAL EXAM, ANNUAL: ICD-10-CM

## 2022-04-09 DIAGNOSIS — E03.8 HYPOTHYROIDISM DUE TO HASHIMOTO'S THYROIDITIS: ICD-10-CM

## 2022-04-09 LAB
ALBUMIN SERPL BCP-MCNC: 3.9 G/DL (ref 3.5–5)
ALP SERPL-CCNC: 73 U/L (ref 46–116)
ALT SERPL W P-5'-P-CCNC: 18 U/L (ref 12–78)
ANION GAP SERPL CALCULATED.3IONS-SCNC: 3 MMOL/L (ref 4–13)
AST SERPL W P-5'-P-CCNC: 9 U/L (ref 5–45)
BILIRUB SERPL-MCNC: 0.27 MG/DL (ref 0.2–1)
BUN SERPL-MCNC: 12 MG/DL (ref 5–25)
CALCIUM SERPL-MCNC: 9.4 MG/DL (ref 8.3–10.1)
CHLORIDE SERPL-SCNC: 110 MMOL/L (ref 100–108)
CHOLEST SERPL-MCNC: 158 MG/DL
CO2 SERPL-SCNC: 25 MMOL/L (ref 21–32)
CREAT SERPL-MCNC: 0.67 MG/DL (ref 0.6–1.3)
EST. AVERAGE GLUCOSE BLD GHB EST-MCNC: 105 MG/DL
GFR SERPL CREATININE-BSD FRML MDRD: 113 ML/MIN/1.73SQ M
GLUCOSE P FAST SERPL-MCNC: 89 MG/DL (ref 65–99)
HBA1C MFR BLD: 5.3 %
HDLC SERPL-MCNC: 38 MG/DL
LDLC SERPL CALC-MCNC: 104 MG/DL (ref 0–100)
POTASSIUM SERPL-SCNC: 4.5 MMOL/L (ref 3.5–5.3)
PROT SERPL-MCNC: 8.2 G/DL (ref 6.4–8.2)
SODIUM SERPL-SCNC: 138 MMOL/L (ref 136–145)
T4 FREE SERPL-MCNC: 0.94 NG/DL (ref 0.76–1.46)
TRIGL SERPL-MCNC: 79 MG/DL
TSH SERPL DL<=0.05 MIU/L-ACNC: 38.8 UIU/ML (ref 0.45–4.5)

## 2022-04-09 PROCEDURE — 83036 HEMOGLOBIN GLYCOSYLATED A1C: CPT

## 2022-04-09 PROCEDURE — 36415 COLL VENOUS BLD VENIPUNCTURE: CPT

## 2022-04-09 PROCEDURE — 84439 ASSAY OF FREE THYROXINE: CPT

## 2022-04-09 PROCEDURE — 80053 COMPREHEN METABOLIC PANEL: CPT

## 2022-04-09 PROCEDURE — 80061 LIPID PANEL: CPT

## 2022-04-09 PROCEDURE — 84443 ASSAY THYROID STIM HORMONE: CPT

## 2022-04-12 RX ORDER — TERBINAFINE HYDROCHLORIDE 250 MG/1
250 TABLET ORAL DAILY
Qty: 42 TABLET | Refills: 0 | Status: SHIPPED | OUTPATIENT
Start: 2022-04-12 | End: 2022-05-24

## 2022-05-09 ENCOUNTER — TELEPHONE (OUTPATIENT)
Dept: FAMILY MEDICINE CLINIC | Facility: MEDICAL CENTER | Age: 37
End: 2022-05-09

## 2022-05-09 NOTE — TELEPHONE ENCOUNTER
----- Message from Victor Manuel Epps sent at 5/9/2022  1:30 PM EDT -----  Regarding: FW: PLEASE READ & ECHECK-IN FOR YOUR APPOINTMENT ON 5/4/22    ----- Message -----  From: Hossein Godwin  Sent: 5/9/2022   1:01 PM EDT  To: [facility]  Subject: PLEASE READ & ECHECK-IN FOR YOUR APPOINTMENT#    Good afternoon! I was suppose to come in for an appointment last week but my work schedule got changed around  Im alimony out of my synthroid I was on 137 and ran out  I started taking my left over 150 I had  I am almost out of those as well  I know you wanted to see me but is there any way since I had my blood work done I could get either script filled   Thank you

## 2022-05-09 NOTE — TELEPHONE ENCOUNTER
Spoke with pt regarding Rx  Pt unable to schedule appt with a new provider due to her work  She understands that the Rx cannot be sent until she has a scheduled appt  She will check with her work and call back        (Also see Verengo Solart message from pt for beginning of this task)    Routed to [facility]

## 2022-05-11 ENCOUNTER — OFFICE VISIT (OUTPATIENT)
Dept: URGENT CARE | Facility: CLINIC | Age: 37
End: 2022-05-11
Payer: COMMERCIAL

## 2022-05-11 VITALS
BODY MASS INDEX: 22.18 KG/M2 | OXYGEN SATURATION: 99 % | RESPIRATION RATE: 18 BRPM | WEIGHT: 110 LBS | HEIGHT: 59 IN | HEART RATE: 73 BPM | TEMPERATURE: 98.5 F

## 2022-05-11 DIAGNOSIS — H10.33 ACUTE CONJUNCTIVITIS OF BOTH EYES, UNSPECIFIED ACUTE CONJUNCTIVITIS TYPE: Primary | ICD-10-CM

## 2022-05-11 PROCEDURE — 99213 OFFICE O/P EST LOW 20 MIN: CPT | Performed by: PHYSICIAN ASSISTANT

## 2022-05-11 RX ORDER — POLYMYXIN B SULFATE AND TRIMETHOPRIM 1; 10000 MG/ML; [USP'U]/ML
1 SOLUTION OPHTHALMIC EVERY 4 HOURS
Qty: 10 ML | Refills: 0 | Status: SHIPPED | OUTPATIENT
Start: 2022-05-11 | End: 2022-06-03 | Stop reason: ALTCHOICE

## 2022-05-11 NOTE — PROGRESS NOTES
[facility]        NAME: Leonora Almendarez is a 39 y o  female  : 1985    MRN: [id number]  DATE: May 11, 2022  TIME: 12:17 PM    Assessment and Plan   Acute conjunctivitis of both eyes, unspecified acute conjunctivitis type [H10 33]  1  Acute conjunctivitis of both eyes, unspecified acute conjunctivitis type  polymyxin b-trimethoprim (POLYTRIM) ophthalmic solution         Patient Instructions   Patient Instructions   you may return to work after you have been on the eye drops for 24 hours        Follow up with PCP in 3-5 days  Proceed to  ER if symptoms worsen  Chief Complaint     Chief Complaint   Patient presents with    Conjunctivitis     Started today, woke up with discharge on eye          History of Present Illness       Patient presents with bilateral eye redness and discharge  Does work in a  and has had some exposures to [employer]  Also does have allergies this time of year but states this is a lot worse than her usual allergy symptoms so she is concerned she has pinkeye  Denies visual disturbances or painful eye movements  Does not wear contact lenses      Review of Systems   Review of Systems   Constitutional: Negative for fever  Eyes: Positive for discharge, redness and itching  Negative for photophobia, pain and visual disturbance  Psychiatric/Behavioral: Negative for confusion           Current Medications       Current Outpatient Medications:     polymyxin b-trimethoprim (POLYTRIM) ophthalmic solution, Administer 1 drop to both eyes every 4 (four) hours, Disp: 10 mL, Rfl: 0    terbinafine (LamISIL) 250 mg tablet, Take 1 tablet (250 mg total) by mouth daily, Disp: 42 tablet, Rfl: 0    gentamicin (GARAMYCIN) 0 3 % ophthalmic solution, Administer 1 drop to the right eye daily (Patient not taking: Reported on 2022), Disp: 15 mL, Rfl: 5    Synthroid 137 MCG tablet, TAKE 1 TABLET BY MOUTH EVERY DAY, Disp: 90 tablet, Rfl: 1    Current Allergies     Allergies as of 05/11/2022 - Reviewed 05/11/2022   Allergen Reaction Noted    Bee venom  04/12/2016    Pollen extract  02/03/2020            The following portions of the patient's history were reviewed and updated as appropriate: allergies, current medications, past family history, past medical history, past social history, past surgical history and problem list      Past Medical History:   Diagnosis Date    Anxiety 4/12/2016    Cardiac disease     pulmonary stenosis    Depression 9/3/2020    Disease of thyroid gland     hypothyroidism    Lumbar back pain with radiculopathy affecting left lower extremity 9/3/2020    Lumbar back pain with radiculopathy affecting right lower extremity 9/3/2020    Filemon syndrome     Pulmonary stenosis        Past Surgical History:   Procedure Laterality Date    CARDIAC SURGERY      pulmonary stenosis       Family History   Problem Relation Age of Onset    Thyroid disease Mother     Diabetes Father          Medications have been verified  Objective   Pulse 73   Temp 98 5 °F (36 9 °C) (Temporal)   Resp 18   Ht 4' 11" (1 499 m)   Wt 49 9 kg (110 lb)   SpO2 99%   BMI 22 22 kg/m²        Physical Exam     Physical Exam  Constitutional:       Appearance: Normal appearance  Eyes:      Extraocular Movements: Extraocular movements intact  Pupils: Pupils are equal, round, and reactive to light  Comments: Moderately irritating injected right conjunctiva  Mildly irritated/injected left conjunctiva   Neurological:      Mental Status: She is alert     Psychiatric:         Mood and Affect: Mood normal          Behavior: Behavior normal

## 2022-05-11 NOTE — LETTER
May 11, 2022     Patient: Júnior Ontiveros  YOB: 1985  Date of Visit: 5/11/2022      To Whom it May Concern:    Joselin Fox is under my professional care  Carlos Freitas was seen in my office on 5/11/2022  Carlos Freitas may return to work on 05/12/2022 in the afternoon       If you have any questions or concerns, please don't hesitate to call           Sincerely,          RAMBO Rae        CC: No Recipients

## 2022-05-25 DIAGNOSIS — B35.1 ONYCHOMYCOSIS: ICD-10-CM

## 2022-05-25 RX ORDER — TERBINAFINE HYDROCHLORIDE 250 MG/1
TABLET ORAL
Qty: 42 TABLET | Refills: 0 | OUTPATIENT
Start: 2022-05-25

## 2022-06-03 ENCOUNTER — APPOINTMENT (OUTPATIENT)
Dept: LAB | Facility: CLINIC | Age: 37
End: 2022-06-03
Payer: COMMERCIAL

## 2022-06-03 ENCOUNTER — OFFICE VISIT (OUTPATIENT)
Dept: FAMILY MEDICINE CLINIC | Facility: CLINIC | Age: 37
End: 2022-06-03
Payer: COMMERCIAL

## 2022-06-03 VITALS
DIASTOLIC BLOOD PRESSURE: 68 MMHG | SYSTOLIC BLOOD PRESSURE: 120 MMHG | HEART RATE: 68 BPM | WEIGHT: 109 LBS | BODY MASS INDEX: 21.97 KG/M2 | TEMPERATURE: 98.7 F | HEIGHT: 59 IN | OXYGEN SATURATION: 98 %

## 2022-06-03 DIAGNOSIS — E03.8 HYPOTHYROIDISM DUE TO HASHIMOTO'S THYROIDITIS: Primary | ICD-10-CM

## 2022-06-03 DIAGNOSIS — Q87.19 NOONAN'S SYNDROME: ICD-10-CM

## 2022-06-03 DIAGNOSIS — E06.3 HYPOTHYROIDISM DUE TO HASHIMOTO'S THYROIDITIS: ICD-10-CM

## 2022-06-03 DIAGNOSIS — E03.8 HYPOTHYROIDISM DUE TO HASHIMOTO'S THYROIDITIS: ICD-10-CM

## 2022-06-03 DIAGNOSIS — E06.3 HYPOTHYROIDISM DUE TO HASHIMOTO'S THYROIDITIS: Primary | ICD-10-CM

## 2022-06-03 DIAGNOSIS — Q24.9 CONGENITAL HEART DISEASE: ICD-10-CM

## 2022-06-03 LAB
T4 FREE SERPL-MCNC: 0.38 NG/DL (ref 0.76–1.46)
TSH SERPL DL<=0.05 MIU/L-ACNC: 178 UIU/ML (ref 0.45–4.5)

## 2022-06-03 PROCEDURE — 84443 ASSAY THYROID STIM HORMONE: CPT

## 2022-06-03 PROCEDURE — 1036F TOBACCO NON-USER: CPT | Performed by: PHYSICIAN ASSISTANT

## 2022-06-03 PROCEDURE — 3008F BODY MASS INDEX DOCD: CPT | Performed by: PHYSICIAN ASSISTANT

## 2022-06-03 PROCEDURE — 36415 COLL VENOUS BLD VENIPUNCTURE: CPT

## 2022-06-03 PROCEDURE — 99203 OFFICE O/P NEW LOW 30 MIN: CPT | Performed by: PHYSICIAN ASSISTANT

## 2022-06-03 PROCEDURE — 84439 ASSAY OF FREE THYROXINE: CPT

## 2022-06-03 NOTE — PROGRESS NOTES
Assessment/Plan:    No problem-specific Assessment & Plan notes found for this encounter  Diagnoses and all orders for this visit:    Hypothyroidism due to Hashimoto's thyroiditis  -     Ambulatory Referral to Endocrinology; Future  -     TSH, 3rd generation with Free T4 reflex; Future    [place]'s syndrome    Congenital heart disease        Subjective:      Patient ID: Samanta Madera is a 39 y o  female  Patient presents today to establish care  She has a hx [place] syndrome, pulmonary stenosis, hypothyroidism and depression     Depression - mood stable not currently taking any medications  Sees cardiology once a year for hx congenital heart disease and pulmonary stenosis      Screenings:  Last pap - about a year ago  Update thyroid labs today previous TSH in April 38 8  All other labs updated in April and reviewed with patient today      The following portions of the patient's history were reviewed and updated as appropriate: allergies, current medications, past medical history, past social history, past surgical history and problem list     Review of Systems   Constitutional: Negative for chills, fatigue and fever  HENT: Negative for congestion, ear pain, sinus pain, sore throat and trouble swallowing  Eyes: Negative for pain, discharge and redness  Respiratory: Negative for cough, chest tightness, shortness of breath and wheezing  Cardiovascular: Negative for chest pain, palpitations and leg swelling  Gastrointestinal: Negative for abdominal pain, diarrhea, nausea and vomiting  Musculoskeletal: Negative for arthralgias, joint swelling and myalgias  Skin: Negative for rash  Neurological: Negative for dizziness, weakness, numbness and headaches           Objective:      /68 (BP Location: Left arm, Patient Position: Sitting, Cuff Size: Adult)   Pulse 68   Temp 98 7 °F (37 1 °C)   Ht 4' 11" (1 499 m)   Wt 49 4 kg (109 lb)   SpO2 98%   BMI 22 02 kg/m²          Physical Exam  Vitals and nursing note reviewed  Constitutional:       General: She is not in acute distress  Appearance: Normal appearance  She is well-developed  Cardiovascular:      Rate and Rhythm: Normal rate and regular rhythm  Pulmonary:      Effort: Pulmonary effort is normal       Breath sounds: Normal breath sounds  Abdominal:      General: Bowel sounds are normal       Palpations: Abdomen is soft  Abdomen is not rigid  Tenderness: There is no abdominal tenderness  There is no guarding or rebound  Negative signs include Dean's sign and McBurney's sign  Hernia: No hernia is present  Skin:     General: Skin is warm and dry

## 2022-06-06 DIAGNOSIS — E03.8 HYPOTHYROIDISM DUE TO HASHIMOTO'S THYROIDITIS: ICD-10-CM

## 2022-06-06 DIAGNOSIS — E06.3 HYPOTHYROIDISM DUE TO HASHIMOTO'S THYROIDITIS: ICD-10-CM

## 2022-06-06 RX ORDER — LEVOTHYROXINE SODIUM 137 MCG
137 TABLET ORAL DAILY
Qty: 90 TABLET | Refills: 1 | Status: SHIPPED | OUTPATIENT
Start: 2022-06-06 | End: 2022-08-09

## 2022-06-09 ENCOUNTER — HOSPITAL ENCOUNTER (EMERGENCY)
Facility: HOSPITAL | Age: 37
Discharge: HOME | End: 2022-06-09
Attending: EMERGENCY MEDICINE
Payer: COMMERCIAL

## 2022-06-09 ENCOUNTER — APPOINTMENT (EMERGENCY)
Dept: ULTRASOUND IMAGING | Facility: HOSPITAL | Age: 37
End: 2022-06-09
Payer: COMMERCIAL

## 2022-06-09 VITALS
RESPIRATION RATE: 17 BRPM | TEMPERATURE: 97.6 F | SYSTOLIC BLOOD PRESSURE: 93 MMHG | OXYGEN SATURATION: 100 % | HEART RATE: 59 BPM | DIASTOLIC BLOOD PRESSURE: 57 MMHG

## 2022-06-09 DIAGNOSIS — R10.9 ABDOMINAL PAIN: Primary | ICD-10-CM

## 2022-06-09 LAB
ALBUMIN SERPL BCP-MCNC: 4.1 G/DL (ref 3.5–5)
ALP SERPL-CCNC: 52 U/L (ref 46–116)
ALT SERPL W P-5'-P-CCNC: 32 U/L (ref 12–78)
ANION GAP SERPL CALCULATED.3IONS-SCNC: 8 MMOL/L (ref 4–13)
AST SERPL W P-5'-P-CCNC: 20 U/L (ref 5–45)
BASOPHILS # BLD AUTO: 0.05 THOUSANDS/ΜL (ref 0–0.1)
BASOPHILS NFR BLD AUTO: 1 % (ref 0–1)
BILIRUB SERPL-MCNC: 0.3 MG/DL (ref 0.2–1)
BILIRUB UR QL STRIP: NEGATIVE
BUN SERPL-MCNC: 13 MG/DL (ref 5–25)
CALCIUM SERPL-MCNC: 8.7 MG/DL (ref 8.3–10.1)
CHLORIDE SERPL-SCNC: 105 MMOL/L (ref 100–108)
CLARITY UR: CLEAR
CO2 SERPL-SCNC: 25 MMOL/L (ref 21–32)
COLOR UR: NORMAL
CREAT SERPL-MCNC: 0.65 MG/DL (ref 0.6–1.3)
EOSINOPHIL # BLD AUTO: 0.44 THOUSAND/ΜL (ref 0–0.61)
EOSINOPHIL NFR BLD AUTO: 7 % (ref 0–6)
ERYTHROCYTE [DISTWIDTH] IN BLOOD BY AUTOMATED COUNT: 16.5 % (ref 11.6–15.1)
EXT PREG TEST URINE: NEGATIVE
EXT. CONTROL ED NAV: NORMAL
GFR SERPL CREATININE-BSD FRML MDRD: 114 ML/MIN/1.73SQ M
GLUCOSE SERPL-MCNC: 98 MG/DL (ref 65–140)
GLUCOSE UR STRIP-MCNC: NEGATIVE MG/DL
HCT VFR BLD AUTO: 34.9 % (ref 34.8–46.1)
HGB BLD-MCNC: 10.8 G/DL (ref 11.5–15.4)
HGB UR QL STRIP.AUTO: NEGATIVE
IMM GRANULOCYTES # BLD AUTO: 0.03 THOUSAND/UL (ref 0–0.2)
IMM GRANULOCYTES NFR BLD AUTO: 0 % (ref 0–2)
KETONES UR STRIP-MCNC: NEGATIVE MG/DL
LEUKOCYTE ESTERASE UR QL STRIP: NEGATIVE
LYMPHOCYTES # BLD AUTO: 2.32 THOUSANDS/ΜL (ref 0.6–4.47)
LYMPHOCYTES NFR BLD AUTO: 34 % (ref 14–44)
MCH RBC QN AUTO: 24.5 PG (ref 26.8–34.3)
MCHC RBC AUTO-ENTMCNC: 30.9 G/DL (ref 31.4–37.4)
MCV RBC AUTO: 79 FL (ref 82–98)
MONOCYTES # BLD AUTO: 0.76 THOUSAND/ΜL (ref 0.17–1.22)
MONOCYTES NFR BLD AUTO: 11 % (ref 4–12)
NEUTROPHILS # BLD AUTO: 3.15 THOUSANDS/ΜL (ref 1.85–7.62)
NEUTS SEG NFR BLD AUTO: 47 % (ref 43–75)
NITRITE UR QL STRIP: NEGATIVE
NRBC BLD AUTO-RTO: 0 /100 WBCS
PH UR STRIP.AUTO: 6 [PH]
PLATELET # BLD AUTO: 267 THOUSANDS/UL (ref 149–390)
PMV BLD AUTO: 10.6 FL (ref 8.9–12.7)
POTASSIUM SERPL-SCNC: 3.9 MMOL/L (ref 3.5–5.3)
PROT SERPL-MCNC: 7.6 G/DL (ref 6.4–8.2)
PROT UR STRIP-MCNC: NEGATIVE MG/DL
RBC # BLD AUTO: 4.4 MILLION/UL (ref 3.81–5.12)
SODIUM SERPL-SCNC: 138 MMOL/L (ref 136–145)
SP GR UR STRIP.AUTO: 1.01 (ref 1–1.03)
UROBILINOGEN UR QL STRIP.AUTO: 0.2 E.U./DL
WBC # BLD AUTO: 6.75 THOUSAND/UL (ref 4.31–10.16)

## 2022-06-09 PROCEDURE — 96375 TX/PRO/DX INJ NEW DRUG ADDON: CPT

## 2022-06-09 PROCEDURE — 36415 COLL VENOUS BLD VENIPUNCTURE: CPT | Performed by: EMERGENCY MEDICINE

## 2022-06-09 PROCEDURE — 81003 URINALYSIS AUTO W/O SCOPE: CPT | Performed by: EMERGENCY MEDICINE

## 2022-06-09 PROCEDURE — 85025 COMPLETE CBC W/AUTO DIFF WBC: CPT | Performed by: EMERGENCY MEDICINE

## 2022-06-09 PROCEDURE — 99284 EMERGENCY DEPT VISIT MOD MDM: CPT | Performed by: EMERGENCY MEDICINE

## 2022-06-09 PROCEDURE — 76830 TRANSVAGINAL US NON-OB: CPT

## 2022-06-09 PROCEDURE — 99284 EMERGENCY DEPT VISIT MOD MDM: CPT

## 2022-06-09 PROCEDURE — 76856 US EXAM PELVIC COMPLETE: CPT

## 2022-06-09 PROCEDURE — 80053 COMPREHEN METABOLIC PANEL: CPT | Performed by: EMERGENCY MEDICINE

## 2022-06-09 PROCEDURE — 96374 THER/PROPH/DIAG INJ IV PUSH: CPT

## 2022-06-09 PROCEDURE — 81025 URINE PREGNANCY TEST: CPT | Performed by: EMERGENCY MEDICINE

## 2022-06-09 RX ORDER — ONDANSETRON 2 MG/ML
4 INJECTION INTRAMUSCULAR; INTRAVENOUS ONCE
Status: COMPLETED | OUTPATIENT
Start: 2022-06-09 | End: 2022-06-09

## 2022-06-09 RX ORDER — KETOROLAC TROMETHAMINE 30 MG/ML
15 INJECTION, SOLUTION INTRAMUSCULAR; INTRAVENOUS ONCE
Status: COMPLETED | OUTPATIENT
Start: 2022-06-09 | End: 2022-06-09

## 2022-06-09 RX ORDER — ONDANSETRON 4 MG/1
4 TABLET, ORALLY DISINTEGRATING ORAL EVERY 6 HOURS PRN
Qty: 20 TABLET | Refills: 0 | Status: SHIPPED | OUTPATIENT
Start: 2022-06-09

## 2022-06-09 RX ORDER — NAPROXEN 500 MG/1
500 TABLET ORAL 2 TIMES DAILY WITH MEALS
Qty: 20 TABLET | Refills: 0 | Status: SHIPPED | OUTPATIENT
Start: 2022-06-09

## 2022-06-09 RX ADMIN — ONDANSETRON 4 MG: 2 INJECTION INTRAMUSCULAR; INTRAVENOUS at 18:18

## 2022-06-09 RX ADMIN — KETOROLAC TROMETHAMINE 15 MG: 30 INJECTION, SOLUTION INTRAMUSCULAR at 18:18

## 2022-06-09 NOTE — ED PROVIDER NOTES
History  Chief Complaint   Patient presents with    Abdominal Pain     Pain started yesterday on both sides of the lower abdomin, pain shoots around to the back      39 y o  F present w abdominal pain to the lower abdomen, bilaterally  Pain shoots around to the back bilaterally  Has a hx of ovarian cysts - has had pain like this w a ruptured cyst in the past    Denies urinary symptoms, no vaginal discharge, no fevers or chills  She denies a waxing and waning pain  States that the pain has been fairly constant since onset yesterday  Nausea without vomiting  No change in bowel habits  Prior to Admission Medications   Prescriptions Last Dose Informant Patient Reported? Taking? Synthroid 137 MCG tablet   No No   Sig: Take 1 tablet (137 mcg total) by mouth daily      Facility-Administered Medications: None       Past Medical History:   Diagnosis Date    Anxiety 4/12/2016    Cardiac disease     pulmonary stenosis    Depression 9/3/2020    Disease of thyroid gland     hypothyroidism    Lumbar back pain with radiculopathy affecting left lower extremity 9/3/2020    Lumbar back pain with radiculopathy affecting right lower extremity 9/3/2020    Berwind syndrome     Pulmonary stenosis        Past Surgical History:   Procedure Laterality Date    CARDIAC SURGERY      pulmonary stenosis       Family History   Problem Relation Age of Onset    Thyroid disease Mother     Diabetes Father      I have reviewed and agree with the history as documented      E-Cigarette/Vaping    E-Cigarette Use Never User      E-Cigarette/Vaping Substances    Nicotine No     THC No     CBD No     Flavoring No     Other No     Unknown No      Social History     Tobacco Use    Smoking status: Never Smoker    Smokeless tobacco: Never Used   Vaping Use    Vaping Use: Never used   Substance Use Topics    Alcohol use: Yes     Comment: social    Drug use: Never       Review of Systems   Constitutional: Negative for chills and fever  HENT: Negative for congestion and rhinorrhea  Respiratory: Negative for chest tightness and shortness of breath  Cardiovascular: Negative for chest pain and leg swelling  Gastrointestinal: Positive for abdominal pain (Lower bilaterally) and nausea  Negative for vomiting  Genitourinary: Positive for pelvic pain  Negative for difficulty urinating, dysuria, flank pain, frequency, genital sores, hematuria and vaginal discharge  Musculoskeletal: Negative for back pain and neck pain  Skin: Negative for wound  Neurological: Negative for dizziness and headaches  Physical Exam  Physical Exam  Vitals reviewed  Constitutional:       General: She is not in acute distress  Appearance: She is well-developed  She is not ill-appearing, toxic-appearing or diaphoretic  Comments: Appears uncomfortable but in NAD   HENT:      Head: Normocephalic and atraumatic  Eyes:      General: No scleral icterus  Right eye: No discharge  Left eye: No discharge  Conjunctiva/sclera: Conjunctivae normal       Pupils: Pupils are equal, round, and reactive to light  Neck:      Vascular: No JVD  Cardiovascular:      Rate and Rhythm: Normal rate and regular rhythm  Heart sounds: Normal heart sounds  No murmur heard  No friction rub  No gallop  Pulmonary:      Effort: Pulmonary effort is normal  No respiratory distress  Breath sounds: Normal breath sounds  No wheezing or rales  Chest:      Chest wall: No tenderness  Abdominal:      General: Bowel sounds are normal  There is no distension  Palpations: Abdomen is soft  Tenderness: There is abdominal tenderness (lower abd pain) in the suprapubic area  There is no right CVA tenderness, left CVA tenderness, guarding or rebound  Hernia: No hernia is present  Musculoskeletal:         General: No tenderness or deformity  Normal range of motion  Cervical back: Normal range of motion and neck supple  Skin:     General: Skin is warm and dry  Coloration: Skin is not pale  Findings: No erythema or rash  Neurological:      Mental Status: She is alert and oriented to person, place, and time  Cranial Nerves: No cranial nerve deficit     Psychiatric:         Behavior: Behavior normal          Vital Signs  ED Triage Vitals [06/09/22 1705]   Temperature Pulse Respirations Blood Pressure SpO2   97 6 °F (36 4 °C) 68 18 102/61 98 %      Temp Source Heart Rate Source Patient Position - Orthostatic VS BP Location FiO2 (%)   Tympanic Monitor Sitting Left arm --      Pain Score       4           Vitals:    06/09/22 1705 06/09/22 1946   BP: 102/61 93/57   Pulse: 68 59   Patient Position - Orthostatic VS: Sitting Sitting         Visual Acuity      ED Medications  Medications   ketorolac (TORADOL) injection 15 mg (15 mg Intravenous Given 6/9/22 1818)   ondansetron (ZOFRAN) injection 4 mg (4 mg Intravenous Given 6/9/22 1818)       Diagnostic Studies  Results Reviewed     Procedure Component Value Units Date/Time    Comprehensive metabolic panel [[id number]] Collected: 06/09/22 1813    Lab Status: Final result Specimen: Blood from Arm, Right Updated: 06/09/22 1839     Sodium 138 mmol/L      Potassium 3 9 mmol/L      Chloride 105 mmol/L      CO2 25 mmol/L      ANION GAP 8 mmol/L      BUN 13 mg/dL      Creatinine 0 65 mg/dL      Glucose 98 mg/dL      Calcium 8 7 mg/dL      AST 20 U/L      ALT 32 U/L      Alkaline Phosphatase 52 U/L      Total Protein 7 6 g/dL      Albumin 4 1 g/dL      Total Bilirubin 0 30 mg/dL      eGFR 114 ml/min/1 73sq m     Narrative:      [place] guidelines for Chronic Kidney Disease (CKD):     Stage 1 with normal or high GFR (GFR > 90 mL/min/1 73 square meters)    Stage 2 Mild CKD (GFR = 60-89 mL/min/1 73 square meters)    Stage 3A Moderate CKD (GFR = 45-59 mL/min/1 73 square meters)    Stage 3B Moderate CKD (GFR = 30-44 mL/min/1 73 square meters)    Stage 4 Severe CKD (GFR = 15-29 mL/min/1 73 square meters)    Stage 5 End Stage CKD (GFR <15 mL/min/1 73 square meters)  Note: GFR calculation is accurate only with a steady state creatinine    UA w Reflex to Microscopic w Reflex to Culture [[id number]] Collected: 06/09/22 1814    Lab Status: Final result Specimen: Urine, Clean Catch Updated: 06/09/22 1828     Color, UA Light Yellow     Clarity, UA Clear     Specific Gravity, UA 1 010     pH, UA 6 0     Leukocytes, UA Negative     Nitrite, UA Negative     Protein, UA Negative mg/dl      Glucose, UA Negative mg/dl      Ketones, UA Negative mg/dl      Urobilinogen, UA 0 2 E U /dl      Bilirubin, UA Negative     Blood, UA Negative    CBC and differential [[id number]]  (Abnormal) Collected: 06/09/22 1813    Lab Status: Final result Specimen: Blood from Arm, Right Updated: 06/09/22 1820     WBC 6 75 Thousand/uL      RBC 4 40 Million/uL      Hemoglobin 10 8 g/dL      Hematocrit 34 9 %      MCV 79 fL      MCH 24 5 pg      MCHC 30 9 g/dL      RDW 16 5 %      MPV 10 6 fL      Platelets 327 Thousands/uL      nRBC 0 /100 WBCs      Neutrophils Relative 47 %      Immat GRANS % 0 %      Lymphocytes Relative 34 %      Monocytes Relative 11 %      Eosinophils Relative 7 %      Basophils Relative 1 %      Neutrophils Absolute 3 15 Thousands/µL      Immature Grans Absolute 0 03 Thousand/uL      Lymphocytes Absolute 2 32 Thousands/µL      Monocytes Absolute 0 76 Thousand/µL      Eosinophils Absolute 0 44 Thousand/µL      Basophils Absolute 0 05 Thousands/µL     POCT pregnancy, urine [[id number]]  (Normal) Resulted: 06/09/22 1811    Lab Status: Final result Updated: 06/09/22 1811     EXT PREG TEST UR (Ref: Negative) negative     Control valid                 US pelvis complete w transvaginal   Final Result by Fede Mittal DO (06/09 1848)       Normal                       Workstation performed: [id number]                    Procedures  Procedures         ED Course SBIRT 22yo+    Flowsheet Row Most Recent Value   SBIRT (23 yo +)    In order to provide better care to our patients, we are screening all of our patients for alcohol and drug use  Would it be okay to ask you these screening questions? No Filed at: 06/09/2022 1945                    [facility]  Number of Diagnoses or Management Options  Abdominal pain  Diagnosis management comments: 39 y o  F presents w abdominal pain bilateral lower quadrants  Pelvic US to eval for torsion, cyst rupture  No cysts, no free fluid  Patient admits may be menstrual cramping - feels improved after Toradol  given naproxen, Zofran and advised f/u OBGYN  Discussed need for RTED if pain returns/worsens and discussed the possibility or intermittent torsion  Discussed with patient and they understood the risks and benefits of discharge  Patient had opportunity to ask questions regarding care and discharge instructions and had no further questions  Advised follow up with PCP, advised returning if worsening, and discussed disease specific return precautions  Patient understood discharge instructions  Amount and/or Complexity of Data Reviewed  Clinical lab tests: ordered and reviewed  Tests in the radiology section of CPT®: ordered and reviewed        Disposition  Final diagnoses:   Abdominal pain     Time reflects when diagnosis was documented in both MDM as applicable and the Disposition within this note     Time User Action Codes Description Comment    6/9/2022  8:00 PM Vinicius Hopkins Add [R10 9] Abdominal pain       ED Disposition     ED Disposition   Discharge    Condition   Stable    Date/Time   Thu Jun 9, 2022  7:57 PM    Comment   Jasbir Cardozo discharge to home/self care                 Follow-up Information     Follow up With Specialties Details Why Contact Info Additional [address], [facility] Family Medicine Schedule an appointment as soon as possible for a visit  For follow up to ensure improvement, and for further testing and treatment as needed [address] Obstetrics and Gynecology Schedule an appointment as soon as possible for a visit  For follow up to ensure improvement, and for further testing and treatment as needed 189 Jason Ramirez [phone] [address] [place] [address], TERRY/Jalen Friedmannahid ERICKSON, [address] [place] [address]   [phone]    5324 [facility] Emergency Department Emergency Medicine Go to  If symptoms worsen [address] Emergency Department, [address], [address] [place] [address], [facility]          Discharge Medication List as of 6/9/2022  8:02 PM      START taking these medications    Details   naproxen (Naprosyn) 500 mg tablet Take 1 tablet (500 mg total) by mouth 2 (two) times a day with meals As needed for lower abd pain, Starting Thu 6/9/2022, Normal      ondansetron (Zofran ODT) 4 mg disintegrating tablet Take 1 tablet (4 mg total) by mouth every 6 (six) hours as needed for nausea or vomiting, Starting Thu 6/9/2022, Normal         CONTINUE these medications which have NOT CHANGED    Details   Synthroid 137 MCG tablet Take 1 tablet (137 mcg total) by mouth daily, Starting Mon 6/6/2022, Normal             No discharge procedures on file      PDMP Review     None          ED Provider  Electronically Signed by           Venkat Barclay DO  06/15/22 0594

## 2022-06-10 ENCOUNTER — VBI (OUTPATIENT)
Dept: FAMILY MEDICINE CLINIC | Facility: CLINIC | Age: 37
End: 2022-06-10

## 2022-06-10 NOTE — TELEPHONE ENCOUNTER
Luigi Going    ED Visit Information     Ed visit date: 6-9-2022   Diagnosis Description: Abdominal pain      In Network? Yes Candie Peters  Discharge status: Home  Discharged with meds ? Yes  Number of ED visits to date: 1  ED Severity:3     Outreach Information    Outreach successful: No 2  Date letter mailed:no letter for medically necessary ED visits  Date [address] [address] Coordination    Follow up appointment with pcp: no    Transportation issues ?  NA    Value Base Outreach    Outreach type: 3 Day Outreach  Emergent necessity warranted by diagnosis: Yes  [facility] PCP: Yes        06/10/2022 10:38 AM Phone (VBI) Nina Guillory (Self) [phone] (H) Remove  Left Message - LM asking for call back for ED FU    By Shahid Barron MA    06/13/2022 10:33 AM Phone (VBI) Nina Guillroy (Self) [phone] (H) Remove  Left Message - LM asking for call back for ED FU    By Shahid Barron MA

## 2022-06-14 ENCOUNTER — TELEPHONE (OUTPATIENT)
Dept: FAMILY MEDICINE CLINIC | Facility: CLINIC | Age: 37
End: 2022-06-14

## 2022-06-14 NOTE — TELEPHONE ENCOUNTER
Per ER notes it looks like normal labs normal Ultrasound if she is still having symptoms she was instructed to f/u here in office

## 2022-06-14 NOTE — TELEPHONE ENCOUNTER
Pt called and said that she was told by [facility] in [place] that you would look at her results from her ED visit

## 2022-08-09 DIAGNOSIS — E03.8 HYPOTHYROIDISM DUE TO HASHIMOTO'S THYROIDITIS: ICD-10-CM

## 2022-08-09 DIAGNOSIS — E06.3 HYPOTHYROIDISM DUE TO HASHIMOTO'S THYROIDITIS: ICD-10-CM

## 2022-08-09 RX ORDER — LEVOTHYROXINE SODIUM 137 MCG
TABLET ORAL
Qty: 90 TABLET | Refills: 1 | Status: SHIPPED | OUTPATIENT
Start: 2022-08-09 | End: 2022-09-28

## 2022-09-19 NOTE — PROGRESS NOTES
New Patient Progress Note       Chief Complaint   Patient presents with    Hashimoto's Thyroiditis     Patient reports not always compliant with thyroid medication        History of Present Illness:     Deja Knox is a 39 y o  female with hypothyroidism due to Hashimoto's presenting today for a consultation  Past medical history significant for [place] syndrome pulmonary stenosis  She is following with Cardiology  Component      Latest Ref Rng & Units 10/9/2021 4/9/2022 6/3/2022          11:01 AM  7:53 AM  8:56 AM   TSH 3RD GENERATON      0 450 - 4 500 uIU/mL 0 183 (L) 38 800 (H) 178 000 (H)     Component      Latest Ref Rng & Units 10/9/2021 4/9/2022 6/3/2022          11:01 AM  7:53 AM  8:56 AM   Free T4      0 76 - 1 46 ng/dL 1 67 (H) 0 94 0 38 (L)     Onset: 25years old- symptoms of panic attack; workup revealed hypothyroidism    Etiology:  Positive thyroid microsomal antibody/Hashimoto's    Symptoms: brittle nails, dry skin, fatigue, constipation, used to experience cold sensitivity which has resolved, myalgias, arthralgias, menorrhagia, increased anxiety depression    Medications: Synthroid 137 mcg daily    Had initially been on generic levothyroxine but was having difficulty stabilizing TSH  Switched to brand Synthroid- approximately the last 6 months  Reports inconsistency in taking medications  Takes before leaving the house in the morning on an empty stomach  Waits 30 minutes before eating  Is not currently taking it the over-the-counter supplements      Family history: Hypothyroidism in mother  Denies family history of hyperthyroidism, thyroid nodules, and thyroid cancer    Denies any personal history of radiation exposure to head or neck    Denies compressive symptoms including difficulty swallowing or visible swelling at the base of neck       Patient Active Problem List   Diagnosis    Hypothyroidism    Encounter for gynecological examination (general) (routine) without abnormal findings    Filemon syndrome    Nonrheumatic pulmonary valve stenosis    Congenital heart disease    Droopy eyelid, left    Vitamin D deficiency      Past Medical History:   Diagnosis Date    Anxiety 4/12/2016    Cardiac disease     pulmonary stenosis    Depression 9/3/2020    Disease of thyroid gland     hypothyroidism    Lumbar back pain with radiculopathy affecting left lower extremity 9/3/2020    Lumbar back pain with radiculopathy affecting right lower extremity 9/3/2020    Filemon syndrome     Pulmonary stenosis       Past Surgical History:   Procedure Laterality Date    CARDIAC SURGERY      pulmonary stenosis      Family History   Problem Relation Age of Onset    Thyroid disease Mother     Diabetes Father      Social History     Tobacco Use    Smoking status: Never Smoker    Smokeless tobacco: Never Used   Substance Use Topics    Alcohol use: Yes     Comment: social     Allergies   Allergen Reactions    Bee Venom     Pollen Extract        Current Outpatient Medications:     naproxen (Naprosyn) 500 mg tablet, Take 1 tablet (500 mg total) by mouth 2 (two) times a day with meals As needed for lower abd pain, Disp: 20 tablet, Rfl: 0    ondansetron (Zofran ODT) 4 mg disintegrating tablet, Take 1 tablet (4 mg total) by mouth every 6 (six) hours as needed for nausea or vomiting, Disp: 20 tablet, Rfl: 0    Synthroid 137 MCG tablet, TAKE ONE (1) TABLET BY MOUTH DAILY, Disp: 90 tablet, Rfl: 1    Review of Systems   Constitutional: Positive for fatigue  Negative for activity change, appetite change and unexpected weight change  HENT: Negative for dental problem, sore throat, trouble swallowing and voice change  Eyes: Negative for visual disturbance  Respiratory: Negative for cough, chest tightness and shortness of breath  Cardiovascular: Negative for chest pain, palpitations and leg swelling  Gastrointestinal: Positive for constipation  Negative for diarrhea, nausea and vomiting  Endocrine: Negative for cold intolerance and heat intolerance  Genitourinary: Negative for frequency  Musculoskeletal: Positive for arthralgias and myalgias  Negative for back pain and joint swelling  Skin: Negative for wound  Allergic/Immunologic: Positive for environmental allergies  Negative for food allergies  Neurological: Negative for dizziness, tremors, weakness, light-headedness, numbness and headaches  Hematological: Does not bruise/bleed easily  Psychiatric/Behavioral: Positive for dysphoric mood  Negative for confusion, decreased concentration and sleep disturbance  The patient is nervous/anxious  Physical Exam:  Body mass index is 22 02 kg/m²  BP 98/62   Pulse 75   Ht 4' 11" (1 499 m)   Wt 49 4 kg (109 lb)   SpO2 99%   BMI 22 02 kg/m²    Wt Readings from Last 3 Encounters:   09/20/22 49 4 kg (109 lb)   06/03/22 49 4 kg (109 lb)   05/11/22 49 9 kg (110 lb)       Physical Exam  Vitals reviewed  Constitutional:       General: She is not in acute distress  Appearance: She is well-developed  She is not ill-appearing  HENT:      Head: Normocephalic and atraumatic  Eyes:      Pupils: Pupils are equal, round, and reactive to light  Neck:      Thyroid: No thyromegaly  Cardiovascular:      Rate and Rhythm: Normal rate and regular rhythm  Pulses: Normal pulses  Heart sounds: Normal heart sounds  Pulmonary:      Effort: Pulmonary effort is normal       Breath sounds: Normal breath sounds  Abdominal:      General: Bowel sounds are normal  There is no distension  Palpations: Abdomen is soft  Tenderness: There is no abdominal tenderness  Musculoskeletal:      Cervical back: Normal range of motion and neck supple  Right lower leg: No edema  Left lower leg: No edema  Lymphadenopathy:      Cervical: No cervical adenopathy  Skin:     General: Skin is warm and dry  Capillary Refill: Capillary refill takes less than 2 seconds  Neurological:      Mental Status: She is alert and oriented to person, place, and time  Gait: Gait normal    Psychiatric:         Mood and Affect: Mood normal          Behavior: Behavior normal          Labs:       Lab Results   Component Value Date    CREATININE 0 65 06/09/2022    CREATININE 0 67 04/09/2022    CREATININE 0 65 07/10/2021    BUN 13 06/09/2022    K 3 9 06/09/2022     06/09/2022    CO2 25 06/09/2022     eGFR   Date Value Ref Range Status   06/09/2022 114 ml/min/1 73sq m Final       Lab Results   Component Value Date    HDL 38 (L) 04/09/2022    TRIG 79 04/09/2022       Lab Results   Component Value Date    ALT 32 06/09/2022    AST 20 06/09/2022    ALKPHOS 52 06/09/2022       Lab Results   Component Value Date    FREET4 0 38 (L) 06/03/2022         Impression & Plan:    Problem List Items Addressed This Visit        Endocrine    Hypothyroidism - Primary     Counseled on basic pathophysiology of hypothyroidism/Hashimoto's  Counseled on the importance of taking medication consistently and correctly  After she has done so for 6 weeks, complete labs and I will recommend dose adjustment if warranted  Counseled on potential long term complications of poorly regulated hypothyroidism including CHF, osteoporosis, and infertility  Relevant Orders    TSH, 3rd generation Lab Collect    T4, free Lab Collect       Other    Vitamin D deficiency     Given patient's complaint of persistent fatigue and myalgias, check Vit D level  Relevant Orders    Vitamin D 25 hydroxy Lab Collect          Orders Placed This Encounter   Procedures    Vitamin D 25 hydroxy Lab Collect     Standing Status:   Future     Standing Expiration Date:   9/20/2023    TSH, 3rd generation Lab Collect     This is a patient instruction: This test is non-fasting  Please drink two glasses of water morning of bloodwork          Standing Status:   Future     Standing Expiration Date:   9/20/2023    T4, free Lab Collect Standing Status:   Future     Standing Expiration Date:   9/20/2023       There are no Patient Instructions on file for this visit  Discussed with the patient and all questioned fully answered  She will call me if any problems arise  Follow-up appointment in 6 months       Counseled patient on diagnostic results, prognosis, risk and benefit of treatment options, instruction for management, importance of treatment compliance, Risk  factor reduction and impressions      GABINO Rod

## 2022-09-20 ENCOUNTER — CONSULT (OUTPATIENT)
Dept: ENDOCRINOLOGY | Facility: CLINIC | Age: 37
End: 2022-09-20
Payer: COMMERCIAL

## 2022-09-20 VITALS
WEIGHT: 109 LBS | BODY MASS INDEX: 21.97 KG/M2 | HEIGHT: 59 IN | OXYGEN SATURATION: 99 % | DIASTOLIC BLOOD PRESSURE: 62 MMHG | HEART RATE: 75 BPM | SYSTOLIC BLOOD PRESSURE: 98 MMHG

## 2022-09-20 DIAGNOSIS — E55.9 VITAMIN D DEFICIENCY: ICD-10-CM

## 2022-09-20 DIAGNOSIS — E06.3 HYPOTHYROIDISM DUE TO HASHIMOTO'S THYROIDITIS: Primary | ICD-10-CM

## 2022-09-20 DIAGNOSIS — E03.8 HYPOTHYROIDISM DUE TO HASHIMOTO'S THYROIDITIS: Primary | ICD-10-CM

## 2022-09-20 PROCEDURE — 99203 OFFICE O/P NEW LOW 30 MIN: CPT | Performed by: NURSE PRACTITIONER

## 2022-09-20 NOTE — ASSESSMENT & PLAN NOTE
Counseled on basic pathophysiology of hypothyroidism/Hashimoto's  Counseled on the importance of taking medication consistently and correctly  After she has done so for 6 weeks, complete labs and I will recommend dose adjustment if warranted  Counseled on potential long term complications of poorly regulated hypothyroidism including CHF, osteoporosis, and infertility

## 2022-09-28 DIAGNOSIS — E06.3 HYPOTHYROIDISM DUE TO HASHIMOTO'S THYROIDITIS: ICD-10-CM

## 2022-09-28 DIAGNOSIS — E03.8 HYPOTHYROIDISM DUE TO HASHIMOTO'S THYROIDITIS: ICD-10-CM

## 2022-09-28 RX ORDER — LEVOTHYROXINE SODIUM 137 MCG
TABLET ORAL
Qty: 90 TABLET | Refills: 1 | Status: SHIPPED | OUTPATIENT
Start: 2022-09-28

## 2022-12-05 ENCOUNTER — TELEMEDICINE (OUTPATIENT)
Dept: FAMILY MEDICINE CLINIC | Facility: CLINIC | Age: 37
End: 2022-12-05

## 2022-12-05 DIAGNOSIS — F32.A ANXIETY AND DEPRESSION: Primary | ICD-10-CM

## 2022-12-05 DIAGNOSIS — F41.9 ANXIETY AND DEPRESSION: Primary | ICD-10-CM

## 2022-12-05 RX ORDER — ESCITALOPRAM OXALATE 10 MG/1
10 TABLET ORAL DAILY
Qty: 30 TABLET | Refills: 2 | Status: SHIPPED | OUTPATIENT
Start: 2022-12-05

## 2022-12-05 NOTE — PROGRESS NOTES
Virtual Regular Visit    Verification of patient location:    Patient is located in the following state in which I hold an active license [place]      Assessment/Plan:    Problem List Items Addressed This Visit    None  Visit Diagnoses     Anxiety and depression    -  Primary    Relevant Medications    escitalopram (Lexapro) 10 mg tablet    Other Relevant Orders    Ambulatory referral to [facility] patient the importance evaluating thyroid levels  as this can attribute to worsening depression/anxiety  She will have her labs done this weekend  Agreeable to start lexapro  DW patient start low dose and in 3 weeks if tolerating well can increase     Referral placed for Kiersten          Reason for visit is   Chief Complaint   Patient presents with   • Virtual Regular Visit        Encounter provider Gabe Riggs PA-C    Provider located at [address]  [address] [place] [id number]      Recent Visits  Date Type Provider Dept   12/05/22 Telemedicine Lisa Huff PA-C [address] recent visits within past 7 days and meeting all other requirements  Future Appointments  No visits were found meeting these conditions  Showing future appointments within next 150 days and meeting all other requirements       The patient was identified by name and date of birth  Judi Bansal was informed that this is a telemedicine visit and that the visit is being conducted through the [employer]  She agrees to proceed     My office door was closed  No one else was in the room  She acknowledged consent and understanding of privacy and security of the video platform  The patient has agreed to participate and understands they can discontinue the visit at any time  Patient is aware this is a billable service       Subjective  [place] Pa Santiago Citizen is a 40 y o  female follow up on anxiety and depression      Pt presents today for follow up on anxiety and depression   Was on lexapro in 2020 for depression  Notes lack of motivation and feeling anxious   +Mood changes  States it has worsened in the past 2 years since she discontinued lexapro  She states "compared to others I am not where I want to be in life" which seems to trigger the feelings of helplessness  Denies SI/HI    Taking synthroid 137mcg - has not had TSH checked recently         Past Medical History:   Diagnosis Date   • Anxiety 4/12/2016   • Cardiac disease     pulmonary stenosis   • Depression 9/3/2020   • Disease of thyroid gland     hypothyroidism   • Lumbar back pain with radiculopathy affecting left lower extremity 9/3/2020   • Lumbar back pain with radiculopathy affecting right lower extremity 9/3/2020   • Filemon syndrome    • Pulmonary stenosis        Past Surgical History:   Procedure Laterality Date   • CARDIAC SURGERY      pulmonary stenosis       Current Outpatient Medications   Medication Sig Dispense Refill   • escitalopram (Lexapro) 10 mg tablet Take 1 tablet (10 mg total) by mouth daily 30 tablet 2   • naproxen (Naprosyn) 500 mg tablet Take 1 tablet (500 mg total) by mouth 2 (two) times a day with meals As needed for lower abd pain 20 tablet 0   • ondansetron (Zofran ODT) 4 mg disintegrating tablet Take 1 tablet (4 mg total) by mouth every 6 (six) hours as needed for nausea or vomiting 20 tablet 0   • Synthroid 137 MCG tablet TAKE ONE (1) TABLET BY MOUTH DAILY 90 tablet 1     No current facility-administered medications for this visit  Allergies   Allergen Reactions   • Bee Venom    • Pollen Extract        Review of Systems   Constitutional: Negative for chills, fatigue and fever  HENT: Negative for congestion, ear pain, sinus pain, sore throat and trouble swallowing  Eyes: Negative for pain, discharge and redness  Respiratory: Negative for cough, chest tightness, shortness of breath and wheezing      Cardiovascular: Negative for chest pain, palpitations and leg swelling  Gastrointestinal: Negative for abdominal pain, diarrhea, nausea and vomiting  Musculoskeletal: Negative for arthralgias, joint swelling and myalgias  Skin: Negative for rash  Neurological: Negative for dizziness, weakness, numbness and headaches  Psychiatric/Behavioral: Positive for dysphoric mood  Negative for sleep disturbance and suicidal ideas  The patient is nervous/anxious  Video Exam    There were no vitals filed for this visit      Physical Exam     I spent 20 minutes directly with the patient during this visit

## 2022-12-10 ENCOUNTER — APPOINTMENT (OUTPATIENT)
Dept: LAB | Facility: CLINIC | Age: 37
End: 2022-12-10

## 2022-12-10 DIAGNOSIS — E03.8 HYPOTHYROIDISM DUE TO HASHIMOTO'S THYROIDITIS: ICD-10-CM

## 2022-12-10 DIAGNOSIS — E06.3 HYPOTHYROIDISM DUE TO HASHIMOTO'S THYROIDITIS: ICD-10-CM

## 2022-12-10 DIAGNOSIS — E55.9 VITAMIN D DEFICIENCY: ICD-10-CM

## 2022-12-10 LAB
25(OH)D3 SERPL-MCNC: 15 NG/ML (ref 30–100)
T4 FREE SERPL-MCNC: 1.79 NG/DL (ref 0.76–1.46)
TSH SERPL DL<=0.05 MIU/L-ACNC: 0.84 UIU/ML (ref 0.45–4.5)

## 2022-12-13 DIAGNOSIS — E06.3 HYPOTHYROIDISM DUE TO HASHIMOTO'S THYROIDITIS: Primary | ICD-10-CM

## 2022-12-13 DIAGNOSIS — E03.8 HYPOTHYROIDISM DUE TO HASHIMOTO'S THYROIDITIS: Primary | ICD-10-CM

## 2022-12-13 DIAGNOSIS — E55.9 VITAMIN D DEFICIENCY: Primary | ICD-10-CM

## 2022-12-13 RX ORDER — LEVOTHYROXINE SODIUM 125 MCG
125 TABLET ORAL DAILY
Qty: 60 TABLET | Refills: 0 | Status: SHIPPED | OUTPATIENT
Start: 2022-12-13

## 2022-12-13 RX ORDER — ERGOCALCIFEROL 1.25 MG/1
50000 CAPSULE ORAL WEEKLY
Qty: 12 CAPSULE | Refills: 0 | Status: SHIPPED | OUTPATIENT
Start: 2022-12-13

## 2022-12-29 ENCOUNTER — SOCIAL WORK (OUTPATIENT)
Dept: BEHAVIORAL/MENTAL HEALTH CLINIC | Facility: CLINIC | Age: 37
End: 2022-12-29

## 2022-12-29 ENCOUNTER — OFFICE VISIT (OUTPATIENT)
Dept: FAMILY MEDICINE CLINIC | Facility: CLINIC | Age: 37
End: 2022-12-29

## 2022-12-29 VITALS
DIASTOLIC BLOOD PRESSURE: 74 MMHG | TEMPERATURE: 98.9 F | HEART RATE: 79 BPM | BODY MASS INDEX: 22.18 KG/M2 | OXYGEN SATURATION: 100 % | WEIGHT: 110 LBS | HEIGHT: 59 IN | SYSTOLIC BLOOD PRESSURE: 116 MMHG

## 2022-12-29 DIAGNOSIS — F32.A ANXIETY AND DEPRESSION: Primary | ICD-10-CM

## 2022-12-29 DIAGNOSIS — Z00.00 HEALTH MAINTENANCE EXAMINATION: Primary | ICD-10-CM

## 2022-12-29 DIAGNOSIS — F41.9 ANXIETY AND DEPRESSION: Primary | ICD-10-CM

## 2022-12-29 DIAGNOSIS — R19.8 ALTERNATING CONSTIPATION AND DIARRHEA: ICD-10-CM

## 2022-12-29 NOTE — PROGRESS NOTES
Name: Cherelle Reeder      : 1985      MRN: [id number]  Encounter Provider: Thang Hairston PA-C  Encounter Date: 2022   Encounter department: [address] [place] [address]     1  Health maintenance examination    2  Alternating constipation and diarrhea  hx reviewed and updated  Exam unremarkable  For her constipation recommend a daily fiber supplement and daily probiotic  Increase water intake, dietary fibers and exercise  Goal for 1 soft BM daily  Follow up prn  Subjective     Pt presents today for a physical exam  Her only concern today is longstanding alternating constipation and diarrhea  She notes she can go "weeks" without a BM, and then can have several in 1 day  She does note a hx of anxiety and the BMs seem to be loose and more like diarrhea when feeling anxious  No black/bloody stools, abdominal pain, N/V  Otherwise no interval health changes  UTD with PAP  Follows with endocrine for hx of hypothyroid  No changes in FH  Non smoker  No abuse/misuse of drugs or alcohol  Medications reviewed/updated  Review of Systems   Constitutional: Negative for chills, fatigue and fever  HENT: Negative for congestion, ear pain, hearing loss, nosebleeds, postnasal drip, rhinorrhea, sinus pressure, sinus pain, sneezing and sore throat  Eyes: Negative for pain, discharge, itching and visual disturbance  Respiratory: Negative for cough, chest tightness, shortness of breath and wheezing  Cardiovascular: Negative for chest pain, palpitations and leg swelling  Gastrointestinal: Positive for constipation and diarrhea  Negative for abdominal pain, blood in stool, nausea and vomiting  Genitourinary: Negative for frequency and urgency  Neurological: Negative for dizziness, light-headedness and numbness         Past Medical History:   Diagnosis Date   • Anxiety 2016   • Cardiac disease     pulmonary stenosis   • Depression 9/3/2020   • Disease of thyroid gland     hypothyroidism   • Lumbar back pain with radiculopathy affecting left lower extremity 9/3/2020   • Lumbar back pain with radiculopathy affecting right lower extremity 9/3/2020   • Filemon syndrome    • Pulmonary stenosis      Past Surgical History:   Procedure Laterality Date   • CARDIAC SURGERY      pulmonary stenosis     Family History   Problem Relation Age of Onset   • Thyroid disease Mother    • Diabetes Father      Social History     Socioeconomic History   • Marital status: Single     Spouse name: None   • Number of children: None   • Years of education: None   • Highest education level: None   Occupational History   • None   Tobacco Use   • Smoking status: Never   • Smokeless tobacco: Never   Vaping Use   • Vaping Use: Never used   Substance and Sexual Activity   • Alcohol use: Yes     Comment: social   • Drug use: Never   • Sexual activity: Not Currently     Partners: Male     Birth control/protection: None   Other Topics Concern   • None   Social History Narrative   • None     Social Determinants of Health     Financial Resource Strain: Not on file   Food Insecurity: Not on file   Transportation Needs: Not on file   Physical Activity: Not on file   Stress: Not on file   Social Connections: Not on file   Intimate Partner Violence: Not on file   Housing Stability: Not on file     Current Outpatient Medications on File Prior to Visit   Medication Sig   • ergocalciferol (VITAMIN D2) 50,000 units Take 1 capsule (50,000 Units total) by mouth once a week   • escitalopram (Lexapro) 10 mg tablet Take 1 tablet (10 mg total) by mouth daily   • naproxen (Naprosyn) 500 mg tablet Take 1 tablet (500 mg total) by mouth 2 (two) times a day with meals As needed for lower abd pain   • ondansetron (Zofran ODT) 4 mg disintegrating tablet Take 1 tablet (4 mg total) by mouth every 6 (six) hours as needed for nausea or vomiting   • Synthroid 125 MCG tablet Take 1 tablet (125 mcg total) by mouth daily     Allergies Allergen Reactions   • Bee Venom    • Pollen Extract      Immunization History   Administered Date(s) Administered   • Influenza, injectable, quadrivalent, preservative free 0 5 mL 10/02/2020, 10/12/2021   • Tdap 01/27/2017   • Tuberculin Skin Test-PPD Intradermal 10/02/2020       Objective     /74   Pulse 79   Temp 98 9 °F (37 2 °C)   Ht 4' 11" (1 499 m)   Wt 49 9 kg (110 lb)   SpO2 100%   BMI 22 22 kg/m²     Physical Exam  Vitals and nursing note reviewed  Constitutional:       General: She is not in acute distress  Appearance: Normal appearance  HENT:      Head: Normocephalic and atraumatic  Right Ear: Tympanic membrane, ear canal and external ear normal       Left Ear: Tympanic membrane, ear canal and external ear normal       Nose: Nose normal       Mouth/Throat:      Mouth: Mucous membranes are moist       Pharynx: Oropharynx is clear  No oropharyngeal exudate or posterior oropharyngeal erythema  Eyes:      Pupils: Pupils are equal, round, and reactive to light  Cardiovascular:      Rate and Rhythm: Normal rate and regular rhythm  Heart sounds: Normal heart sounds  Pulmonary:      Effort: Pulmonary effort is normal  No respiratory distress  Breath sounds: Normal breath sounds  No wheezing, rhonchi or rales  Abdominal:      General: Bowel sounds are normal  There is no distension  Palpations: Abdomen is soft  Tenderness: There is no abdominal tenderness  There is no guarding  Musculoskeletal:         General: Normal range of motion  Cervical back: Normal range of motion and neck supple  Right lower leg: No edema  Left lower leg: No edema  Skin:     General: Skin is warm and dry  Neurological:      Mental Status: She is alert and oriented to person, place, and time     Psychiatric:         Mood and Affect: Mood and affect normal        Carmelo Mendoza PA-C

## 2022-12-29 NOTE — PSYCH
Assessment/Plan: Initial visit for treatment of depression and anxiety  She had open heart surgery when she was 10 and she woke up in the middle of the surgery  Diagnoses and all orders for this visit:    Anxiety and depression        Subjective: 2019 her depression and anxiety worsened, and she cannot relate a cause other than where she is in her life as she is not  with kids, as her siblings are     Patient ID: Emma Ritter is a 40 y o  female  HPI: Hypothyroidism, pulmonary Stenosis, manny syndrome (genetic disorder) She had open heart surgery when she was 10    Pre-morbid level of function and History of Present Illness: worsened in 2019 (start of Covid)  Previous Psychiatric/psychological treatment/year: Denies  Current Psychiatrist/Therapist: Denies  Outpatient and/or Partial and Other [employer] Used (CTT, ICM, VNA): Integration      Problem Assessment:     SOCIAL/VOCATION:  Family Constellation (include parents, relationship with each and pertinent Psych/Medical History):     Family History   Problem Relation Age of Onset   • Thyroid disease Mother    • Diabetes Father        Mother: Mom and her are good but have had their ups and downs   She lives in [place] with her step dad  Spouse: No current relationship   Father: Lives with dad and she they get along very well  Children: None  Siblin yo brother, they have a good relationship, he is  with 2 kids  Lives local  Siblinyear old sister (local) they have their ups and downs,  with 2 of her own children and a step daughter  Siblin/2 brother and 1/2 sister from dad, she gets along with her sister and she does not talk to her brother   Sibling: 3 stepsisters from her step dad, she states she only sees them when there are family get togethers    Yulisa Mason relates best to Does not feel like she really relates to anyone she lives with with her dad  she does not live alone       Domestic Violence: No past history of domestic violence, There is no history of child abuse and Denies all and any abuse    Additional Comments related to family/relationships/peer support: She does feel with family and friends she has a good support system and she does reach out now        School or Work History (strengths/limitations/needs): 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     Her highest grade level achieved was Graduated high school, one semester of college and graduated cosmetology school   history includes  None    Financial status includes She feels she is okay but she is not doing fantastic and is not struggling    LEISURE ASSESSMENT (Include past and present hobbies/interests and level of involvement (Ex: Group/Club Affiliations): Go out to eat, hang out with friends and family, boating, road trips  her primary language is [place]  Preferred language is [place]  Ethnic considerations are None  Religions affiliations and level of involvement None   Does spirituality help you cope? No    FUNCTIONAL STATUS: There has been a recent change in [place] ability to do the following: None    Level of Assistance Needed/By Whom?: Wallace Becker learns best by  demonstration and 1:1 and hands on    SUBSTANCE ABUSE ASSESSMENT: no substance abuse    Substance/Route/Age/Amount/Frequency/Last Use: None    DETOX HISTORY: None    Previous detox/rehab treatment: None    HEALTH ASSESSMENT: no referral to PCP needed    LEGAL: No Mental Health Advance Directive or Power of  on file    Prenatal History: uneventful pregnancy    Delivery History: born following complications during labor/delivery and She was born 3 months premature    Developmental Milestones: N/A  Temperament as an infant was N/A  Temperament as a toddler was N/A  Temperament at school age was N/A  Temperament as a teenager was N/A      Risk Assessment:   The following ratings are based on my interview(s) with Katherin    Risk of Harm to Self: Demographic risk factors include   Historical Risk Factors include history of impulsive behaviors  Recent Specific Risk Factors include diagnosis of depression   Additional Factors for a Child or Adolescent Adult    Risk of Harm to Others:   Demographic Risk Factors include None  Historical Risk Factors include victim of childhood bullying  Recent Specific Risk Factors include multiple stressors    Access to Weapons:   Blake Lantigua has access to the following weapons: Gun she has her own gun  The following steps have been taken to ensure weapons are properly secured: it is secured    Based on the above information, the client presents the following risk of harm to self or others:  low    The following interventions are recommended:   no intervention changes    Notes regarding this Risk Assessment: She does have a gun but keeps it in another room and she has it to protect herself not to do anything to herself  She is friendly and cooperative  She denies ever having a long term relationship  She was fired from her [employer] job and she was there for 3 years and it was not a good way that she was let go and 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  at this time  She is appropriately dressed in a casual manner, being well groomed  Her discussed her life not being where she thought it would be right now  She is financially dependent on her dad  Her dad is 68  She thinks of it everyday, what happens to her when something happens to dad          Review Of Systems:     Mood Normal   Behavior Normal    Thought Content Normal   General Normal    Personality Normal   Other Psych Symptoms Normal   Constitutional Normal   ENT Normal   Cardiovascular Normal    Respiratory Normal    Gastrointestinal Normal   Genitourinary Normal    Musculoskeletal Negative   Integumentary Normal    Neurological Normal    Endocrine Normal          Mental status:  Appearance calm and cooperative , adequate hygiene and grooming and good eye contact    Mood mood appropriate   Affect affect appropriate    Speech a normal rate and fluent   Thought Processes coherent/organized and normal thought processes   Hallucinations no hallucinations present    Thought Content no delusions   Abnormal Thoughts no suicidal thoughts  and no homicidal thoughts    Orientation  oriented to person, place and time, oriented to person, oriented to place and oriented to time   Remote Memory short term memory intact and long term memory intact   Attention Span concentration intact   Intellect Appears to be of [place] of Knowledge displays adequate knowledge of current events, adequate fund of knowledge regarding past history and adequate fund of knowledge regarding vocabulary    Insight Insight intact   Judgement judgment was intact   Muscle Strength Not assessed   Language no difficulty naming common objects, no difficulty repeating a phrase  and no difficulty writing a sentence    Pain none   Pain Scale 0     Visit start and stop times:    12/29/22  Start Time: 1550  Stop Time: 1636  Total Visit Time: 46 minutes

## 2023-01-16 ENCOUNTER — PATIENT MESSAGE (OUTPATIENT)
Dept: ENDOCRINOLOGY | Facility: CLINIC | Age: 38
End: 2023-01-16

## 2023-01-17 NOTE — TELEPHONE ENCOUNTER
From: Karlene Windsor  To: Alessia Rivera  Sent: 1/16/2023 1:41 PM EST  Subject: Bruising     Good afternoon  I have a question last week I was scratching my leg I have dry skin in the winter, And that night it bruised pretty bad and big is that normal? It’s happened a time or two before

## 2023-01-24 DIAGNOSIS — F32.A ANXIETY AND DEPRESSION: ICD-10-CM

## 2023-01-24 DIAGNOSIS — F41.9 ANXIETY AND DEPRESSION: ICD-10-CM

## 2023-01-24 RX ORDER — ESCITALOPRAM OXALATE 10 MG/1
10 TABLET ORAL DAILY
Qty: 30 TABLET | Refills: 2 | Status: SHIPPED | OUTPATIENT
Start: 2023-01-24

## 2023-01-27 ENCOUNTER — SOCIAL WORK (OUTPATIENT)
Dept: BEHAVIORAL/MENTAL HEALTH CLINIC | Facility: CLINIC | Age: 38
End: 2023-01-27

## 2023-01-27 DIAGNOSIS — F32.A ANXIETY AND DEPRESSION: Primary | ICD-10-CM

## 2023-01-27 DIAGNOSIS — F41.9 ANXIETY AND DEPRESSION: Primary | ICD-10-CM

## 2023-01-27 NOTE — PSYCH
Behavioral Health Psychotherapy Progress Note    Psychotherapy Provided: Individual Psychotherapy     1  Anxiety and depression            Goals addressed in session: Goal 1     DATA: Ryan Ellis reports that her depression and anxiety have been better  She states that it gets worse when she is caught in her own head  She feels that when she is around her family and they all have their families and she does not  We discussed her dating and she feels like she has always been a place guzman until something better comes along  Every time she puts herself out there, it has bad outcomes  She states that in 2017 she started being friends with this one maria guadalupe and then it led to him being dishonest as he wanted to leave his current girlfriend who then wound up being pregnant  She felt he fed her a whole lot of what she wanted to hear  A year after they had the baby he broke off with the baby's mother and they talked and then hooked up  She then found out that he was seeing someone else  She described how she is having a hard time getting over him  She is in love with the maria guadalupe but knows that she deserves better  She felt like they had a good connection and had a lot of meaningful conversations  She struggled with acceptance that this is possibly not the relationship for her  She does not feel like she would ever be able to trust him if they were together  We processed what she is looking for in a relationship  She feels like she is not the girlfriend, she is the one they just want to sleep with  This gentleman was about 24 so significantly younger than her  She states that the dating world is rough out there  She feels like she has a good support system  PHQ-9=  14 indicative of moderate depression    ANUM-7= 6 indicative of mild anxiety          During this session, this clinician used the following therapeutic modalities: Cognitive Behavioral Therapy, Solution-Focused Therapy and Supportive Psychotherapy    Substance Abuse was not addressed during this session  If the client is diagnosed with a co-occurring substance use disorder, please indicate any changes in the frequency or amount of use: N/A  Stage of change for addressing substance use diagnoses: No substance use/Not applicable    ASSESSMENT:  Ghada Crisostomo presents with a Euthymic/ normal mood  Lonell Saltness appears to be wanting a relationship and she is compromising her own morals and ethics if this maria guadalupe  She does display insight into when she thinks logically she knows she needs to get rid of this maria guadalupe  her affect is Normal range and intensity, which is congruent, with her mood and the content of the session  The client has made progress on their goals  Ghada Crisostomo presents with a none risk of suicide, none risk of self-harm, and none risk of harm to others  For any risk assessment that surpasses a "low" rating, a safety plan must be developed  A safety plan was indicated: no  If yes, describe in detail N/A    PLAN: Between sessions, Ghada Crisostomo will identify her positive qualities    At the next session, the therapist will use Cognitive Behavioral Therapy and Solution-Focused Therapy to address her low self esteem       Behavioral Health Treatment Plan and Discharge Planning: Ghada Crisostomo is aware of and agrees to continue to work on their treatment plan  They have identified and are working toward their discharge goals   yes    Visit start and stop times:    01/27/23  Start Time: 4031  Stop Time: 1640  Total Visit Time: 45 minutes

## 2023-02-05 DIAGNOSIS — E06.3 HYPOTHYROIDISM DUE TO HASHIMOTO'S THYROIDITIS: ICD-10-CM

## 2023-02-05 DIAGNOSIS — E03.8 HYPOTHYROIDISM DUE TO HASHIMOTO'S THYROIDITIS: ICD-10-CM

## 2023-02-07 RX ORDER — LEVOTHYROXINE SODIUM 125 MCG
TABLET ORAL
Qty: 90 TABLET | Refills: 1 | Status: SHIPPED | OUTPATIENT
Start: 2023-02-07

## 2023-02-09 ENCOUNTER — OFFICE VISIT (OUTPATIENT)
Dept: URGENT CARE | Facility: CLINIC | Age: 38
End: 2023-02-09

## 2023-02-09 VITALS
OXYGEN SATURATION: 99 % | DIASTOLIC BLOOD PRESSURE: 64 MMHG | WEIGHT: 110.4 LBS | BODY MASS INDEX: 22.26 KG/M2 | TEMPERATURE: 97.5 F | HEIGHT: 59 IN | HEART RATE: 76 BPM | RESPIRATION RATE: 20 BRPM | SYSTOLIC BLOOD PRESSURE: 114 MMHG

## 2023-02-09 DIAGNOSIS — J06.9 UPPER RESPIRATORY TRACT INFECTION, UNSPECIFIED TYPE: Primary | ICD-10-CM

## 2023-02-09 DIAGNOSIS — R05.1 ACUTE COUGH: ICD-10-CM

## 2023-02-09 DIAGNOSIS — J02.9 SORE THROAT: ICD-10-CM

## 2023-02-09 LAB — S PYO AG THROAT QL: NEGATIVE

## 2023-02-09 NOTE — LETTER
[facility] NOW [address]  [address] [place] [address]  Dept: [phone]    February 9, 2023    Patient: Rosaura Andrew  YOB: 1985    Rosaura Andrew was seen and evaluated at our [facility]  Please note if Covid and Flu tests are negative, they may return to work when fever free for 24 hours without the use of a fever reducing agent  If Covid or Flu test is positive, they may return to work on 02/13/2023, as this is 5 days from the onset of symptoms  Upon return, they must then adhere to strict masking for an additional 5 days      Sincerely,    GABINO Fairchild

## 2023-02-09 NOTE — PATIENT INSTRUCTIONS
Strep negative in office  COVID/flu  test pending, will follow-up with results  Continue over-the-counter products for symptoms: tylenol for fevers, ibuprofen for body aches, flonase (fluticasone) for nasal congestion and airborne/emergen-c for vitamin supplementation  Follow-up with PCP in 3-5 days  Report to ER if symptoms worsen

## 2023-02-09 NOTE — PROGRESS NOTES
[facility]        NAME: Silver Montes is a 40 y o  female  : 1985    MRN: [id number]  DATE: 2023  TIME: 4:54 PM    Assessment and Plan   Upper respiratory tract infection, unspecified type [J06 9]  1  Upper respiratory tract infection, unspecified type        2  Sore throat  POCT rapid strepA      3  Acute cough  Covid/Flu-Office Collect            Patient Instructions     Strep negative in office  COVID/flu  test pending, will follow-up with results  Continue over-the-counter products for symptoms: tylenol for fevers, ibuprofen for body aches, flonase (fluticasone) for nasal congestion and airborne/emergen-c for vitamin supplementation  Follow-up with PCP in 3-5 days  Report to ER if symptoms worsen  Chief Complaint     Chief Complaint   Patient presents with   • Cold Like Symptoms     Stuffy nose, cough- productive yellow sputum    • Sore Throat     Since Monday          History of Present Illness       40year old female presents with cough, congestion, and sore throat since Monday  Tried Mucinex with some relief  Works as a teacher, 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  URI   This is a new problem  The current episode started in the past 7 days  The problem has been gradually improving  There has been no fever  Associated symptoms include congestion, coughing, headaches and a sore throat  Pertinent negatives include no abdominal pain, chest pain, diarrhea, dysuria, ear pain, joint pain, joint swelling, nausea, neck pain, plugged ear sensation, rash, rhinorrhea, sinus pain, sneezing, swollen glands, vomiting or wheezing  She has tried decongestant for the symptoms  The treatment provided no relief  Review of Systems   Review of Systems   Constitutional: Positive for fatigue  Negative for activity change, appetite change, chills and fever  HENT: Positive for congestion, postnasal drip, sinus pressure and sore throat   Negative for ear pain, rhinorrhea, sinus pain and sneezing  Respiratory: Positive for cough  Negative for chest tightness and wheezing  Cardiovascular: Negative for chest pain  Gastrointestinal: Negative for abdominal pain, diarrhea, nausea and vomiting  Genitourinary: Negative for difficulty urinating and dysuria  Musculoskeletal: Negative for arthralgias, joint pain, myalgias and neck pain  Skin: Negative for rash  Neurological: Positive for headaches  Negative for dizziness and weakness           Current Medications       Current Outpatient Medications:   •  ergocalciferol (VITAMIN D2) 50,000 units, Take 1 capsule (50,000 Units total) by mouth once a week, Disp: 12 capsule, Rfl: 0  •  escitalopram (LEXAPRO) 10 mg tablet, TAKE 1 TABLET (10 MG TOTAL) BY MOUTH DAILY, Disp: 30 tablet, Rfl: 2  •  Synthroid 125 MCG tablet, TAKE 1 TABLET BY MOUTH EVERY DAY, Disp: 90 tablet, Rfl: 1  •  naproxen (Naprosyn) 500 mg tablet, Take 1 tablet (500 mg total) by mouth 2 (two) times a day with meals As needed for lower abd pain (Patient not taking: Reported on 2/9/2023), Disp: 20 tablet, Rfl: 0  •  ondansetron (Zofran ODT) 4 mg disintegrating tablet, Take 1 tablet (4 mg total) by mouth every 6 (six) hours as needed for nausea or vomiting (Patient not taking: Reported on 2/9/2023), Disp: 20 tablet, Rfl: 0    Current Allergies     Allergies as of 02/09/2023 - Reviewed 02/09/2023   Allergen Reaction Noted   • Bee venom  04/12/2016   • Pollen extract  02/03/2020            The following portions of the patient's history were reviewed and updated as appropriate: allergies, current medications, past family history, past medical history, past social history, past surgical history and problem list      Past Medical History:   Diagnosis Date   • Anxiety 4/12/2016   • Cardiac disease     pulmonary stenosis   • Depression 9/3/2020   • Disease of thyroid gland     hypothyroidism   • Lumbar back pain with radiculopathy affecting left lower extremity 9/3/2020   • Lumbar back pain with radiculopathy affecting right lower extremity 9/3/2020   • [place] syndrome    • Pulmonary stenosis        Past Surgical History:   Procedure Laterality Date   • CARDIAC SURGERY      pulmonary stenosis       Family History   Problem Relation Age of Onset   • Thyroid disease Mother    • Diabetes Father          Medications have been verified  Objective   /64   Pulse 76   Temp 97 5 °F (36 4 °C)   Resp 20   Ht 4' 11" (1 499 m)   Wt 50 1 kg (110 lb 6 4 oz)   SpO2 99%   BMI 22 30 kg/m²        Physical Exam     Physical Exam  Vitals and nursing note reviewed  Constitutional:       General: She is awake  Appearance: Normal appearance  She is well-developed and normal weight  HENT:      Head: Normocephalic and atraumatic  Right Ear: Hearing, ear canal and external ear normal  No drainage, swelling or tenderness  A middle ear effusion is present  Tympanic membrane is not erythematous  Left Ear: Hearing, ear canal and external ear normal  No drainage, swelling or tenderness  A middle ear effusion is present  Tympanic membrane is not erythematous  Nose: Congestion present  No rhinorrhea  Right Turbinates: Enlarged  Not swollen or pale  Left Turbinates: Enlarged  Not swollen or pale  Right Sinus: No maxillary sinus tenderness or frontal sinus tenderness  Left Sinus: No maxillary sinus tenderness or frontal sinus tenderness  Mouth/Throat:      Mouth: Mucous membranes are moist       Pharynx: Uvula midline  Posterior oropharyngeal erythema present  Tonsils: No tonsillar exudate  Eyes:      Conjunctiva/sclera: Conjunctivae normal       Pupils: Pupils are equal, round, and reactive to light  Cardiovascular:      Rate and Rhythm: Normal rate and regular rhythm  Heart sounds: Normal heart sounds  Pulmonary:      Effort: Pulmonary effort is normal       Breath sounds: Normal breath sounds     Abdominal:      General: Bowel sounds are normal  Palpations: Abdomen is soft  Musculoskeletal:      Cervical back: Normal range of motion and neck supple  Lymphadenopathy:      Cervical: No cervical adenopathy  Skin:     General: Skin is warm and dry  Neurological:      Mental Status: She is alert and oriented to person, place, and time  Psychiatric:         Mood and Affect: Mood normal          Behavior: Behavior normal  Behavior is cooperative

## 2023-02-10 LAB
FLUAV RNA RESP QL NAA+PROBE: NEGATIVE
FLUBV RNA RESP QL NAA+PROBE: NEGATIVE
SARS-COV-2 RNA RESP QL NAA+PROBE: NEGATIVE

## 2023-02-23 ENCOUNTER — SOCIAL WORK (OUTPATIENT)
Dept: BEHAVIORAL/MENTAL HEALTH CLINIC | Facility: CLINIC | Age: 38
End: 2023-02-23

## 2023-02-23 DIAGNOSIS — F32.A ANXIETY AND DEPRESSION: Primary | ICD-10-CM

## 2023-02-23 DIAGNOSIS — F41.9 ANXIETY AND DEPRESSION: Primary | ICD-10-CM

## 2023-02-23 NOTE — PSYCH
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 and she wants to work with kids and feel like a teacher and not a   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, but she does not think that physically she could handle it  She like the steady paycheck as opposed to be commission based  She has been hanging out with friends and went out to dinner  Her father had Covid and she tested negative although she was sick as well  Her dad was not hospitalized although he had a stent put in his heart and has diabetes  She does not take her Lexapro daily and she is using it as a PRN as it causes her to have night sweats if she takes it everyday  Her dad pays for the house and utilities and she pays for groceries, her own personal bills and she keeps the house clean  She cannot afford to live on her own  She works long hours at [employer]  She does not have benefits at her job  She does not get paid if she does not work  Her job does not pay her overtime even though she works over time regularly  We processed how her job does not make her feel valued  During this session, this clinician used the following therapeutic modalities: Cognitive Behavioral Therapy and Supportive Psychotherapy    Substance Abuse was not addressed during this session  If the client is diagnosed with a co-occurring substance use disorder, please indicate any changes in the frequency or amount of use: N/A   Stage of change for addressing substance use diagnoses: No substance use/Not applicable    ASSESSMENT:  Emory Kim presents with a Euthymic/ normal mood  Ravindra Mccullough appears to be making some progress as evidenced by her seeking new employment  Her area of concern appears to be her inability to make a living wage and is something happens to her dad        her affect is Normal range and intensity, which is congruent, with her mood and the content of the session  The client has made progress on their goals  Emory Kim presents with a none risk of suicide, none risk of self-harm, and none risk of harm to others  For any risk assessment that surpasses a "low" rating, a safety plan must be developed  A safety plan was indicated: no  If yes, describe in detail N/A    PLAN: Between sessions, Emory Kim will make a pros and cons list of getting a new job At the next session, the therapist will use Cognitive Behavioral Therapy and Supportive Psychotherapy to address work on her self esteem issues       Behavioral Health Treatment Plan and Discharge Planning: Emory Kim is aware of and agrees to continue to work on their treatment plan  They have identified and are working toward their discharge goals   yes    Visit start and stop times:    02/23/23  Start Time: 1543  Stop Time: 1630  Total Visit Time: 47 minutes

## 2023-03-17 ENCOUNTER — OFFICE VISIT (OUTPATIENT)
Dept: FAMILY MEDICINE CLINIC | Facility: CLINIC | Age: 38
End: 2023-03-17

## 2023-03-17 ENCOUNTER — APPOINTMENT (OUTPATIENT)
Dept: LAB | Facility: CLINIC | Age: 38
End: 2023-03-17

## 2023-03-17 VITALS
SYSTOLIC BLOOD PRESSURE: 110 MMHG | HEART RATE: 60 BPM | OXYGEN SATURATION: 98 % | BODY MASS INDEX: 22.02 KG/M2 | WEIGHT: 109 LBS | DIASTOLIC BLOOD PRESSURE: 68 MMHG

## 2023-03-17 DIAGNOSIS — R11.0 NAUSEA: ICD-10-CM

## 2023-03-17 DIAGNOSIS — E06.3 HYPOTHYROIDISM DUE TO HASHIMOTO'S THYROIDITIS: ICD-10-CM

## 2023-03-17 DIAGNOSIS — E03.8 HYPOTHYROIDISM DUE TO HASHIMOTO'S THYROIDITIS: ICD-10-CM

## 2023-03-17 DIAGNOSIS — R19.8 ALTERNATING CONSTIPATION AND DIARRHEA: ICD-10-CM

## 2023-03-17 DIAGNOSIS — R19.8 ALTERNATING CONSTIPATION AND DIARRHEA: Primary | ICD-10-CM

## 2023-03-17 DIAGNOSIS — I37.0 NONRHEUMATIC PULMONARY VALVE STENOSIS: ICD-10-CM

## 2023-03-17 DIAGNOSIS — Q87.19 NOONAN'S SYNDROME: ICD-10-CM

## 2023-03-17 DIAGNOSIS — Q24.9 CONGENITAL HEART DISEASE: ICD-10-CM

## 2023-03-17 DIAGNOSIS — R10.9 ABDOMINAL CRAMPING: ICD-10-CM

## 2023-03-17 LAB
ALBUMIN SERPL BCP-MCNC: 3.9 G/DL (ref 3.5–5)
ALP SERPL-CCNC: 57 U/L (ref 46–116)
ALT SERPL W P-5'-P-CCNC: 14 U/L (ref 12–78)
ANION GAP SERPL CALCULATED.3IONS-SCNC: 4 MMOL/L (ref 4–13)
AST SERPL W P-5'-P-CCNC: 8 U/L (ref 5–45)
BASOPHILS # BLD AUTO: 0.06 THOUSANDS/ÂΜL (ref 0–0.1)
BASOPHILS NFR BLD AUTO: 1 % (ref 0–1)
BILIRUB SERPL-MCNC: 0.48 MG/DL (ref 0.2–1)
BUN SERPL-MCNC: 12 MG/DL (ref 5–25)
CALCIUM SERPL-MCNC: 8.7 MG/DL (ref 8.3–10.1)
CHLORIDE SERPL-SCNC: 109 MMOL/L (ref 96–108)
CO2 SERPL-SCNC: 25 MMOL/L (ref 21–32)
CREAT SERPL-MCNC: 0.58 MG/DL (ref 0.6–1.3)
EOSINOPHIL # BLD AUTO: 0.3 THOUSAND/ÂΜL (ref 0–0.61)
EOSINOPHIL NFR BLD AUTO: 4 % (ref 0–6)
ERYTHROCYTE [DISTWIDTH] IN BLOOD BY AUTOMATED COUNT: 17.8 % (ref 11.6–15.1)
GFR SERPL CREATININE-BSD FRML MDRD: 118 ML/MIN/1.73SQ M
GLUCOSE P FAST SERPL-MCNC: 89 MG/DL (ref 65–99)
HCT VFR BLD AUTO: 33.6 % (ref 34.8–46.1)
HGB BLD-MCNC: 9.9 G/DL (ref 11.5–15.4)
IMM GRANULOCYTES # BLD AUTO: 0.02 THOUSAND/UL (ref 0–0.2)
IMM GRANULOCYTES NFR BLD AUTO: 0 % (ref 0–2)
LYMPHOCYTES # BLD AUTO: 1.52 THOUSANDS/ÂΜL (ref 0.6–4.47)
LYMPHOCYTES NFR BLD AUTO: 20 % (ref 14–44)
MCH RBC QN AUTO: 22.2 PG (ref 26.8–34.3)
MCHC RBC AUTO-ENTMCNC: 29.5 G/DL (ref 31.4–37.4)
MCV RBC AUTO: 76 FL (ref 82–98)
MONOCYTES # BLD AUTO: 0.68 THOUSAND/ÂΜL (ref 0.17–1.22)
MONOCYTES NFR BLD AUTO: 9 % (ref 4–12)
NEUTROPHILS # BLD AUTO: 4.95 THOUSANDS/ÂΜL (ref 1.85–7.62)
NEUTS SEG NFR BLD AUTO: 66 % (ref 43–75)
NRBC BLD AUTO-RTO: 0 /100 WBCS
PLATELET # BLD AUTO: 271 THOUSANDS/UL (ref 149–390)
PMV BLD AUTO: 11.3 FL (ref 8.9–12.7)
POTASSIUM SERPL-SCNC: 3.9 MMOL/L (ref 3.5–5.3)
PROT SERPL-MCNC: 7.7 G/DL (ref 6.4–8.4)
RBC # BLD AUTO: 4.45 MILLION/UL (ref 3.81–5.12)
SODIUM SERPL-SCNC: 138 MMOL/L (ref 135–147)
T4 FREE SERPL-MCNC: 0.87 NG/DL (ref 0.76–1.46)
TSH SERPL DL<=0.05 MIU/L-ACNC: 14.1 UIU/ML (ref 0.45–4.5)
WBC # BLD AUTO: 7.53 THOUSAND/UL (ref 4.31–10.16)

## 2023-03-17 RX ORDER — LEVOTHYROXINE SODIUM 125 MCG
125 TABLET ORAL DAILY
Qty: 90 TABLET | Refills: 1 | Status: SHIPPED | OUTPATIENT
Start: 2023-03-17

## 2023-03-17 RX ORDER — DICYCLOMINE HCL 20 MG
20 TABLET ORAL EVERY 6 HOURS PRN
Qty: 30 TABLET | Refills: 0 | Status: SHIPPED | OUTPATIENT
Start: 2023-03-17

## 2023-03-17 RX ORDER — ONDANSETRON 4 MG/1
4 TABLET, FILM COATED ORAL EVERY 8 HOURS PRN
Qty: 20 TABLET | Refills: 0 | Status: SHIPPED | OUTPATIENT
Start: 2023-03-17

## 2023-03-17 NOTE — PROGRESS NOTES
Name: Brett Helm      : 1985      MRN: [id number]  Encounter Provider: Dory Motley PA-C  Encounter Date: 3/17/2023   Encounter department: [address] [place] [address]     1  Alternating constipation and diarrhea  -     Comprehensive metabolic panel; Future  -     CBC and differential; Future  -     TSH, 3rd generation with Free T4 reflex; Future    2  Hypothyroidism due to Hashimoto's thyroiditis  -     Synthroid 125 MCG tablet; Take 1 tablet (125 mcg total) by mouth daily  -     TSH, 3rd generation with Free T4 reflex; Future    3  Filemon's syndrome    4  Congenital heart disease    5  Nonrheumatic pulmonary valve stenosis    6  Nausea  -     ondansetron (ZOFRAN) 4 mg tablet; Take 1 tablet (4 mg total) by mouth every 8 (eight) hours as needed for nausea or vomiting    7  Abdominal cramping  -     dicyclomine (BENTYL) 20 mg tablet; Take 1 tablet (20 mg total) by mouth every 6 (six) hours as needed (abdominal cramping)  recommend daily use of miralax until having 1 soft BM daily for several weeks  Increase fluids, physical activity  Prn bentyl and zofran for sx control discussed  Update labs as above  If no improvement with normalization of BMs recommend GI        HCC:  Continues with cardiology regularly for hx of pulmonary stenosis in setting of filemon syndrome       Subjective     Pt with hx of Filemon syndrome, pulmonic stenosis (congenital), hypothyroid presents with some abdominal complaints  Notes intermittent nausea for the past 2 months  Not related to food or time of day  Does note a more stress/anxiety recently  No abdominal pain but notes bloating/cramping  She has significant chronic constipation and has about 1 BM today  She also notes loose/large stools at times  No blood in stool  She recently ran out of her synthroid and has not been taking for a few weeks  Denies pregnancy concerns       Review of Systems   Constitutional: Negative for chills, fatigue and fever    HENT: Negative for congestion, ear pain, hearing loss, nosebleeds, postnasal drip, rhinorrhea, sinus pressure, sinus pain, sneezing and sore throat  Eyes: Negative for pain, discharge, itching and visual disturbance  Respiratory: Negative for cough, chest tightness, shortness of breath and wheezing  Cardiovascular: Negative for chest pain, palpitations and leg swelling  Gastrointestinal: Positive for constipation and nausea  Negative for abdominal pain, blood in stool, diarrhea and vomiting  Genitourinary: Negative for frequency and urgency  Neurological: Negative for dizziness, light-headedness and numbness         Past Medical History:   Diagnosis Date   • Anxiety 4/12/2016   • Cardiac disease     pulmonary stenosis   • Depression 9/3/2020   • Disease of thyroid gland     hypothyroidism   • Lumbar back pain with radiculopathy affecting left lower extremity 9/3/2020   • Lumbar back pain with radiculopathy affecting right lower extremity 9/3/2020   • [place] syndrome    • Pulmonary stenosis      Past Surgical History:   Procedure Laterality Date   • CARDIAC SURGERY      pulmonary stenosis     Family History   Problem Relation Age of Onset   • Thyroid disease Mother    • Diabetes Father      Social History     Socioeconomic History   • Marital status: Single     Spouse name: None   • Number of children: None   • Years of education: None   • Highest education level: None   Occupational History   • None   Tobacco Use   • Smoking status: Never   • Smokeless tobacco: Never   Vaping Use   • Vaping Use: Never used   Substance and Sexual Activity   • Alcohol use: Never     Comment: social   • Drug use: Never   • Sexual activity: Not Currently     Partners: Male     Birth control/protection: None   Other Topics Concern   • None   Social History Narrative   • None     Social Determinants of Health     Financial Resource Strain: Not on file   Food Insecurity: Not on file   Transportation Needs: Not on file Physical Activity: Not on file   Stress: Not on file   Social Connections: Not on file   Intimate Partner Violence: Not on file   Housing Stability: Not on file     Current Outpatient Medications on File Prior to Visit   Medication Sig   • ergocalciferol (VITAMIN D2) 50,000 units Take 1 capsule (50,000 Units total) by mouth once a week   • escitalopram (LEXAPRO) 10 mg tablet TAKE 1 TABLET (10 MG TOTAL) BY MOUTH DAILY   • naproxen (Naprosyn) 500 mg tablet Take 1 tablet (500 mg total) by mouth 2 (two) times a day with meals As needed for lower abd pain (Patient not taking: Reported on 2/9/2023)   • [DISCONTINUED] ondansetron (Zofran ODT) 4 mg disintegrating tablet Take 1 tablet (4 mg total) by mouth every 6 (six) hours as needed for nausea or vomiting (Patient not taking: Reported on 2/9/2023)   • [DISCONTINUED] Synthroid 125 MCG tablet TAKE 1 TABLET BY MOUTH EVERY DAY     Allergies   Allergen Reactions   • Bee Venom    • Pollen Extract      Immunization History   Administered Date(s) Administered   • Influenza, injectable, quadrivalent, preservative free 0 5 mL 10/02/2020, 10/12/2021   • Tdap 01/27/2017   • Tuberculin Skin Test-PPD Intradermal 10/02/2020       Objective     /68   Pulse 60   Wt 49 4 kg (109 lb)   SpO2 98%   BMI 22 02 kg/m²     Physical Exam  Vitals and nursing note reviewed  Constitutional:       General: She is not in acute distress  Appearance: Normal appearance  HENT:      Head: Normocephalic and atraumatic  Nose: Nose normal       Mouth/Throat:      Mouth: Mucous membranes are moist       Pharynx: Oropharynx is clear  No oropharyngeal exudate or posterior oropharyngeal erythema  Eyes:      Pupils: Pupils are equal, round, and reactive to light  Cardiovascular:      Rate and Rhythm: Normal rate and regular rhythm  Heart sounds: Normal heart sounds  No murmur heard  Pulmonary:      Effort: Pulmonary effort is normal  No respiratory distress        Breath sounds: Normal breath sounds  No wheezing, rhonchi or rales  Abdominal:      General: Bowel sounds are normal  There is no distension  Palpations: Abdomen is soft  Tenderness: There is no abdominal tenderness  There is no guarding  Musculoskeletal:         General: Normal range of motion  Cervical back: Normal range of motion and neck supple  Skin:     General: Skin is warm and dry  Neurological:      Mental Status: She is alert and oriented to person, place, and time     Psychiatric:         Mood and Affect: Mood and affect normal        Swapnil Henry PA-C

## 2023-03-20 DIAGNOSIS — D64.9 ANEMIA, UNSPECIFIED TYPE: Primary | ICD-10-CM

## 2023-03-20 DIAGNOSIS — E55.9 VITAMIN D DEFICIENCY: ICD-10-CM

## 2023-03-20 DIAGNOSIS — E06.3 HYPOTHYROIDISM DUE TO HASHIMOTO'S THYROIDITIS: ICD-10-CM

## 2023-03-20 DIAGNOSIS — E03.8 HYPOTHYROIDISM DUE TO HASHIMOTO'S THYROIDITIS: ICD-10-CM

## 2023-03-20 RX ORDER — ERGOCALCIFEROL 1.25 MG/1
CAPSULE ORAL
Qty: 12 CAPSULE | Refills: 0 | Status: SHIPPED | OUTPATIENT
Start: 2023-03-20

## 2023-03-22 DIAGNOSIS — E03.8 HYPOTHYROIDISM DUE TO HASHIMOTO'S THYROIDITIS: Primary | ICD-10-CM

## 2023-03-22 DIAGNOSIS — E06.3 HYPOTHYROIDISM DUE TO HASHIMOTO'S THYROIDITIS: Primary | ICD-10-CM

## 2023-03-23 ENCOUNTER — APPOINTMENT (OUTPATIENT)
Dept: LAB | Facility: CLINIC | Age: 38
End: 2023-03-23

## 2023-03-23 ENCOUNTER — SOCIAL WORK (OUTPATIENT)
Dept: BEHAVIORAL/MENTAL HEALTH CLINIC | Facility: CLINIC | Age: 38
End: 2023-03-23

## 2023-03-23 DIAGNOSIS — E06.3 HYPOTHYROIDISM DUE TO HASHIMOTO'S THYROIDITIS: ICD-10-CM

## 2023-03-23 DIAGNOSIS — F32.A ANXIETY AND DEPRESSION: Primary | ICD-10-CM

## 2023-03-23 DIAGNOSIS — E03.8 HYPOTHYROIDISM DUE TO HASHIMOTO'S THYROIDITIS: ICD-10-CM

## 2023-03-23 DIAGNOSIS — D64.9 ANEMIA, UNSPECIFIED TYPE: ICD-10-CM

## 2023-03-23 DIAGNOSIS — F41.9 ANXIETY AND DEPRESSION: Primary | ICD-10-CM

## 2023-03-23 LAB
FERRITIN SERPL-MCNC: 3 NG/ML (ref 8–388)
IRON SATN MFR SERPL: 10 % (ref 15–50)
IRON SERPL-MCNC: 41 UG/DL (ref 50–170)
T4 FREE SERPL-MCNC: 0.85 NG/DL (ref 0.76–1.46)
TIBC SERPL-MCNC: 399 UG/DL (ref 250–450)
TSH SERPL DL<=0.05 MIU/L-ACNC: 29.5 UIU/ML (ref 0.45–4.5)

## 2023-03-23 NOTE — PSYCH
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  She will be making 50 cents more an hour  She is feeling hopeful about this job  Her dad is doing well and he supports her decisions as long as she is paying her bills  She states that her mom was just here visiting and she comes 2-3 times a year  She goes to visit her for a week in the summer time  She lives in [place]  Her mother stays with her sister  She does have a good relationship with most of her family  Her mom and her dad only speak when they have to  She does not remember her parents being together but she remembers the day her mother left  She never got along with her mother when she was younger  She described her sibling relationships in depth, her sister tries to be her mother but she is close with her brother  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 and it 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  She does see her friend at least twice a week  She hangs out with this person at their house  She eventually wants to move away and start over  She feels obligated to be here at this time for her father  She worries about her father and part of her role in the house is to clean and cook  She is appropriately dressed and well groomed  Her thought process is logical and speech is normal rate, volume and content  She minimizes the trauma of her childhood surgeries  She had 3 heart surgeries one being open heart  She had open heart when she was 10 and we processed her parents not being together at the time  During this session, this clinician used the following therapeutic modalities: Cognitive Behavioral Therapy    Substance Abuse was not addressed during this session   If the client is diagnosed with a co-occurring substance use disorder, please indicate any changes in the frequency or amount of use: N/A  Stage of change for addressing substance use diagnoses: No substance use/Not applicable    ASSESSMENT:  Shannan Silva presents with a Euthymic/ normal mood  her affect is Normal range and intensity, which is congruent, with her mood and the content of the session  The client has made progress on their goals  Shannan Silva presents with a none risk of suicide, none risk of self-harm, and none risk of harm to others  For any risk assessment that surpasses a "low" rating, a safety plan must be developed  A safety plan was indicated: no  If yes, describe in detail N/A    PLAN: Between sessions, Shannan Silva will work on processing her trauma as opposed to not  At the next session, the therapist will use Cognitive Behavioral Therapy to address the minimizing of her trauma       Behavioral Health Treatment Plan and Discharge Planning: Shannan Silva is aware of and agrees to continue to work on their treatment plan  They have identified and are working toward their discharge goals   yes    Visit start and stop times:    03/23/23  Start Time: 1358  Stop Time: 1446  Total Visit Time: 48 minutes

## 2023-03-27 DIAGNOSIS — D50.9 IRON DEFICIENCY ANEMIA, UNSPECIFIED IRON DEFICIENCY ANEMIA TYPE: Primary | ICD-10-CM

## 2023-03-27 DIAGNOSIS — E03.8 HYPOTHYROIDISM DUE TO HASHIMOTO'S THYROIDITIS: ICD-10-CM

## 2023-03-27 DIAGNOSIS — E06.3 HYPOTHYROIDISM DUE TO HASHIMOTO'S THYROIDITIS: ICD-10-CM

## 2023-03-27 RX ORDER — DOCUSATE SODIUM 100 MG/1
100 CAPSULE, LIQUID FILLED ORAL 2 TIMES DAILY PRN
Qty: 60 CAPSULE | Refills: 0 | Status: SHIPPED | OUTPATIENT
Start: 2023-03-27

## 2023-03-27 RX ORDER — FERROUS SULFATE TAB EC 324 MG (65 MG FE EQUIVALENT) 324 (65 FE) MG
324 TABLET DELAYED RESPONSE ORAL
Qty: 180 TABLET | Refills: 0 | Status: SHIPPED | OUTPATIENT
Start: 2023-03-27 | End: 2023-06-25

## 2023-06-02 DIAGNOSIS — F32.A ANXIETY AND DEPRESSION: ICD-10-CM

## 2023-06-02 DIAGNOSIS — F41.9 ANXIETY AND DEPRESSION: ICD-10-CM

## 2023-06-02 RX ORDER — ESCITALOPRAM OXALATE 10 MG/1
10 TABLET ORAL DAILY
Qty: 30 TABLET | Refills: 2 | Status: SHIPPED | OUTPATIENT
Start: 2023-06-02

## 2023-06-29 ENCOUNTER — APPOINTMENT (OUTPATIENT)
Dept: LAB | Facility: CLINIC | Age: 38
End: 2023-06-29
Payer: COMMERCIAL

## 2023-06-29 DIAGNOSIS — E06.3 HYPOTHYROIDISM DUE TO HASHIMOTO'S THYROIDITIS: ICD-10-CM

## 2023-06-29 DIAGNOSIS — E03.8 HYPOTHYROIDISM DUE TO HASHIMOTO'S THYROIDITIS: ICD-10-CM

## 2023-06-29 DIAGNOSIS — D50.9 IRON DEFICIENCY ANEMIA, UNSPECIFIED IRON DEFICIENCY ANEMIA TYPE: ICD-10-CM

## 2023-06-29 LAB
BASOPHILS # BLD AUTO: 0.06 THOUSANDS/ÂΜL (ref 0–0.1)
BASOPHILS NFR BLD AUTO: 1 % (ref 0–1)
EOSINOPHIL # BLD AUTO: 0.29 THOUSAND/ÂΜL (ref 0–0.61)
EOSINOPHIL NFR BLD AUTO: 6 % (ref 0–6)
ERYTHROCYTE [DISTWIDTH] IN BLOOD BY AUTOMATED COUNT: 16.7 % (ref 11.6–15.1)
FERRITIN SERPL-MCNC: 3 NG/ML (ref 11–307)
HCT VFR BLD AUTO: 36.2 % (ref 34.8–46.1)
HGB BLD-MCNC: 11.2 G/DL (ref 11.5–15.4)
IMM GRANULOCYTES # BLD AUTO: 0.01 THOUSAND/UL (ref 0–0.2)
IMM GRANULOCYTES NFR BLD AUTO: 0 % (ref 0–2)
IRON SATN MFR SERPL: 10 % (ref 15–50)
IRON SERPL-MCNC: 40 UG/DL (ref 50–170)
LYMPHOCYTES # BLD AUTO: 1.24 THOUSANDS/ÂΜL (ref 0.6–4.47)
LYMPHOCYTES NFR BLD AUTO: 25 % (ref 14–44)
MCH RBC QN AUTO: 23.9 PG (ref 26.8–34.3)
MCHC RBC AUTO-ENTMCNC: 30.9 G/DL (ref 31.4–37.4)
MCV RBC AUTO: 77 FL (ref 82–98)
MONOCYTES # BLD AUTO: 0.57 THOUSAND/ÂΜL (ref 0.17–1.22)
MONOCYTES NFR BLD AUTO: 12 % (ref 4–12)
NEUTROPHILS # BLD AUTO: 2.76 THOUSANDS/ÂΜL (ref 1.85–7.62)
NEUTS SEG NFR BLD AUTO: 56 % (ref 43–75)
NRBC BLD AUTO-RTO: 0 /100 WBCS
PLATELET # BLD AUTO: 239 THOUSANDS/UL (ref 149–390)
PMV BLD AUTO: 11.5 FL (ref 8.9–12.7)
RBC # BLD AUTO: 4.69 MILLION/UL (ref 3.81–5.12)
T4 FREE SERPL-MCNC: 1.33 NG/DL (ref 0.61–1.12)
TIBC SERPL-MCNC: 404 UG/DL (ref 250–450)
TSH SERPL DL<=0.05 MIU/L-ACNC: 6.79 UIU/ML (ref 0.45–4.5)
WBC # BLD AUTO: 4.93 THOUSAND/UL (ref 4.31–10.16)

## 2023-06-29 PROCEDURE — 82728 ASSAY OF FERRITIN: CPT

## 2023-06-29 PROCEDURE — 85025 COMPLETE CBC W/AUTO DIFF WBC: CPT

## 2023-06-29 PROCEDURE — 84443 ASSAY THYROID STIM HORMONE: CPT

## 2023-06-29 PROCEDURE — 84439 ASSAY OF FREE THYROXINE: CPT

## 2023-06-29 PROCEDURE — 83550 IRON BINDING TEST: CPT

## 2023-06-29 PROCEDURE — 36415 COLL VENOUS BLD VENIPUNCTURE: CPT

## 2023-06-29 PROCEDURE — 83540 ASSAY OF IRON: CPT

## 2023-06-30 DIAGNOSIS — R10.9 ABDOMINAL CRAMPING: Primary | ICD-10-CM

## 2023-06-30 DIAGNOSIS — R19.7 DIARRHEA, UNSPECIFIED TYPE: ICD-10-CM

## 2023-06-30 DIAGNOSIS — E03.8 HYPOTHYROIDISM DUE TO HASHIMOTO'S THYROIDITIS: Primary | ICD-10-CM

## 2023-06-30 DIAGNOSIS — E06.3 HYPOTHYROIDISM DUE TO HASHIMOTO'S THYROIDITIS: Primary | ICD-10-CM

## 2023-07-06 DIAGNOSIS — E06.3 HYPOTHYROIDISM DUE TO HASHIMOTO'S THYROIDITIS: ICD-10-CM

## 2023-07-06 DIAGNOSIS — E03.8 HYPOTHYROIDISM DUE TO HASHIMOTO'S THYROIDITIS: ICD-10-CM

## 2023-07-06 RX ORDER — LEVOTHYROXINE SODIUM 125 MCG
125 TABLET ORAL DAILY
Qty: 60 TABLET | Refills: 0 | Status: SHIPPED | OUTPATIENT
Start: 2023-07-06

## 2023-07-06 RX ORDER — LEVOTHYROXINE SODIUM 125 MCG
125 TABLET ORAL DAILY
Qty: 60 TABLET | Refills: 0 | OUTPATIENT
Start: 2023-07-06

## 2023-07-07 DIAGNOSIS — D50.9 IRON DEFICIENCY ANEMIA, UNSPECIFIED IRON DEFICIENCY ANEMIA TYPE: Primary | ICD-10-CM

## 2023-07-10 ENCOUNTER — TELEPHONE (OUTPATIENT)
Dept: HEMATOLOGY ONCOLOGY | Facility: CLINIC | Age: 38
End: 2023-07-10

## 2023-07-10 NOTE — TELEPHONE ENCOUNTER
I called Tana Frost in response to a referral that was received for patient to establish care with Hematology. Outreach was made to schedule a consultation. .    I left a voicemail explaining the reason for my call and advised patient to call [facility] at [phone]. Another attempt will be made to contact patient.

## 2023-07-10 NOTE — PROGRESS NOTES
HEMATOLOGY CLINIC NOTE    Primary Care Provider: Nima Ch PA-C  Referring Provider: Richar Sadler  MRN: [id number]  : 1985    Assessment / Plan:     Iron deficiency anemia secondary to heavy menstrual periods   Start IV Venofer 200 mg/week x 3; we will make an appointment for the patient to come back in 1 months to reassess  The patient to stop taking oral supplementation of iron        · Discussion of decision making    I personally reviewed the following lab results, the image studies, pathology, other specialty/physicians consult notes and recommendations, and outside medical records from [facility]. I had a lengthy discussion with the patient and shared the work-up findings. I spent 35 minutes reviewing the records (labs, clinician notes, outside records, medical history, ordering medicine/tests/procedures, interpreting the imaging/labs previously done) and coordination of care as well as direct time with the patient today, of which greater than 50% of the time was spent in counseling and coordination of care with the patient          Follow Up:4 weeks     All questions were answered to the patient's satisfaction during this encounter. The patient knows the contact information for our office and knows to reach out for any relevant concerns related to this encounter. They are to call for any temperature 100.4 or higher, new symptoms including but not restricted to shaking chills, decreased appetite, nausea, vomiting, diarrhea, increased fatigue, shortness of breath or chest pain, confusion, and not feeling the strength to come to the clinic. For all other listed problems and medical diagnosis in their chart - they are managed by PCP and/or other specialists, which the patient acknowledges. Thank you very much for your consultation and making us a part of this patient's care. We are continuing to follow closely with you.  Please do not hesitate to reach out to me with any additional questions or concerns. Reason for visit:     Iron deficiency anemia     History of Hematology Illness:     Jimmy Pena is a 40 y.o. female who came in for follow up/consultation patient has a longstanding history of heavy menstrual periods and iron deficiency anemia currently on oral iron supplements she does not tolerate them well she has constipation nausea and would like to switch to the IV therapy    Interval History:   07/11/23: Patient has a long history of heavy menstrual periods 6 to 7 days she changes many pads she reports occasional weakness however her lifestyle she works in the with childcare and most of the times sits through the day so she does not require any  excessive physical activity  Does not report chest pain palpitations or dizziness    Problem list:       Patient Active Problem List   Diagnosis   • Hypothyroidism   • Encounter for gynecological examination (general) (routine) without abnormal findings   • [place] syndrome   • Nonrheumatic pulmonary valve stenosis   • Congenital heart disease   • Anxiety and depression   • Droopy eyelid, left   • Vitamin D deficiency   • Iron deficiency anemia       REVIEW OF SYMPTOMS:   Review of Systems   Constitutional: Negative for chills and fever. HENT: Negative for ear pain and sore throat. Eyes: Negative for pain and visual disturbance. Respiratory: Negative for cough and shortness of breath. Cardiovascular: Negative for chest pain and palpitations. Gastrointestinal: Negative for abdominal pain and vomiting. Genitourinary: Negative for dysuria and hematuria. Musculoskeletal: Negative for arthralgias and back pain. Skin: Negative for color change and rash. Neurological: Negative for seizures and syncope. All other systems reviewed and are negative.       PHYSICAL EXAMINATION:     Vital Signs:   /60   Pulse 66   Temp 97.9 °F (36.6 °C) (Temporal)   Resp 16   Ht 4' 11" (1.499 m)   Wt 50.8 kg (112 lb)   SpO2 100% BMI 22.62 kg/m²   Body surface area is 1.44 meters squared. 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       PAST MEDICAL HISTORY:    Past Medical History:   Diagnosis Date   • Anxiety 4/12/2016   • Cardiac disease     pulmonary stenosis   • Depression 9/3/2020   • Disease of thyroid gland     hypothyroidism   • Lumbar back pain with radiculopathy affecting left lower extremity 9/3/2020   • Lumbar back pain with radiculopathy affecting right lower extremity 9/3/2020   • [place] syndrome    • Pulmonary stenosis        PAST SURGICAL HISTORY:    Past Surgical History:   Procedure Laterality Date   • CARDIAC SURGERY      pulmonary stenosis         CURRENT MEDICATIONS:     Current Outpatient Medications:   •  ergocalciferol (VITAMIN D2) 50,000 units, TAKE 1 CAPSULE BY MOUTH ONE TIME PER WEEK (Patient not taking: Reported on 7/11/2023), Disp: 12 capsule, Rfl: 0  •  Synthroid 125 MCG tablet, Take 1 tablet (125 mcg total) by mouth daily, Disp: 60 tablet, Rfl: 0  •  dicyclomine (BENTYL) 20 mg tablet, Take 1 tablet (20 mg total) by mouth every 6 (six) hours as needed (abdominal cramping) (Patient not taking: Reported on 7/11/2023), Disp: 30 tablet, Rfl: 0  •  docusate sodium (COLACE) 100 mg capsule, Take 1 capsule (100 mg total) by mouth 2 (two) times a day as needed for constipation (Patient not taking: Reported on 7/11/2023), Disp: 60 capsule, Rfl: 0  •  escitalopram (LEXAPRO) 10 mg tablet, TAKE 1 TABLET (10 MG TOTAL) BY MOUTH DAILY (Patient not taking: Reported on 7/11/2023), Disp: 30 tablet, Rfl: 2  •  ferrous sulfate 324 (65 Fe) mg, Take 1 tablet (324 mg total) by mouth 2 (two) times a day before meals, Disp: 180 tablet, Rfl: 0  •  naproxen (Naprosyn) 500 mg tablet, Take 1 tablet (500 mg total) by mouth 2 (two) times a day with meals As needed for lower abd pain (Patient not taking: Reported on 2/9/2023), Disp: 20 tablet, Rfl: 0  •  ondansetron (ZOFRAN) 4 mg tablet, Take 1 tablet (4 mg total) by mouth every 8 (eight) hours as needed for nausea or vomiting (Patient not taking: Reported on 7/11/2023), Disp: 20 tablet, Rfl: 0    Tobacco Use   • Sm  Social History     Tobacco Use   • Smoking status: Never   • Smokeless tobacco: Never   Vaping Use   • Vaping Use: Never used   Substance Use Topics   • Alcohol use: Never     Comment: social   • Drug use: Never   oking status: Never   • Smokeless tobacco: Never   Vaping Use   • Vaping Use: Never used   Substance Use Topics   • Alcohol use: Never     Comment: social   • Drug use: Never      Problem Relation Age of Onset   • Thyroid disease Mother    • Diabetes Father        ALLERGIES:    Allergies   Allergen Reactions   • Bee Venom    • Pollen Extract          LAB:    Lab Results   Component Value Date    WBC 4.93 06/29/2023    HGB 11.2 (L) 06/29/2023    HCT 36.2 06/29/2023    MCV 77 (L) 06/29/2023     06/29/2023       Lab Results   Component Value Date    SODIUM 138 03/17/2023    K 3.9 03/17/2023     (H) 03/17/2023    CO2 25 03/17/2023    AGAP 4 03/17/2023    BUN 12 03/17/2023    CREATININE 0.58 (L) 03/17/2023    GLUC 98 06/09/2022    GLUF 89 03/17/2023    CALCIUM 8.7 03/17/2023    AST 8 03/17/2023    ALT 14 03/17/2023    ALKPHOS 57 03/17/2023    TP 7.7 03/17/2023    TBILI 0.48 03/17/2023    EGFR 118 03/17/2023       IMAGING:

## 2023-07-11 ENCOUNTER — CONSULT (OUTPATIENT)
Dept: HEMATOLOGY ONCOLOGY | Facility: CLINIC | Age: 38
End: 2023-07-11
Payer: COMMERCIAL

## 2023-07-11 VITALS
RESPIRATION RATE: 16 BRPM | HEIGHT: 59 IN | WEIGHT: 112 LBS | DIASTOLIC BLOOD PRESSURE: 60 MMHG | TEMPERATURE: 97.9 F | SYSTOLIC BLOOD PRESSURE: 102 MMHG | HEART RATE: 66 BPM | OXYGEN SATURATION: 100 % | BODY MASS INDEX: 22.58 KG/M2

## 2023-07-11 DIAGNOSIS — D50.9 IRON DEFICIENCY ANEMIA, UNSPECIFIED IRON DEFICIENCY ANEMIA TYPE: Primary | ICD-10-CM

## 2023-07-11 PROCEDURE — 99204 OFFICE O/P NEW MOD 45 MIN: CPT | Performed by: INTERNAL MEDICINE

## 2023-07-11 RX ORDER — SODIUM CHLORIDE 9 MG/ML
20 INJECTION, SOLUTION INTRAVENOUS ONCE
OUTPATIENT
Start: 2023-07-19

## 2023-07-24 ENCOUNTER — TELEPHONE (OUTPATIENT)
Dept: HEMATOLOGY ONCOLOGY | Facility: CLINIC | Age: 38
End: 2023-07-24

## 2023-07-24 NOTE — TELEPHONE ENCOUNTER
Patient Call    Who are you speaking with? self   If it is not the patient, are they listed on an active communication consent form? self   What is the reason for this call? Need to schedule treatment plan   Does this require a call back? yes   If a call back is required, please list best call back number [phone]   If a call back is required, advise that a message will be forwarded to their care team and someone will return their call as soon as possible. Did you relay this information to the patient?  yes

## 2023-07-24 NOTE — TELEPHONE ENCOUNTER
Returned phone call to patient and let her know that I will forward to infusion schedulers to call her and get her set up for her venofer infusions. She verbalized understanding and has no other questions or concerns at this moment. I let her know we will move her appointment with Dr. Zoila Garcia as well, that way she gets all her infusion appointments in first before seeing him.

## 2023-07-28 ENCOUNTER — TELEPHONE (OUTPATIENT)
Dept: INFUSION CENTER | Facility: CLINIC | Age: 38
End: 2023-07-28

## 2023-07-28 DIAGNOSIS — D50.9 IRON DEFICIENCY ANEMIA, UNSPECIFIED IRON DEFICIENCY ANEMIA TYPE: Primary | ICD-10-CM

## 2023-07-28 RX ORDER — SODIUM CHLORIDE 9 MG/ML
20 INJECTION, SOLUTION INTRAVENOUS ONCE
Status: CANCELLED | OUTPATIENT
Start: 2023-08-01

## 2023-07-28 NOTE — TELEPHONE ENCOUNTER
New patient phone call. Informed patient of date and time of appointment. Reviewed policies and procedures of the infusion center. All questions answered at this time.

## 2023-08-01 ENCOUNTER — HOSPITAL ENCOUNTER (OUTPATIENT)
Dept: INFUSION CENTER | Facility: CLINIC | Age: 38
Discharge: HOME | End: 2023-08-01
Payer: COMMERCIAL

## 2023-08-01 VITALS
SYSTOLIC BLOOD PRESSURE: 101 MMHG | TEMPERATURE: 99.4 F | RESPIRATION RATE: 16 BRPM | DIASTOLIC BLOOD PRESSURE: 56 MMHG | HEART RATE: 65 BPM

## 2023-08-01 DIAGNOSIS — D50.9 IRON DEFICIENCY ANEMIA, UNSPECIFIED IRON DEFICIENCY ANEMIA TYPE: Primary | ICD-10-CM

## 2023-08-01 PROCEDURE — 96365 THER/PROPH/DIAG IV INF INIT: CPT

## 2023-08-01 RX ORDER — SODIUM CHLORIDE 9 MG/ML
20 INJECTION, SOLUTION INTRAVENOUS ONCE
Status: COMPLETED | OUTPATIENT
Start: 2023-08-01 | End: 2023-08-01

## 2023-08-01 RX ORDER — SODIUM CHLORIDE 9 MG/ML
20 INJECTION, SOLUTION INTRAVENOUS ONCE
Status: CANCELLED | OUTPATIENT
Start: 2023-08-08

## 2023-08-01 RX ADMIN — IRON SUCROSE 200 MG: 20 INJECTION, SOLUTION INTRAVENOUS at 08:47

## 2023-08-01 RX ADMIN — SODIUM CHLORIDE 20 ML/HR: 9 INJECTION, SOLUTION INTRAVENOUS at 08:45

## 2023-08-01 NOTE — PROGRESS NOTES
Pt presents for venofer infusion offering no complaints. Pt tolerated treatment without incident. AVS declined, next appointment reviewed.

## 2023-08-07 ENCOUNTER — OFFICE VISIT (OUTPATIENT)
Dept: GASTROENTEROLOGY | Facility: CLINIC | Age: 38
End: 2023-08-07
Payer: COMMERCIAL

## 2023-08-07 VITALS
SYSTOLIC BLOOD PRESSURE: 116 MMHG | DIASTOLIC BLOOD PRESSURE: 62 MMHG | OXYGEN SATURATION: 99 % | BODY MASS INDEX: 22.26 KG/M2 | HEART RATE: 74 BPM | HEIGHT: 59 IN | WEIGHT: 110.4 LBS

## 2023-08-07 DIAGNOSIS — D50.9 IRON DEFICIENCY ANEMIA, UNSPECIFIED IRON DEFICIENCY ANEMIA TYPE: ICD-10-CM

## 2023-08-07 DIAGNOSIS — K62.5 RECTAL BLEEDING: ICD-10-CM

## 2023-08-07 DIAGNOSIS — R10.9 ABDOMINAL PAIN, UNSPECIFIED ABDOMINAL LOCATION: Primary | ICD-10-CM

## 2023-08-07 DIAGNOSIS — R19.7 DIARRHEA, UNSPECIFIED TYPE: ICD-10-CM

## 2023-08-07 DIAGNOSIS — R14.0 BLOATING: ICD-10-CM

## 2023-08-07 DIAGNOSIS — K59.00 CONSTIPATION, UNSPECIFIED CONSTIPATION TYPE: ICD-10-CM

## 2023-08-07 DIAGNOSIS — R10.9 ABDOMINAL CRAMPING: ICD-10-CM

## 2023-08-07 PROCEDURE — 99203 OFFICE O/P NEW LOW 30 MIN: CPT | Performed by: INTERNAL MEDICINE

## 2023-08-07 RX ORDER — POLYETHYLENE GLYCOL 3350 17 G/17G
POWDER, FOR SOLUTION ORAL
Qty: 255 G | Refills: 0 | Status: SHIPPED | OUTPATIENT
Start: 2023-08-07

## 2023-08-07 RX ORDER — POLYETHYLENE GLYCOL 3350 17 G/17G
17 POWDER, FOR SOLUTION ORAL DAILY
Qty: 60 EACH | Refills: 2 | Status: SHIPPED | OUTPATIENT
Start: 2023-08-07

## 2023-08-07 NOTE — PATIENT INSTRUCTIONS
Scheduled date of EGD/colonoscopy (as of today):9/29/23  Physician performing EGD/colonoscopy:Iveth  Location of EGD/colonoscopy:[facility]  Desired bowel prep reviewed with patient:Elin/Miralax  Instructions reviewed with patient by:Enrico hua  Clearances:   none

## 2023-08-07 NOTE — PROGRESS NOTES
[facility] - Outpatient Note  Yanet Mathis 40 y.o. female MRN: [id number]  Encounter: [id number]      ASSESSMENT AND PLAN:    Yanet Mathis is a 40 y.o. old pleasant female with PMH of history of hypothyroidism, [place] syndrome, iron deficiency anemia, anxiety depression, history of hemorrhoid surgery who presents for consultation for constipation, abdominal pain, bloating and rectal bleeding    Constipation (with overflow diarrhea), abdominal pain, bloating-suspect constipation is the main  of her symptoms however given the multitude of symptoms and the fact that is affecting her quality of life we will perform endoscopic evaluation. · Counseled on fluid, fiber, ambulation  · Counseled on stepping stool  · We will give MiraLAX prep x1 and then start MiraLAX twice daily  · Plan for EGD for further evaluation. We will biopsy for H. pylori and celiac disease. · Consider low FODMAP diet in the future  · Consider peppermint oil  · Consider Linzess    Rectal bleeding-suspect hemorrhoidal bleeding given severe constipation however given the frequency of her rectal bleeding will need further work-up  · Plan for colonoscopy with biopsies for microscopic colitis and evaluation for hematochezia    Iron deficiency anemia-suspect secondary to severe menorrhagia however will rule out with endoscopic evaluation  · EGD/colonoscopy as above        1. Abdominal cramping  - Ambulatory Referral to Gastroenterology  - EGD; Future    2. Diarrhea, unspecified type  - Ambulatory Referral to Gastroenterology  - EGD; Future    3. Abdominal pain, unspecified abdominal location  - EGD; Future    4. Rectal bleeding  - Colonoscopy; Future    5. Bloating  - EGD; Future    6. Constipation, unspecified constipation type  - polyethylene glycol (GLYCOLAX) 17 GM/SCOOP powder; Mix with 64 oz of any fluid and drink over 8 hours  Dispense: 255 g; Refill: 0  - polyethylene glycol (MIRALAX) 17 g packet;  Take 17 g by mouth daily  Dispense: 60 each; Refill: 2    ______________________________________________________________________    SUBJECTIVE:  Jacobo Diamond is a 40 y.o. old pleasant female with PMH of history of hypothyroidism, [place] syndrome, iron deficiency anemia, anxiety depression who presents for consultation for constipation, abdominal pain, bloating and rectal bleeding. Patient states for the past few years she has had severe constipation having a bowel movement once per week or twice per week. She will intermittently have episodes of multiple loose stools. She also reports abdominal cramping and pain mainly in the lower abdomen. She has iron deficiency anemia which she attributes to menorrhagia. She also reports rectal bleeding 2 times per week mainly with wiping and in the water. She had previous hemorrhoid surgery in the past.    Answers for HPI/ROS submitted by the patient on 8/4/2023  Chronicity: recurrent  Onset: more than 1 month ago  Onset quality: gradual  Frequency: intermittently  Progression since onset: gradually worsening  Pain location: generalized abdominal region  Pain - numeric: 6/10  Pain quality: cramping, a sensation of fullness  anorexia: Yes  belching: Yes  constipation: Yes  diarrhea: Yes  flatus: Yes  nausea: Yes  vomiting: Yes  Relieved by: bowel movements, recumbency        I reviewed prior external notes    I reviewed previous lab results and images      REVIEW OF SYSTEMS:     REVIEW OF ALL OTHER SYSTEMS IS OTHERWISE NEGATIVE.       Historical Information   Past Medical History:   Diagnosis Date   • Anxiety 4/12/2016   • Cardiac disease     pulmonary stenosis   • Depression 9/3/2020   • Disease of thyroid gland     hypothyroidism   • Lumbar back pain with radiculopathy affecting left lower extremity 9/3/2020   • Lumbar back pain with radiculopathy affecting right lower extremity 9/3/2020   • Filemon syndrome    • Pulmonary stenosis      Past Surgical History:   Procedure Laterality Date   • CARDIAC SURGERY      pulmonary stenosis     Social History   Social History     Substance and Sexual Activity   Alcohol Use Never    Comment: social     Social History     Substance and Sexual Activity   Drug Use Never     Social History     Tobacco Use   Smoking Status Never   Smokeless Tobacco Never     Family History   Problem Relation Age of Onset   • Thyroid disease Mother    • Diabetes Father        Meds/Allergies       Current Outpatient Medications:   •  ergocalciferol (VITAMIN D2) 50,000 units  •  polyethylene glycol (GLYCOLAX) 17 GM/SCOOP powder  •  polyethylene glycol (MIRALAX) 17 g packet  •  Synthroid 125 MCG tablet  •  dicyclomine (BENTYL) 20 mg tablet  •  docusate sodium (COLACE) 100 mg capsule  •  escitalopram (LEXAPRO) 10 mg tablet  •  ferrous sulfate 324 (65 Fe) mg  •  naproxen (Naprosyn) 500 mg tablet  •  ondansetron (ZOFRAN) 4 mg tablet    Allergies   Allergen Reactions   • Bee Venom    • Pollen Extract            Objective     Blood pressure 116/62, pulse 74, height 4' 11" (1.499 m), weight 50.1 kg (110 lb 6.4 oz), SpO2 99 %. Body mass index is 22.3 kg/m². PHYSICAL EXAM:      General Appearance:   Alert, cooperative, no distress   HEENT:   Normocephalic, atraumatic, anicteric. Neck:  Supple, symmetrical, trachea midline   Lungs:   Clear to auscultation bilaterally; no rales, rhonchi or wheezing; respirations unlabored    Heart[de-identified]   Regular rate and rhythm; no murmur, rub, or gallop. Abdomen:   Soft, non-tender, non-distended; normal bowel sounds; no masses, no organomegaly    Genitalia:   Deferred    Rectal:   Deferred    Extremities:  No cyanosis, clubbing or edema    Pulses:  2+ and symmetric    Skin:  No jaundice, rashes, or lesions    Lymph nodes:  No palpable cervical lymphadenopathy        Lab Results:   No visits with results within 1 Day(s) from this visit.    Latest known visit with results is:   Appointment on 06/29/2023   Component Date Value   • TSH 3RD GENERATON 06/29/2023 6.792 (H)    • WBC 06/29/2023 4.93    • RBC 06/29/2023 4.69    • Hemoglobin 06/29/2023 11.2 (L)    • Hematocrit 06/29/2023 36.2    • MCV 06/29/2023 77 (L)    • MCH 06/29/2023 23.9 (L)    • MCHC 06/29/2023 30.9 (L)    • RDW 06/29/2023 16.7 (H)    • MPV 06/29/2023 11.5    • Platelets 92/16/7092 239    • nRBC 06/29/2023 0    • Neutrophils Relative 06/29/2023 56    • Immat GRANS % 06/29/2023 0    • Lymphocytes Relative 06/29/2023 25    • Monocytes Relative 06/29/2023 12    • Eosinophils Relative 06/29/2023 6    • Basophils Relative 06/29/2023 1    • Neutrophils Absolute 06/29/2023 2.76    • Immature Grans Absolute 06/29/2023 0.01    • Lymphocytes Absolute 06/29/2023 1.24    • Monocytes Absolute 06/29/2023 0.57    • Eosinophils Absolute 06/29/2023 0.29    • Basophils Absolute 06/29/2023 0.06    • Iron Saturation 06/29/2023 10 (L)    • TIBC 06/29/2023 404    • Iron 06/29/2023 40 (L)    • Ferritin 06/29/2023 3 (L)    • Free T4 06/29/2023 1.33 (H)          Radiology Results:   No results found.

## 2023-08-08 ENCOUNTER — HOSPITAL ENCOUNTER (OUTPATIENT)
Dept: INFUSION CENTER | Facility: CLINIC | Age: 38
Discharge: HOME | End: 2023-08-08
Payer: COMMERCIAL

## 2023-08-08 VITALS
TEMPERATURE: 98.2 F | HEART RATE: 69 BPM | SYSTOLIC BLOOD PRESSURE: 103 MMHG | DIASTOLIC BLOOD PRESSURE: 51 MMHG | RESPIRATION RATE: 18 BRPM

## 2023-08-08 DIAGNOSIS — D50.9 IRON DEFICIENCY ANEMIA, UNSPECIFIED IRON DEFICIENCY ANEMIA TYPE: Primary | ICD-10-CM

## 2023-08-08 RX ORDER — SODIUM CHLORIDE 9 MG/ML
20 INJECTION, SOLUTION INTRAVENOUS ONCE
Status: COMPLETED | OUTPATIENT
Start: 2023-08-08 | End: 2023-08-08

## 2023-08-08 RX ORDER — SODIUM CHLORIDE 9 MG/ML
20 INJECTION, SOLUTION INTRAVENOUS ONCE
Status: CANCELLED | OUTPATIENT
Start: 2023-08-15

## 2023-08-08 RX ADMIN — IRON SUCROSE 200 MG: 20 INJECTION, SOLUTION INTRAVENOUS at 13:24

## 2023-08-08 RX ADMIN — SODIUM CHLORIDE 20 ML/HR: 9 INJECTION, SOLUTION INTRAVENOUS at 13:21

## 2023-08-08 NOTE — PROGRESS NOTES
Pt presents for iv venofer, offers no complaints, tolerated infusion, AVS declined aware of next appt, d/c from unit stable

## 2023-08-15 ENCOUNTER — TELEPHONE (OUTPATIENT)
Dept: HEMATOLOGY ONCOLOGY | Facility: CLINIC | Age: 38
End: 2023-08-15

## 2023-08-15 ENCOUNTER — HOSPITAL ENCOUNTER (OUTPATIENT)
Dept: INFUSION CENTER | Facility: CLINIC | Age: 38
Discharge: HOME | End: 2023-08-15
Payer: COMMERCIAL

## 2023-08-15 VITALS
DIASTOLIC BLOOD PRESSURE: 56 MMHG | RESPIRATION RATE: 18 BRPM | HEART RATE: 67 BPM | TEMPERATURE: 97.9 F | SYSTOLIC BLOOD PRESSURE: 107 MMHG

## 2023-08-15 DIAGNOSIS — E03.8 HYPOTHYROIDISM DUE TO HASHIMOTO'S THYROIDITIS: ICD-10-CM

## 2023-08-15 DIAGNOSIS — D50.9 IRON DEFICIENCY ANEMIA, UNSPECIFIED IRON DEFICIENCY ANEMIA TYPE: Primary | ICD-10-CM

## 2023-08-15 DIAGNOSIS — E06.3 HYPOTHYROIDISM DUE TO HASHIMOTO'S THYROIDITIS: ICD-10-CM

## 2023-08-15 RX ORDER — SODIUM CHLORIDE 9 MG/ML
20 INJECTION, SOLUTION INTRAVENOUS ONCE
Status: COMPLETED | OUTPATIENT
Start: 2023-08-15 | End: 2023-08-15

## 2023-08-15 RX ORDER — SODIUM CHLORIDE 9 MG/ML
20 INJECTION, SOLUTION INTRAVENOUS ONCE
Status: CANCELLED | OUTPATIENT
Start: 2023-08-22

## 2023-08-15 RX ORDER — LEVOTHYROXINE SODIUM 125 MCG
125 TABLET ORAL DAILY
Qty: 60 TABLET | Refills: 0 | Status: SHIPPED | OUTPATIENT
Start: 2023-08-15

## 2023-08-15 RX ADMIN — IRON SUCROSE 200 MG: 20 INJECTION, SOLUTION INTRAVENOUS at 08:45

## 2023-08-15 RX ADMIN — SODIUM CHLORIDE 20 ML/HR: 9 INJECTION, SOLUTION INTRAVENOUS at 08:45

## 2023-08-15 NOTE — PROGRESS NOTES
Pt presents for venofer infusion offering no complaints. Pt tolerated treatment without incident. AVS declined, therapy plan complete.

## 2023-08-15 NOTE — TELEPHONE ENCOUNTER
I am reaching out regarding your appointment with Dr. Dannielle Viveros on 8/21/23    There has been a change to the providers schedule, and we will need to reschedule your appointment. I left a voicemail explaining to patient that this appointment will need to be rescheduled due to a change in the providers schedule. Patient was advised to call [facility] [phone] to reschedule.

## 2023-08-16 ENCOUNTER — TELEPHONE (OUTPATIENT)
Dept: HEMATOLOGY ONCOLOGY | Facility: CLINIC | Age: 38
End: 2023-08-16

## 2023-08-26 ENCOUNTER — APPOINTMENT (OUTPATIENT)
Dept: LAB | Facility: CLINIC | Age: 38
End: 2023-08-26
Payer: COMMERCIAL

## 2023-08-26 DIAGNOSIS — E03.8 HYPOTHYROIDISM DUE TO HASHIMOTO'S THYROIDITIS: ICD-10-CM

## 2023-08-26 DIAGNOSIS — D50.9 IRON DEFICIENCY ANEMIA, UNSPECIFIED IRON DEFICIENCY ANEMIA TYPE: ICD-10-CM

## 2023-08-26 DIAGNOSIS — E06.3 HYPOTHYROIDISM DUE TO HASHIMOTO'S THYROIDITIS: ICD-10-CM

## 2023-08-26 LAB
BASOPHILS # BLD AUTO: 0.06 THOUSANDS/ÂΜL (ref 0–0.1)
BASOPHILS NFR BLD AUTO: 1 % (ref 0–1)
EOSINOPHIL # BLD AUTO: 0.32 THOUSAND/ÂΜL (ref 0–0.61)
EOSINOPHIL NFR BLD AUTO: 6 % (ref 0–6)
ERYTHROCYTE [DISTWIDTH] IN BLOOD BY AUTOMATED COUNT: 20.2 % (ref 11.6–15.1)
FERRITIN SERPL-MCNC: 58 NG/ML (ref 11–307)
HCT VFR BLD AUTO: 40.9 % (ref 34.8–46.1)
HGB BLD-MCNC: 12.7 G/DL (ref 11.5–15.4)
IMM GRANULOCYTES # BLD AUTO: 0.02 THOUSAND/UL (ref 0–0.2)
IMM GRANULOCYTES NFR BLD AUTO: 0 % (ref 0–2)
IRON SATN MFR SERPL: 17 % (ref 15–50)
IRON SERPL-MCNC: 50 UG/DL (ref 50–212)
LYMPHOCYTES # BLD AUTO: 1.36 THOUSANDS/ÂΜL (ref 0.6–4.47)
LYMPHOCYTES NFR BLD AUTO: 24 % (ref 14–44)
MCH RBC QN AUTO: 25.1 PG (ref 26.8–34.3)
MCHC RBC AUTO-ENTMCNC: 31.1 G/DL (ref 31.4–37.4)
MCV RBC AUTO: 81 FL (ref 82–98)
MONOCYTES # BLD AUTO: 0.6 THOUSAND/ÂΜL (ref 0.17–1.22)
MONOCYTES NFR BLD AUTO: 11 % (ref 4–12)
NEUTROPHILS # BLD AUTO: 3.31 THOUSANDS/ÂΜL (ref 1.85–7.62)
NEUTS SEG NFR BLD AUTO: 58 % (ref 43–75)
NRBC BLD AUTO-RTO: 0 /100 WBCS
PLATELET # BLD AUTO: 207 THOUSANDS/UL (ref 149–390)
PMV BLD AUTO: 11.2 FL (ref 8.9–12.7)
RBC # BLD AUTO: 5.05 MILLION/UL (ref 3.81–5.12)
T4 FREE SERPL-MCNC: 0.92 NG/DL (ref 0.61–1.12)
TIBC SERPL-MCNC: 295 UG/DL (ref 250–450)
TSH SERPL DL<=0.05 MIU/L-ACNC: 9.39 UIU/ML (ref 0.45–4.5)
UIBC SERPL-MCNC: 245 UG/DL (ref 155–355)
WBC # BLD AUTO: 5.67 THOUSAND/UL (ref 4.31–10.16)

## 2023-08-26 PROCEDURE — 82728 ASSAY OF FERRITIN: CPT

## 2023-08-26 PROCEDURE — 83540 ASSAY OF IRON: CPT

## 2023-08-26 PROCEDURE — 36415 COLL VENOUS BLD VENIPUNCTURE: CPT

## 2023-08-26 PROCEDURE — 84443 ASSAY THYROID STIM HORMONE: CPT

## 2023-08-26 PROCEDURE — 83550 IRON BINDING TEST: CPT

## 2023-08-26 PROCEDURE — 85025 COMPLETE CBC W/AUTO DIFF WBC: CPT

## 2023-08-26 PROCEDURE — 84439 ASSAY OF FREE THYROXINE: CPT

## 2023-08-29 DIAGNOSIS — E06.3 HYPOTHYROIDISM DUE TO HASHIMOTO'S THYROIDITIS: ICD-10-CM

## 2023-08-29 DIAGNOSIS — E03.8 HYPOTHYROIDISM DUE TO HASHIMOTO'S THYROIDITIS: ICD-10-CM

## 2023-08-29 RX ORDER — LEVOTHYROXINE SODIUM 125 MCG
125 TABLET ORAL DAILY
Qty: 90 TABLET | Refills: 1 | Status: SHIPPED | OUTPATIENT
Start: 2023-08-29

## 2023-08-30 ENCOUNTER — RA CDI HCC (OUTPATIENT)
Dept: OTHER | Facility: HOSPITAL | Age: 38
End: 2023-08-30

## 2023-08-30 ENCOUNTER — OFFICE VISIT (OUTPATIENT)
Dept: HEMATOLOGY ONCOLOGY | Facility: CLINIC | Age: 38
End: 2023-08-30
Payer: COMMERCIAL

## 2023-08-30 VITALS
SYSTOLIC BLOOD PRESSURE: 118 MMHG | BODY MASS INDEX: 22.18 KG/M2 | DIASTOLIC BLOOD PRESSURE: 58 MMHG | OXYGEN SATURATION: 98 % | HEART RATE: 67 BPM | RESPIRATION RATE: 16 BRPM | WEIGHT: 110 LBS | HEIGHT: 59 IN | TEMPERATURE: 98.3 F

## 2023-08-30 DIAGNOSIS — D50.9 IRON DEFICIENCY ANEMIA, UNSPECIFIED IRON DEFICIENCY ANEMIA TYPE: Primary | ICD-10-CM

## 2023-08-30 PROCEDURE — 99213 OFFICE O/P EST LOW 20 MIN: CPT | Performed by: INTERNAL MEDICINE

## 2023-08-30 NOTE — PROGRESS NOTES
[address] [facility] coding opportunities     **[facility] spreadsheet       Chart Reviewed number of suggestions sent to Provider: 1  [facility]     [facility] Insurance        Commercial Insurance: [employer]

## 2023-08-30 NOTE — PROGRESS NOTES
Jose LOYOLA  [facility] [address] TORREZ  [facility] [address] [facility]  1985      PRIMARY HEMATOLOGIC/ONCOLOGIC DIAGNOSIS:  1. ANTONIO secondary to heavy menstrual periods    INTERIM HISTORY:  Vikash Darling presents for a follow-up. She was previously followed by Dr. Niki Chaparro for ANTONIO due to heavy menstrual periods. She was not able to tolerate PO iron and was given IV iron replacement.      PAST MEDICAL,PAST SURGICAL, FAMILY AND SOCIAL HISTORY:    Patient Active Problem List   Diagnosis   • Hypothyroidism   • Encounter for gynecological examination (general) (routine) without abnormal findings   • [place] syndrome   • Nonrheumatic pulmonary valve stenosis   • Congenital heart disease   • Anxiety and depression   • Droopy eyelid, left   • Vitamin D deficiency   • Iron deficiency anemia     Past Medical History:   Diagnosis Date   • Anxiety 4/12/2016   • Cardiac disease     pulmonary stenosis   • Depression 9/3/2020   • Disease of thyroid gland     hypothyroidism   • Lumbar back pain with radiculopathy affecting left lower extremity 9/3/2020   • Lumbar back pain with radiculopathy affecting right lower extremity 9/3/2020   • [place] syndrome    • Pulmonary stenosis      Past Surgical History:   Procedure Laterality Date   • CARDIAC SURGERY      pulmonary stenosis     Family History   Problem Relation Age of Onset   • Thyroid disease Mother    • Diabetes Father      Social History     Socioeconomic History   • Marital status: Single     Spouse name: Not on file   • Number of children: Not on file   • Years of education: Not on file   • Highest education level: Not on file   Occupational History   • Not on file   Tobacco Use   • Smoking status: Never   • Smokeless tobacco: Never   Vaping Use   • Vaping Use: Never used   Substance and Sexual Activity   • Alcohol use: Never     Comment: social   • Drug use: Never   • Sexual activity: Not Currently Partners: Male     Birth control/protection: None   Other Topics Concern   • Not on file   Social History Narrative   • Not on file     Social Determinants of Health     Financial Resource Strain: Not on file   Food Insecurity: Not on file   Transportation Needs: Not on file   Physical Activity: Not on file   Stress: Not on file   Social Connections: Not on file   Intimate Partner Violence: Not on file   Housing Stability: Not on file       Current Outpatient Medications:   •  ergocalciferol (VITAMIN D2) 50,000 units, TAKE 1 CAPSULE BY MOUTH ONE TIME PER WEEK (Patient not taking: Reported on 7/11/2023), Disp: 12 capsule, Rfl: 0  •  polyethylene glycol (GLYCOLAX) 17 GM/SCOOP powder, Mix with 64 oz of any fluid and drink over 8 hours, Disp: 255 g, Rfl: 0  •  polyethylene glycol (MIRALAX) 17 g packet, Take 17 g by mouth daily, Disp: 60 each, Rfl: 2  •  Synthroid 125 MCG tablet, TAKE 1 TABLET BY MOUTH EVERY DAY, Disp: 90 tablet, Rfl: 1  •  dicyclomine (BENTYL) 20 mg tablet, Take 1 tablet (20 mg total) by mouth every 6 (six) hours as needed (abdominal cramping) (Patient not taking: Reported on 7/11/2023), Disp: 30 tablet, Rfl: 0  •  docusate sodium (COLACE) 100 mg capsule, Take 1 capsule (100 mg total) by mouth 2 (two) times a day as needed for constipation (Patient not taking: Reported on 7/11/2023), Disp: 60 capsule, Rfl: 0  •  escitalopram (LEXAPRO) 10 mg tablet, TAKE 1 TABLET (10 MG TOTAL) BY MOUTH DAILY (Patient not taking: Reported on 7/11/2023), Disp: 30 tablet, Rfl: 2  •  ferrous sulfate 324 (65 Fe) mg, Take 1 tablet (324 mg total) by mouth 2 (two) times a day before meals, Disp: 180 tablet, Rfl: 0  •  naproxen (Naprosyn) 500 mg tablet, Take 1 tablet (500 mg total) by mouth 2 (two) times a day with meals As needed for lower abd pain (Patient not taking: Reported on 2/9/2023), Disp: 20 tablet, Rfl: 0  •  ondansetron (ZOFRAN) 4 mg tablet, Take 1 tablet (4 mg total) by mouth every 8 (eight) hours as needed for nausea or vomiting (Patient not taking: Reported on 7/11/2023), Disp: 20 tablet, Rfl: 0  Allergies   Allergen Reactions   • Bee Venom    • Pollen Extract      Vitals:    08/30/23 0758   BP: 118/58   Pulse: 67   Resp: 16   Temp: 98.3 °F (36.8 °C)   SpO2: 98%       ROS:  CONSTITUTIONAL:  No fever. No chills. No dizziness. No weakness. EYES:  No pain, erythema, or discharge. No blurring of vision. ENT:  No sore throat, URI symptoms. No epistaxis. No tinnitus. CARDIOVASCULAR:  No chest pain. No palpitations. No lower extremity edema. RESPIRATORY:  No shortness of breath, cough, pain with respiration, pleuritic chest pain. No hemoptysis. No dyspnea. No paroxysmal nocturnal dyspnea. GASTROINTESTINAL:  Normal appetite. No nausea, vomiting, diarrhea. No pain. No bloating. No melena. GENITOURINARY:  No frequency, urgency, nocturia. No hematuria or dysuria. MUSCULOSKELETAL:  No arthralgias or myalgias. INTEGUMENTARY:  No swelling. No bruising. No contusions. No abrasions. No lymphangitis. NEUROLOGIC:  No headache. No neck pain. No numbness or tingling of the extremities. No weakness. PSYCHIATRIC:  No confusion. ENDOCRINE:  No fatigue. No weakness. No history of thyroid, diabetes or adrenal problems. HEMATOLOGICAL:  No bleeding. No petechiae. No bruising.     PHYSICAL EXAM:    GENERAL: AAO x 3  HEENT: AT,NC  CVS: S1S2 RRR  LUNGS: CTA b/l  ABD: NT,ND, +BS  EXTR: no edema  NEURO: CN II-XII grossly intact    LABS:  I have reviewed pertinent labs:  CBC:   Lab Results   Component Value Date    WBC 5.67 08/26/2023    RBC 5.05 08/26/2023    HGB 12.7 08/26/2023    HCT 40.9 08/26/2023    MCV 81 (L) 08/26/2023     08/26/2023    MCH 25.1 (L) 08/26/2023    MCHC 31.1 (L) 08/26/2023    RDW 20.2 (H) 08/26/2023    MPV 11.2 08/26/2023    NEUTROABS 3.31 08/26/2023     CMP:   Lab Results   Component Value Date    SODIUM 138 03/17/2023    K 3.9 03/17/2023     (H) 03/17/2023    CO2 25 03/17/2023    AGAP 4 03/17/2023    BUN 12 03/17/2023    CREATININE 0.58 (L) 03/17/2023    GLUC 98 06/09/2022    GLUF 89 03/17/2023    CALCIUM 8.7 03/17/2023    AST 8 03/17/2023    ALT 14 03/17/2023    ALKPHOS 57 03/17/2023    TP 7.7 03/17/2023    ALB 3.9 03/17/2023    TBILI 0.48 03/17/2023    EGFR 118 03/17/2023     Liver Enzymes:   Lab Results   Component Value Date    AST 8 03/17/2023    ALT 14 03/17/2023    ALKPHOS 57 03/17/2023    TP 7.7 03/17/2023    ALB 3.9 03/17/2023    TBILI 0.48 03/17/2023     Vitamin B12 No results found for: "[id number]"  Iron Study   Lab Results   Component Value Date    FERRITIN 58 08/26/2023    CONCFE 17 08/26/2023    TIBC 295 08/26/2023    IRON 50 08/26/2023       IMAGING:  No results found. I reviewed the above laboratory and imaging data. ASSESSMENT/PLAN:  1. ANTONIO secondary to heavy menstrual periods. S/p IV iron replacement. She follows with GYN. She has refused OCPs. Scheduled for colonoscopy end of September. Will continue to monitor labs Q3m. Will schedule IV iron as needed.

## 2023-09-29 ENCOUNTER — HOSPITAL ENCOUNTER (OUTPATIENT)
Dept: GASTROENTEROLOGY | Facility: HOSPITAL | Age: 38
Setting detail: OUTPATIENT SURGERY
End: 2023-09-29
Attending: INTERNAL MEDICINE
Payer: COMMERCIAL

## 2023-09-29 ENCOUNTER — ANESTHESIA (OUTPATIENT)
Dept: GASTROENTEROLOGY | Facility: HOSPITAL | Age: 38
End: 2023-09-29

## 2023-09-29 ENCOUNTER — ANESTHESIA EVENT (OUTPATIENT)
Dept: GASTROENTEROLOGY | Facility: HOSPITAL | Age: 38
End: 2023-09-29

## 2023-09-29 VITALS
DIASTOLIC BLOOD PRESSURE: 67 MMHG | SYSTOLIC BLOOD PRESSURE: 111 MMHG | RESPIRATION RATE: 20 BRPM | TEMPERATURE: 97.8 F | HEART RATE: 80 BPM | OXYGEN SATURATION: 98 %

## 2023-09-29 DIAGNOSIS — R19.7 DIARRHEA, UNSPECIFIED TYPE: ICD-10-CM

## 2023-09-29 DIAGNOSIS — R10.9 ABDOMINAL CRAMPING: ICD-10-CM

## 2023-09-29 DIAGNOSIS — R14.0 BLOATING: ICD-10-CM

## 2023-09-29 DIAGNOSIS — K62.5 RECTAL BLEEDING: ICD-10-CM

## 2023-09-29 DIAGNOSIS — R10.9 ABDOMINAL PAIN, UNSPECIFIED ABDOMINAL LOCATION: ICD-10-CM

## 2023-09-29 LAB
EXT PREGNANCY TEST URINE: NEGATIVE
EXT. CONTROL: NORMAL

## 2023-09-29 PROCEDURE — 88305 TISSUE EXAM BY PATHOLOGIST: CPT | Performed by: STUDENT IN AN ORGANIZED HEALTH CARE EDUCATION/TRAINING PROGRAM

## 2023-09-29 PROCEDURE — 81025 URINE PREGNANCY TEST: CPT | Performed by: INTERNAL MEDICINE

## 2023-09-29 PROCEDURE — 45380 COLONOSCOPY AND BIOPSY: CPT | Performed by: INTERNAL MEDICINE

## 2023-09-29 PROCEDURE — 88342 IMHCHEM/IMCYTCHM 1ST ANTB: CPT | Performed by: STUDENT IN AN ORGANIZED HEALTH CARE EDUCATION/TRAINING PROGRAM

## 2023-09-29 PROCEDURE — 43239 EGD BIOPSY SINGLE/MULTIPLE: CPT | Performed by: INTERNAL MEDICINE

## 2023-09-29 RX ORDER — MIDAZOLAM HYDROCHLORIDE 2 MG/2ML
INJECTION, SOLUTION INTRAMUSCULAR; INTRAVENOUS AS NEEDED
Status: DISCONTINUED | OUTPATIENT
Start: 2023-09-29 | End: 2023-09-29

## 2023-09-29 RX ORDER — PROPOFOL 10 MG/ML
INJECTION, EMULSION INTRAVENOUS AS NEEDED
Status: DISCONTINUED | OUTPATIENT
Start: 2023-09-29 | End: 2023-09-29

## 2023-09-29 RX ORDER — LIDOCAINE HYDROCHLORIDE 20 MG/ML
INJECTION, SOLUTION EPIDURAL; INFILTRATION; INTRACAUDAL; PERINEURAL AS NEEDED
Status: DISCONTINUED | OUTPATIENT
Start: 2023-09-29 | End: 2023-09-29

## 2023-09-29 RX ADMIN — PROPOFOL 30 MG: 10 INJECTION, EMULSION INTRAVENOUS at 07:39

## 2023-09-29 RX ADMIN — PROPOFOL 30 MG: 10 INJECTION, EMULSION INTRAVENOUS at 07:50

## 2023-09-29 RX ADMIN — LIDOCAINE HYDROCHLORIDE 100 MG: 20 INJECTION, SOLUTION EPIDURAL; INFILTRATION; INTRACAUDAL; PERINEURAL at 07:37

## 2023-09-29 RX ADMIN — PROPOFOL 30 MG: 10 INJECTION, EMULSION INTRAVENOUS at 07:42

## 2023-09-29 RX ADMIN — PROPOFOL 30 MG: 10 INJECTION, EMULSION INTRAVENOUS at 07:44

## 2023-09-29 RX ADMIN — PROPOFOL 30 MG: 10 INJECTION, EMULSION INTRAVENOUS at 07:47

## 2023-09-29 RX ADMIN — PROPOFOL 110 MG: 10 INJECTION, EMULSION INTRAVENOUS at 07:37

## 2023-09-29 RX ADMIN — MIDAZOLAM HYDROCHLORIDE 2 MG: 1 INJECTION, SOLUTION INTRAMUSCULAR; INTRAVENOUS at 07:35

## 2023-09-29 NOTE — H&P
History and Physical -  [facility]  Lui Lopez 40 y.o. female MRN: [id number]                  HPI: Lui Lopez is a 40y.o. year old female who presents for EGD/colonoscopy for abdominal discomfort rectal bleeding iron deficiency anemia. REVIEW OF SYSTEMS: Per the HPI, and otherwise unremarkable.     Historical Information   Past Medical History:   Diagnosis Date   • Anxiety 4/12/2016   • Cardiac disease     pulmonary stenosis   • Depression 9/3/2020   • Disease of thyroid gland     hypothyroidism   • Lumbar back pain with radiculopathy affecting left lower extremity 9/3/2020   • Lumbar back pain with radiculopathy affecting right lower extremity 9/3/2020   • [place] syndrome    • Pulmonary stenosis      Past Surgical History:   Procedure Laterality Date   • CARDIAC SURGERY      pulmonary stenosis     Social History   Social History     Substance and Sexual Activity   Alcohol Use Never    Comment: social     Social History     Substance and Sexual Activity   Drug Use Never     Social History     Tobacco Use   Smoking Status Never   Smokeless Tobacco Never     Family History   Problem Relation Age of Onset   • Thyroid disease Mother    • Diabetes Father        Meds/Allergies       Current Outpatient Medications:   •  ergocalciferol (VITAMIN D2) 50,000 units  •  Synthroid 125 MCG tablet  •  dicyclomine (BENTYL) 20 mg tablet  •  polyethylene glycol (GLYCOLAX) 17 GM/SCOOP powder  •  polyethylene glycol (MIRALAX) 17 g packet    Allergies   Allergen Reactions   • Bee Venom    • Pollen Extract        Objective     /71   Pulse 84   Temp 98.6 °F (37 °C) (Temporal)   Resp 16   LMP 09/01/2023 (Approximate)   SpO2 98%       PHYSICAL EXAM    Gen: NAD  Head: NCAT  CV: RRR  CHEST: Clear  ABD: soft, NT/ND  EXT: no edema      ASSESSMENT/PLAN:   Lui Lopez is a 40y.o. year old female who presents for EGD/colonoscopy for abdominal discomfort rectal bleeding iron deficiency anemia. The patient is stable and optimized for the procedure, we reviewed risk and benefits. Risk include but not limited to infection, bleeding, perforation and missing a lesion.

## 2023-09-29 NOTE — ANESTHESIA POSTPROCEDURE EVALUATION
Post-Op Assessment Note    CV Status:  Stable  Pain Score: 0    Pain management: adequate     Mental Status:  Sleepy   Hydration Status:  Stable   PONV Controlled:  None   Airway Patency:  Patent      Post Op Vitals Reviewed: Yes            No notable events documented.

## 2023-09-29 NOTE — ANESTHESIA PREPROCEDURE EVALUATION
Procedure:  COLONOSCOPY  EGD    Relevant Problems   CARDIO   (+) Nonrheumatic pulmonary valve stenosis      ENDO   (+) Hypothyroidism      HEMATOLOGY   (+) Iron deficiency anemia      NEURO/PSYCH   (+) Anxiety and depression      Cardiovascular and Mediastinum   (+) Congenital heart disease      Other   (+) Droopy eyelid, left   (+) [place] syndrome     TTE 3/2022:  Left Ventricle   Left ventricle is normal in size. Wall thickness is normal. Systolic function is normal with an ejection fraction of 60-65%. Wall motion is within normal limits. There is normal diastolic function. Right Ventricle   Right ventricle cavity is normal. Systolic function is normal.     Left Atrium   Left atrium cavity size is normal. Patent foramen ovale visualized. Right Atrium   Right atrium cavity is normal.     IVC/SVC   The inferior vena cava demonstrates a diameter of <=21 mm and collapses >50%; therefore, the right atrial pressure is estimated at 0-5 mmHg. Mitral Valve   Mitral valve structure is normal. There is trace regurgitation. There is no evidence of mitral valve stenosis. Tricuspid Valve   Tricuspid valve structure is normal. There is trace regurgitation. There is no evidence of tricuspid valve stenosis. Aortic Valve   The aortic valve is trileaflet. There is no regurgitation or stenosis. Pulmonic Valve   Pulmonic valve structure is normal. Normal velocity across the pulmonic valve with no evidence of stenosis. There is no regurgitation or stenosis. The estimated pulmonary artery systolic pressure is 40.5 mmHg. Normal pulmonary artery pressure. Physical Exam    Airway    Mallampati score: II  TM Distance: >3 FB  Neck ROM: full     Dental       Cardiovascular      Pulmonary      Other Findings        Anesthesia Plan  ASA Score- 3     Anesthesia Type- IV sedation with anesthesia with ASA Monitors.          Additional Monitors:   Airway Plan:     Comment: Recent labs personally reviewed:  Lab Results       Component                Value               Date                       WBC                      5.67                08/26/2023                 HGB                      12.7                08/26/2023                 PLT                      207                 08/26/2023            Lab Results       Component                Value               Date                       K                        3.9                 03/17/2023                 BUN                      12                  03/17/2023                 CREATININE               0.58 (L)            03/17/2023            No results found for: "PTT"   No results found for: "INR"    Blood type     I, Ramses Johansen MD, have personally seen and evaluated the patient prior to anesthetic care. I have reviewed the pre-anesthetic record, medical history, allergies, medications and any other medical records if appropriate to the anesthetic care. If a CRNA is involved in the case, I have reviewed the CRNA assessment, if present, and agree. Patient consented for IV Sedation, general anesthesia as back up. Discussed risks of aspiration, IV infiltration, indications for conversion to general anesthesia. All questions and concerns addressed. .       Plan Factors-Exercise tolerance (METS): >4 METS. Chart reviewed. EKG reviewed. Imaging results reviewed. Existing labs reviewed. Patient summary reviewed. Patient is not a current smoker. Patient did not smoke on day of surgery. Obstructive sleep apnea risk education given perioperatively. Induction- intravenous. Postoperative Plan-     Informed Consent- Anesthetic plan and risks discussed with patient. I personally reviewed this patient with the CRNA. Discussed and agreed on the Anesthesia Plan with the CRNA. Roxanne Lance

## 2023-10-05 PROCEDURE — 88305 TISSUE EXAM BY PATHOLOGIST: CPT | Performed by: STUDENT IN AN ORGANIZED HEALTH CARE EDUCATION/TRAINING PROGRAM

## 2023-10-05 PROCEDURE — 88342 IMHCHEM/IMCYTCHM 1ST ANTB: CPT | Performed by: STUDENT IN AN ORGANIZED HEALTH CARE EDUCATION/TRAINING PROGRAM

## 2023-10-10 DIAGNOSIS — E06.3 HYPOTHYROIDISM DUE TO HASHIMOTO'S THYROIDITIS: ICD-10-CM

## 2023-10-10 DIAGNOSIS — E03.8 HYPOTHYROIDISM DUE TO HASHIMOTO'S THYROIDITIS: ICD-10-CM

## 2023-10-10 RX ORDER — LEVOTHYROXINE SODIUM 125 MCG
125 TABLET ORAL DAILY
Qty: 90 TABLET | Refills: 0 | Status: SHIPPED | OUTPATIENT
Start: 2023-10-10

## 2023-11-08 DIAGNOSIS — I37.0 NONRHEUMATIC PULMONARY VALVE STENOSIS: Primary | ICD-10-CM

## 2023-11-08 RX ORDER — AMOXICILLIN 500 MG/1
2000 TABLET, FILM COATED ORAL ONCE
Qty: 4 TABLET | Refills: 0 | Status: SHIPPED | OUTPATIENT
Start: 2023-11-08 | End: 2023-11-08

## 2023-11-21 ENCOUNTER — TELEPHONE (OUTPATIENT)
Dept: HEMATOLOGY ONCOLOGY | Facility: CLINIC | Age: 38
End: 2023-11-21

## 2023-11-29 ENCOUNTER — OFFICE VISIT (OUTPATIENT)
Dept: FAMILY MEDICINE CLINIC | Facility: CLINIC | Age: 38
End: 2023-11-29
Payer: COMMERCIAL

## 2023-11-29 ENCOUNTER — APPOINTMENT (OUTPATIENT)
Dept: RADIOLOGY | Facility: CLINIC | Age: 38
End: 2023-11-29
Payer: COMMERCIAL

## 2023-11-29 VITALS
SYSTOLIC BLOOD PRESSURE: 108 MMHG | TEMPERATURE: 98.7 F | OXYGEN SATURATION: 99 % | BODY MASS INDEX: 23.18 KG/M2 | HEIGHT: 59 IN | HEART RATE: 73 BPM | DIASTOLIC BLOOD PRESSURE: 76 MMHG | WEIGHT: 115 LBS

## 2023-11-29 DIAGNOSIS — R06.2 WHEEZING: ICD-10-CM

## 2023-11-29 DIAGNOSIS — R06.02 SOB (SHORTNESS OF BREATH): ICD-10-CM

## 2023-11-29 DIAGNOSIS — R05.3 CHRONIC COUGH: Primary | ICD-10-CM

## 2023-11-29 DIAGNOSIS — R05.3 CHRONIC COUGH: ICD-10-CM

## 2023-11-29 PROCEDURE — 99214 OFFICE O/P EST MOD 30 MIN: CPT | Performed by: PHYSICIAN ASSISTANT

## 2023-11-29 PROCEDURE — 71046 X-RAY EXAM CHEST 2 VIEWS: CPT

## 2023-11-29 RX ORDER — ALBUTEROL SULFATE 90 UG/1
2 AEROSOL, METERED RESPIRATORY (INHALATION) EVERY 6 HOURS PRN
Qty: 6.7 G | Refills: 5 | Status: SHIPPED | OUTPATIENT
Start: 2023-11-29

## 2023-11-29 NOTE — PROGRESS NOTES
Name: Sylvie Kinney      : 1985      MRN: [id number]  Encounter Provider: Tyler Ruby PA-C  Encounter Date: 2023   Encounter department: [address] [place] [address]     1. Chronic cough  -     Complete PFT with post bronchodilator; Future  -     XR chest pa & lateral; Future; Expected date: 2023    2. SOB (shortness of breath)  -     Complete PFT with post bronchodilator; Future  -     XR chest pa & lateral; Future; Expected date: 2023    3. Wheezing  -     Complete PFT with post bronchodilator; Future  -     XR chest pa & lateral; Future; Expected date: 2023  -     albuterol (Proventil HFA) 90 mcg/act inhaler; Inhale 2 puffs every 6 (six) hours as needed for wheezing    Seek PFTs, CXR for chronic cough with wheezing/SOB. If unremarkable consider allergic etiology vs silent reflux. Trial prn albuterol when having wheezing/coughing fits. Will follow with results. Subjective     Pt presents with intermittent chronic cough. Associated SOB and occaisonal wheezing. Known hx of pulmonic stenosis and follows with cardiology, recently had unremarkable stress echo. She has never been tested for asthma. Denies signififcant GERD sx. Seems to be worse with activity, cold weather, talking too much, after drinking cold things. Cough  This is a recurrent problem. The current episode started more than 1 year ago. The problem has been gradually worsening. The problem occurs every few hours. The cough is Non-productive and productive of sputum. Associated symptoms include chest pain, nasal congestion, postnasal drip, shortness of breath and wheezing. Pertinent negatives include no chills, ear pain, fever, rhinorrhea or sore throat. The symptoms are aggravated by cold air, dust, exercise and lying down. Risk factors for lung disease include smoking/tobacco exposure. Review of Systems   Constitutional:  Negative for chills, fatigue and fever.    HENT: Positive for postnasal drip. Negative for congestion, ear pain, hearing loss, nosebleeds, rhinorrhea, sinus pressure, sinus pain, sneezing and sore throat. Eyes:  Negative for pain, discharge, itching and visual disturbance. Respiratory:  Positive for cough, shortness of breath and wheezing. Negative for chest tightness. Cardiovascular:  Positive for chest pain. Negative for palpitations and leg swelling. Gastrointestinal:  Negative for abdominal pain, blood in stool, constipation, diarrhea, nausea and vomiting. Genitourinary:  Negative for frequency and urgency. Neurological:  Negative for dizziness, light-headedness and numbness.        Past Medical History:   Diagnosis Date    Anxiety 4/12/2016    Cardiac disease     pulmonary stenosis    Depression 9/3/2020    Disease of thyroid gland     hypothyroidism    Lumbar back pain with radiculopathy affecting left lower extremity 9/3/2020    Lumbar back pain with radiculopathy affecting right lower extremity 9/3/2020    [place] syndrome     Pulmonary stenosis      Past Surgical History:   Procedure Laterality Date    CARDIAC SURGERY      pulmonary stenosis     Family History   Problem Relation Age of Onset    Thyroid disease Mother     Diabetes Father      Social History     Socioeconomic History    Marital status: Single     Spouse name: None    Number of children: None    Years of education: None    Highest education level: None   Occupational History    None   Tobacco Use    Smoking status: Never    Smokeless tobacco: Never   Vaping Use    Vaping Use: Never used   Substance and Sexual Activity    Alcohol use: Never     Comment: social    Drug use: Never    Sexual activity: Not Currently     Partners: Male     Birth control/protection: None   Other Topics Concern    None   Social History Narrative    None     Social Determinants of Health     Financial Resource Strain: Not on file   Food Insecurity: Not on file   Transportation Needs: Not on file Physical Activity: Not on file   Stress: Not on file   Social Connections: Not on file   Intimate Partner Violence: Not on file   Housing Stability: Not on file     Current Outpatient Medications on File Prior to Visit   Medication Sig    Synthroid 125 MCG tablet Take 1 tablet (125 mcg total) by mouth daily    [DISCONTINUED] dicyclomine (BENTYL) 20 mg tablet Take 1 tablet (20 mg total) by mouth every 6 (six) hours as needed (abdominal cramping) (Patient not taking: Reported on 7/11/2023)    [DISCONTINUED] ergocalciferol (VITAMIN D2) 50,000 units TAKE 1 CAPSULE BY MOUTH ONE TIME PER WEEK (Patient not taking: Reported on 7/11/2023)    [DISCONTINUED] polyethylene glycol (GLYCOLAX) 17 GM/SCOOP powder Mix with 64 oz of any fluid and drink over 8 hours    [DISCONTINUED] polyethylene glycol (MIRALAX) 17 g packet Take 17 g by mouth daily     Allergies   Allergen Reactions    Bee Venom     Pollen Extract      Immunization History   Administered Date(s) Administered    Influenza, injectable, quadrivalent, preservative free 0.5 mL 10/02/2020, 10/12/2021    Tdap 01/27/2017    Tuberculin Skin Test-PPD Intradermal 10/02/2020       Objective     /76 (BP Location: Left arm, Patient Position: Sitting, Cuff Size: Adult)   Pulse 73   Temp 98.7 °F (37.1 °C)   Ht 4' 11" (1.499 m)   Wt 52.2 kg (115 lb)   SpO2 99%   BMI 23.23 kg/m²     Physical Exam  Vitals and nursing note reviewed. Constitutional:       General: She is not in acute distress. Appearance: Normal appearance. HENT:      Head: Normocephalic and atraumatic. Nose: Nose normal.   Eyes:      Pupils: Pupils are equal, round, and reactive to light. Cardiovascular:      Rate and Rhythm: Normal rate and regular rhythm. Heart sounds: Normal heart sounds. Pulmonary:      Effort: Pulmonary effort is normal. No respiratory distress. Breath sounds: Normal breath sounds. No wheezing, rhonchi or rales.    Musculoskeletal:         General: Normal range of motion. Cervical back: Normal range of motion and neck supple. Skin:     General: Skin is warm and dry. Neurological:      Mental Status: She is alert and oriented to person, place, and time.    Psychiatric:         Mood and Affect: Mood and affect normal.       Emma Abebe PA-C

## 2023-12-06 ENCOUNTER — HOSPITAL ENCOUNTER (OUTPATIENT)
Dept: PULMONOLOGY | Facility: HOSPITAL | Age: 38
Discharge: HOME | End: 2023-12-06
Payer: COMMERCIAL

## 2023-12-06 DIAGNOSIS — R05.3 CHRONIC COUGH: ICD-10-CM

## 2023-12-06 DIAGNOSIS — R06.2 WHEEZING: ICD-10-CM

## 2023-12-06 DIAGNOSIS — R09.89 PULMONARY AIR TRAPPING: Primary | ICD-10-CM

## 2023-12-06 DIAGNOSIS — R06.02 SOB (SHORTNESS OF BREATH): ICD-10-CM

## 2023-12-06 PROCEDURE — 94729 DIFFUSING CAPACITY: CPT

## 2023-12-06 PROCEDURE — 94060 EVALUATION OF WHEEZING: CPT | Performed by: INTERNAL MEDICINE

## 2023-12-06 PROCEDURE — 94726 PLETHYSMOGRAPHY LUNG VOLUMES: CPT | Performed by: INTERNAL MEDICINE

## 2023-12-06 PROCEDURE — 94726 PLETHYSMOGRAPHY LUNG VOLUMES: CPT

## 2023-12-06 PROCEDURE — 94060 EVALUATION OF WHEEZING: CPT

## 2023-12-06 PROCEDURE — 94729 DIFFUSING CAPACITY: CPT | Performed by: INTERNAL MEDICINE

## 2023-12-06 PROCEDURE — 94760 N-INVAS EAR/PLS OXIMETRY 1: CPT

## 2023-12-06 RX ORDER — FLUTICASONE PROPIONATE AND SALMETEROL 250; 50 UG/1; UG/1
1 POWDER RESPIRATORY (INHALATION) 2 TIMES DAILY
Qty: 60 BLISTER | Refills: 5 | Status: SHIPPED | OUTPATIENT
Start: 2023-12-06 | End: 2023-12-08

## 2023-12-06 RX ORDER — ALBUTEROL SULFATE 2.5 MG/3ML
2.5 SOLUTION RESPIRATORY (INHALATION) ONCE
Status: COMPLETED | OUTPATIENT
Start: 2023-12-06 | End: 2023-12-06

## 2023-12-06 RX ADMIN — ALBUTEROL SULFATE 2.5 MG: 2.5 SOLUTION RESPIRATORY (INHALATION) at 07:38

## 2023-12-08 ENCOUNTER — TELEPHONE (OUTPATIENT)
Dept: FAMILY MEDICINE CLINIC | Facility: CLINIC | Age: 38
End: 2023-12-08

## 2023-12-08 DIAGNOSIS — R09.89 PULMONARY AIR TRAPPING: Primary | ICD-10-CM

## 2023-12-08 DIAGNOSIS — R05.3 CHRONIC COUGH: ICD-10-CM

## 2023-12-08 RX ORDER — BUDESONIDE 90 UG/1
1 AEROSOL, POWDER RESPIRATORY (INHALATION) 2 TIMES DAILY
Qty: 1 EACH | Refills: 1 | Status: SHIPPED | OUTPATIENT
Start: 2023-12-08

## 2023-12-08 NOTE — TELEPHONE ENCOUNTER
Shabnam my name is Armida Chou. My birthday is 1985. I was given an inhaler and my insurance only barely covers it. So it's like over $100. And I just wanted to see if there was anything else that could be prescribed that might be a little bit on the cheaper side. If you can give me a call back, it's [phone].  Thank you

## 2023-12-08 NOTE — TELEPHONE ENCOUNTER
I tried to send another one, if it is not well covered please have her call her insurance and ask what asthma inhalers are covered

## 2023-12-13 DIAGNOSIS — E03.8 HYPOTHYROIDISM DUE TO HASHIMOTO'S THYROIDITIS: Primary | ICD-10-CM

## 2023-12-13 DIAGNOSIS — E03.8 HYPOTHYROIDISM DUE TO HASHIMOTO'S THYROIDITIS: ICD-10-CM

## 2023-12-13 DIAGNOSIS — E06.3 HYPOTHYROIDISM DUE TO HASHIMOTO'S THYROIDITIS: Primary | ICD-10-CM

## 2023-12-13 DIAGNOSIS — E06.3 HYPOTHYROIDISM DUE TO HASHIMOTO'S THYROIDITIS: ICD-10-CM

## 2023-12-13 RX ORDER — LEVOTHYROXINE SODIUM 125 MCG
125 TABLET ORAL DAILY
Qty: 90 TABLET | Refills: 1 | Status: SHIPPED | OUTPATIENT
Start: 2023-12-13

## 2023-12-18 ENCOUNTER — DOCUMENTATION (OUTPATIENT)
Age: 38
End: 2023-12-18

## 2023-12-18 DIAGNOSIS — F41.9 ANXIETY AND DEPRESSION: Primary | ICD-10-CM

## 2023-12-18 DIAGNOSIS — F32.A ANXIETY AND DEPRESSION: Primary | ICD-10-CM

## 2023-12-18 NOTE — PROGRESS NOTES
Psychotherapy Discharge Summary    Preferred Name: Katherin Barragan  YOB: 1985    Admission date to psychotherapy: 12/29/22    Referred by: Yasir Chino PA-C    Presenting Problem: Depression, anxiety and low self esteem    Course of treatment included : individual therapy     Progress/Outcome of Treatment Goals (brief summary of course of treatment) Katherin was able to make positive lifestyle changes to elevate self esteem, and find a new job    Treatment Complications (if any): None    Treatment Progress: good    Current [facility] Psychiatric Provider: None    Discharge Medications include: See Chart    Discharge Date: 12/18/23    Discharge Diagnosis:   1. Anxiety and depression            Criteria for Discharge: completed treatment goals and objectives and is no longer in need of services    Aftercare recommendations include (include specific referral names and phone numbers, if appropriate): None    Prognosis: good

## 2024-02-02 ENCOUNTER — TELEPHONE (OUTPATIENT)
Dept: FAMILY MEDICINE CLINIC | Facility: CLINIC | Age: 39
End: 2024-02-02

## 2024-02-02 NOTE — TELEPHONE ENCOUNTER
Katherin called asking for you to order her another PFT test as she went in December and started the inhaler. She said that you wanted her to have another PFT to reassess

## 2024-02-21 PROBLEM — Z01.419 ENCOUNTER FOR GYNECOLOGICAL EXAMINATION (GENERAL) (ROUTINE) WITHOUT ABNORMAL FINDINGS: Status: RESOLVED | Noted: 2020-01-22 | Resolved: 2024-02-21

## 2024-04-08 DIAGNOSIS — R06.2 WHEEZING: ICD-10-CM

## 2024-04-08 DIAGNOSIS — E03.8 HYPOTHYROIDISM DUE TO HASHIMOTO'S THYROIDITIS: ICD-10-CM

## 2024-04-08 DIAGNOSIS — R05.3 CHRONIC COUGH: ICD-10-CM

## 2024-04-08 DIAGNOSIS — R09.89 PULMONARY AIR TRAPPING: ICD-10-CM

## 2024-04-08 DIAGNOSIS — E06.3 HYPOTHYROIDISM DUE TO HASHIMOTO'S THYROIDITIS: ICD-10-CM

## 2024-04-09 RX ORDER — BUDESONIDE 90 UG/1
1 AEROSOL, POWDER RESPIRATORY (INHALATION) 2 TIMES DAILY
Qty: 1 EACH | Refills: 0 | Status: SHIPPED | OUTPATIENT
Start: 2024-04-09

## 2024-04-09 RX ORDER — LEVOTHYROXINE SODIUM 125 MCG
125 TABLET ORAL DAILY
Qty: 90 TABLET | Refills: 0 | Status: SHIPPED | OUTPATIENT
Start: 2024-04-09

## 2024-04-09 RX ORDER — ALBUTEROL SULFATE 90 UG/1
2 AEROSOL, METERED RESPIRATORY (INHALATION) EVERY 6 HOURS PRN
Qty: 6.7 G | Refills: 0 | Status: SHIPPED | OUTPATIENT
Start: 2024-04-09

## 2024-05-03 DIAGNOSIS — R06.2 WHEEZING: ICD-10-CM

## 2024-05-05 RX ORDER — ALBUTEROL SULFATE 90 UG/1
AEROSOL, METERED RESPIRATORY (INHALATION)
Qty: 8.5 G | Refills: 0 | Status: SHIPPED | OUTPATIENT
Start: 2024-05-05

## 2024-07-07 DIAGNOSIS — E03.8 HYPOTHYROIDISM DUE TO HASHIMOTO'S THYROIDITIS: ICD-10-CM

## 2024-07-07 DIAGNOSIS — E06.3 HYPOTHYROIDISM DUE TO HASHIMOTO'S THYROIDITIS: ICD-10-CM

## 2024-07-07 RX ORDER — LEVOTHYROXINE SODIUM 0.12 MG/1
125 TABLET ORAL DAILY
Qty: 90 TABLET | Refills: 0 | Status: SHIPPED | OUTPATIENT
Start: 2024-07-07

## 2024-07-27 ENCOUNTER — OFFICE VISIT (OUTPATIENT)
Dept: URGENT CARE | Facility: CLINIC | Age: 39
End: 2024-07-27
Payer: COMMERCIAL

## 2024-07-27 VITALS
WEIGHT: 113.6 LBS | BODY MASS INDEX: 22.94 KG/M2 | HEART RATE: 83 BPM | SYSTOLIC BLOOD PRESSURE: 118 MMHG | OXYGEN SATURATION: 97 % | TEMPERATURE: 98.2 F | DIASTOLIC BLOOD PRESSURE: 61 MMHG

## 2024-07-27 DIAGNOSIS — R00.2 PALPITATIONS: Primary | ICD-10-CM

## 2024-07-27 LAB
ATRIAL RATE: 70 BPM
P AXIS: 50 DEGREES
PR INTERVAL: 160 MS
QRS AXIS: -30 DEGREES
QRSD INTERVAL: 86 MS
QT INTERVAL: 426 MS
QTC INTERVAL: 460 MS
T WAVE AXIS: 98 DEGREES
VENTRICULAR RATE: 70 BPM

## 2024-07-27 PROCEDURE — 93010 ELECTROCARDIOGRAM REPORT: CPT | Performed by: INTERNAL MEDICINE

## 2024-07-27 PROCEDURE — 93005 ELECTROCARDIOGRAM TRACING: CPT

## 2024-07-27 PROCEDURE — 99213 OFFICE O/P EST LOW 20 MIN: CPT

## 2024-07-27 NOTE — PROGRESS NOTES
[facility] Now        NAME: Katherin Barragan is a 38 y.o. female  : 1985    MRN: [id number]  DATE: 2024  TIME: 10:23 AM    Assessment and Plan   Palpitations [R00.2]  1. Palpitations  ECG 12 lead        EKG- Normal sinus rhythm. Nonspecific ST and T wave abnormality. Appears chronic. No EKG available for comparison, recent stress test results reviewed.     EKG results reviewed with patient.  Discussed monitoring symptoms and follow-up with cardiology on Monday versus presenting to the ED.  Patient states she is comfortable with monitoring her symptoms and is aware to present to the ED if she develops any chest pain, worsening palpitations, shortness of breath, headache, dizziness, or other associated symptoms.    Patient Instructions       Follow up with PCP in 3-5 days.  Proceed to  ER if symptoms worsen.    If tests are performed, our office will contact you with results only if changes need to made to the care plan discussed with you at the visit. [facility]    Chief Complaint     Chief Complaint   Patient presents with    Palpitations     Started feeling them on Wednesday. Wednesday night patient took an allergy medication that she is aware gives heart palpitations took that medication once. Patient has a history of getting heart palpitations, just never lasting this length of time. Has been taking her usually dosages of her inhaler 2x a day and took one emergency inhaler dose on Thursday. Her last dosage was this morning.          History of Present Illness       Patient has history of open heart surgery at age 10, has been following with cardiology since.  She has had a recent stress test 2023 which was normal. She reports getting intermittent palpitations for her entire life but since taking Claritin 3 days ago they have been more frequent. She also used her albuterol 1 day ago which did not seem to have a large effect on the symptoms.   She notes mild chest tightness during episodes of palpitations but otherwise denies any associated symptoms.  She denies any chest pain, shortness of breath, dizziness, headache, or other associated symptoms.  She denies current palpitations at this time.    Palpitations  This is a new problem. The current episode started in the past 7 days. The problem occurs 2 to 4 times per day. The problem has been unchanged. Pertinent negatives include no abdominal pain, anorexia, chest pain, chills, congestion, coughing, fatigue, fever, headaches, myalgias, nausea, rash, sore throat or vomiting. Nothing aggravates the symptoms.       Review of Systems   Review of Systems   Constitutional:  Negative for chills, fatigue and fever.   HENT:  Negative for congestion, postnasal drip, rhinorrhea, sinus pressure, sore throat and trouble swallowing.    Respiratory:  Positive for chest tightness (during episodes). Negative for cough and shortness of breath.    Cardiovascular:  Positive for palpitations. Negative for chest pain.   Gastrointestinal:  Negative for abdominal pain, anorexia, nausea and vomiting.   Genitourinary:  Negative for difficulty urinating.   Musculoskeletal:  Negative for myalgias.   Skin:  Negative for rash.   Neurological:  Negative for dizziness and headaches.         Current Medications       Current Outpatient Medications:     albuterol (PROVENTIL HFA,VENTOLIN HFA) 90 mcg/act inhaler, INHALE 2 PUFFS EVERY 6 HOURS AS NEEDED FOR WHEEZING, Disp: 8.5 g, Rfl: 0    budesonide (Pulmicort Flexhaler) 90 MCG/ACT inhaler, Inhale 1 puff 2 (two) times a day Rinse mouth after use., Disp: 1 each, Rfl: 0    levothyroxine 125 mcg tablet, TAKE 1 TABLET BY MOUTH EVERY DAY, Disp: 90 tablet, Rfl: 0    Current Allergies     Allergies as of 07/27/2024 - Reviewed 07/27/2024   Allergen Reaction Noted    Bee venom  04/12/2016    Pollen extract  02/03/2020            The following portions of the patient's history were reviewed and  updated as appropriate: allergies, current medications, past family history, past medical history, past social history, past surgical history and problem list.     Past Medical History:   Diagnosis Date    Anxiety 4/12/2016    Cardiac disease     pulmonary stenosis    Depression 9/3/2020    Disease of thyroid gland     hypothyroidism    Lumbar back pain with radiculopathy affecting left lower extremity 9/3/2020    Lumbar back pain with radiculopathy affecting right lower extremity 9/3/2020    [place] syndrome     Pulmonary stenosis        Past Surgical History:   Procedure Laterality Date    CARDIAC SURGERY      pulmonary stenosis       Family History   Problem Relation Age of Onset    Thyroid disease Mother     Diabetes Father          Medications have been verified.        Objective   /61   Pulse 83   Temp 98.2 °F (36.8 °C)   Wt 51.5 kg (113 lb 9.6 oz)   SpO2 97%   BMI 22.94 kg/m²        Physical Exam     Physical Exam  Constitutional:       General: She is not in acute distress.     Appearance: She is not ill-appearing.   HENT:      Nose: Nose normal.      Mouth/Throat:      Mouth: Mucous membranes are moist.      Pharynx: Oropharynx is clear.   Eyes:      Pupils: Pupils are equal, round, and reactive to light.   Cardiovascular:      Rate and Rhythm: Normal rate and regular rhythm.      Pulses: Normal pulses.      Heart sounds: Murmur (Reports chronic) heard.      Systolic murmur is present.      No gallop.   Pulmonary:      Effort: Pulmonary effort is normal. No respiratory distress.      Breath sounds: Normal breath sounds. No wheezing.   Abdominal:      General: Abdomen is flat. Bowel sounds are normal. There is no distension.      Palpations: Abdomen is soft.      Tenderness: There is no abdominal tenderness.   Musculoskeletal:         General: Normal range of motion.      Cervical back: Normal range of motion.   Skin:     General: Skin is warm and dry.      Capillary Refill: Capillary refill takes  less than 2 seconds.   Neurological:      Mental Status: She is alert and oriented to person, place, and time.

## 2024-08-07 ENCOUNTER — NURSE TRIAGE (OUTPATIENT)
Age: 39
End: 2024-08-07

## 2024-08-07 NOTE — TELEPHONE ENCOUNTER
"Incoming call from patient. States she has been having heavy vaginal bleeding during period for the past few months, bloating that occurs before, during, and after cycle, and pain with intercourse and tampon use.     Patient is not currently having vaginal bleeding. LMP 7/25/24. Patient does have history of ovarian cyst which ruptured. Patient is not having abdominal pain at this time - only mild cramping with menses.     Bleeding precautions reviewed. Patient verbalized understanding. Patient scheduled for evaluation with first available appointment that worked with location, work schedule and menses.     Reason for Disposition   Patient wants to be seen    Answer Assessment - Initial Assessment Questions  1. AMOUNT: \"Describe the bleeding that you are having.\"     - SPOTTING: spotting, or pinkish / brownish mucous discharge; does not fill panti-liner or pad     - MILD:  less than 1 pad / hour; less than patient's usual menstrual bleeding    - MODERATE: 1-2 pads / hour; 1 menstrual cup every 6 hours; small-medium blood clots (e.g., pea, grape, small coin)    - SEVERE: soaking 2 or more pads/hour for 2 or more hours; 1 menstrual cup every 2 hours; bleeding not contained by pads or continuous red blood from vagina; large blood clots (e.g., golf ball, large coin)       Menses have been heavier - pain 4-5 days is heavy. Bleeding though tampon 1-2 hours. Past year has been worse. Pain with tampon insertion and pain with intercourse over 6 months has gotten worse.     2. ONSET: \"When did the bleeding begin?\" \"Is it continuing now?\"      No bleeding currently.     3. MENSTRUAL PERIOD: \"When was the last normal menstrual period?\" \"How is this different than your period?\"      7/25/24    4. REGULARITY: \"How regular are your periods?\"      Regular     5. ABDOMINAL PAIN: \"Do you have any pain?\" \"How bad is the pain?\"  (e.g., Scale 1-10; mild, moderate, or severe)    - MILD (1-3): doesn't interfere with normal activities, " "abdomen soft and not tender to touch     - MODERATE (4-7): interferes with normal activities or awakens from sleep, tender to touch     - SEVERE (8-10): excruciating pain, doubled over, unable to do any normal activities       Mild - bloating.     8. HORMONES: \"Are you taking any hormone medications, prescription or OTC?\" (e.g., birth control pills, estrogen)      Denies    9. BLOOD THINNERS: \"Do you take any blood thinners?\" (e.g., Coumadin/warfarin, Pradaxa/dabigatran, aspirin)      Denies    10. CAUSE: \"What do you think is causing the bleeding?\" (e.g., recent gyn surgery, recent gyn procedure; known bleeding disorder, cervical cancer, polycystic ovarian disease, fibroids)          History of ovarian cyst in past - ruptured     11. HEMODYNAMIC STATUS: \"Are you weak or feeling lightheaded?\" If Yes, ask: \"Can you stand and walk normally?\"         Denies lightheaded     12. OTHER SYMPTOMS: \"What other symptoms are you having with the bleeding?\" (e.g., passed tissue, vaginal discharge, fever, menstrual-type cramps)        Denies    Protocols used: Vaginal Bleeding - Abnormal-ADULT-[place]    "

## 2024-09-20 ENCOUNTER — OFFICE VISIT (OUTPATIENT)
Dept: URGENT CARE | Facility: CLINIC | Age: 39
End: 2024-09-20
Payer: COMMERCIAL

## 2024-09-20 VITALS
OXYGEN SATURATION: 99 % | SYSTOLIC BLOOD PRESSURE: 105 MMHG | TEMPERATURE: 97.8 F | DIASTOLIC BLOOD PRESSURE: 65 MMHG | RESPIRATION RATE: 18 BRPM | HEART RATE: 74 BPM

## 2024-09-20 DIAGNOSIS — T16.2XXA FOREIGN BODY OF LEFT EAR, INITIAL ENCOUNTER: Primary | ICD-10-CM

## 2024-09-20 PROCEDURE — 99214 OFFICE O/P EST MOD 30 MIN: CPT | Performed by: PHYSICAL MEDICINE & REHABILITATION

## 2024-09-20 PROCEDURE — 69200 CLEAR OUTER EAR CANAL: CPT | Performed by: PHYSICAL MEDICINE & REHABILITATION

## 2024-09-20 RX ORDER — FLUTICASONE PROPIONATE AND SALMETEROL 250; 50 UG/1; UG/1
POWDER RESPIRATORY (INHALATION)
COMMUNITY
Start: 2024-09-09

## 2024-09-20 NOTE — PROGRESS NOTES
[facility]        NAME: Katherin Barragan is a 38 y.o. female  : 1985    MRN: [id number]  DATE: 2024  TIME: 9:40 AM    Assessment and Plan   Foreign body of left ear, initial encounter [T16.2XXA]  1. Foreign body of left ear, initial encounter              Patient Instructions       Follow up with PCP in 3-5 days.  Proceed to  ER if symptoms worsen.    If tests are performed, our office will contact you with results only if changes need to made to the care plan discussed with you at the visit. [facility]    Chief Complaint     Chief Complaint   Patient presents with    Earache     Patient with left ear pain since this morning.          History of Present Illness       Patient presenting with left ear pain. Symptoms started today, 24. She states she was cleaning her ears with a q-tip when the end broke off and was left behind in the left ear.    Earache         Review of Systems   Review of Systems   Constitutional: Negative.    HENT:  Positive for ear pain.    Respiratory: Negative.     Cardiovascular: Negative.    Gastrointestinal: Negative.    Musculoskeletal: Negative.          Current Medications       Current Outpatient Medications:     albuterol (PROVENTIL HFA,VENTOLIN HFA) 90 mcg/act inhaler, INHALE 2 PUFFS EVERY 6 HOURS AS NEEDED FOR WHEEZING, Disp: 8.5 g, Rfl: 0    levothyroxine 125 mcg tablet, TAKE 1 TABLET BY MOUTH EVERY DAY, Disp: 90 tablet, Rfl: 0    budesonide (Pulmicort Flexhaler) 90 MCG/ACT inhaler, Inhale 1 puff 2 (two) times a day Rinse mouth after use., Disp: 1 each, Rfl: 0    Fluticasone-Salmeterol (Advair) 250-50 mcg/dose inhaler, , Disp: , Rfl:     Current Allergies     Allergies as of 2024 - Reviewed 2024   Allergen Reaction Noted    Bee venom  2016    Pollen extract  2020            The following portions of the patient's history were reviewed and updated as appropriate: allergies,  current medications, past family history, past medical history, past social history, past surgical history and problem list.     Past Medical History:   Diagnosis Date    Anxiety 4/12/2016    Cardiac disease     pulmonary stenosis    Depression 9/3/2020    Disease of thyroid gland     hypothyroidism    Lumbar back pain with radiculopathy affecting left lower extremity 9/3/2020    Lumbar back pain with radiculopathy affecting right lower extremity 9/3/2020    [place] syndrome     Pulmonary stenosis        Past Surgical History:   Procedure Laterality Date    CARDIAC SURGERY      pulmonary stenosis       Family History   Problem Relation Age of Onset    Thyroid disease Mother     Diabetes Father          Medications have been verified.        Objective   /65   Pulse 74   Temp 97.8 °F (36.6 °C)   Resp 18   SpO2 99%        Physical Exam     Physical Exam  Vitals reviewed.   Constitutional:       General: She is not in acute distress.     Appearance: She is not ill-appearing.   HENT:      Left Ear: A foreign body is present.      Ears:      Comments: Q-tip cotton noted to left ear     Mouth/Throat:      Mouth: Mucous membranes are moist.      Pharynx: Oropharynx is clear.   Cardiovascular:      Rate and Rhythm: Normal rate and regular rhythm.      Pulses: Normal pulses.      Heart sounds: Normal heart sounds.   Pulmonary:      Effort: Pulmonary effort is normal. No respiratory distress.      Breath sounds: Normal breath sounds.   Neurological:      Mental Status: She is alert.             Universal Protocol:  procedure performed by consultantRisks and benefits: risks, benefits and alternatives were discussed  Consent given by: patient  Patient understanding: patient states understanding of the procedure being performed  Patient consent: the patient's understanding of the procedure matches consent given  Patient identity confirmed: verbally with patient  Foreign body removal    Date/Time: 9/20/2024 9:30  AM    Performed by: Indira Etienne PA-C  Authorized by: Indira Etienne PA-C  Body area: ear  Location details: left ear  Localization method: visualized and magnification  Removal mechanism: curette and forceps  1 objects recovered.  Objects recovered: q-tip cotton tip  Post-procedure assessment: foreign body removed  Patient tolerance: patient tolerated the procedure well with no immediate complications

## 2024-09-24 ENCOUNTER — OFFICE VISIT (OUTPATIENT)
Dept: OBGYN CLINIC | Facility: CLINIC | Age: 39
End: 2024-09-24
Payer: COMMERCIAL

## 2024-09-24 VITALS
DIASTOLIC BLOOD PRESSURE: 66 MMHG | HEIGHT: 59 IN | SYSTOLIC BLOOD PRESSURE: 104 MMHG | BODY MASS INDEX: 23.14 KG/M2 | WEIGHT: 114.8 LBS

## 2024-09-24 DIAGNOSIS — N92.0 MENORRHAGIA WITH REGULAR CYCLE: Primary | ICD-10-CM

## 2024-09-24 DIAGNOSIS — N94.6 DYSMENORRHEA: ICD-10-CM

## 2024-09-24 DIAGNOSIS — R14.0 BLOATING: ICD-10-CM

## 2024-09-24 PROCEDURE — 99203 OFFICE O/P NEW LOW 30 MIN: CPT | Performed by: PHYSICIAN ASSISTANT

## 2024-09-24 NOTE — PROGRESS NOTES
Assessment/Plan:      Diagnoses and all orders for this visit:    Menorrhagia with regular cycle  -     US pelvis complete w transvaginal; Future    Dysmenorrhea  -     US pelvis complete w transvaginal; Future    Bloating  -     US pelvis complete w transvaginal; Future     30-year-old female new patient presenting today to establish and for further recommendations regarding definitive management of chronic menorrhagia and dysmenorrhea as well as newer symptom of bloating past few months.    Patient reports symptoms chronic and unchanged and no one has ever been able to figure out the reason why she has heavy painful menstruation and she reports being tried on several hormonal medications all of which have caused side effects or have been ineffective.  She declines trial of any other hormonal's at this time and desires definitive management.  She also verbalizes concern for possible endometriosis.    Patient declined pelvic and vaginal exam today secondary to currently being on menstruation and is very heavy but at baseline.  She denies risk for STD as she has not been sexually active in over 2 years.  Last Pap smear 2020 was normal  Pelvic ultrasound in 2022 and 2021 were both unremarkable.    Counseled patient regarding causes to chronic dysmenorrhea and menorrhagia.  Patient desired definitive management and consideration for surgery for which I will have her schedule consultation with Dr. Coronado to discuss further management options.  Will have patient obtain updated pelvic ultrasound prior.  Discussed with patient importance of managing her hypothyroidism and compliance with testing and medication.  Patient does not feel her thyroid condition is related to her menstrual issues being that the menorrhagia was pre-existing to the thyroid however I discussed how uncontrolled thyroid condition can affect hormones and menstruation.  She is agreeable to completing thyroid blood work as ordered by her PCP and  following closely with them.  Would recommend consideration for updated annual exam and Pap smear after workup.    Chief Complaint   Patient presents with    Pelvic Pain     Pt states that she has heavy bleeding pain during her period and pain during intercourse.          Subjective:     Patient ID: Katherin Barragan is a 38 y.o. female.    39y/o F here today for many chronic GYN symptoms. States she has had issues with heavy/painful periods since age 14.  States no one has ever been able to figure out why.  She desires second opinion and to discuss alternative non-hormonal management options.  She denies any acute changes to her menstruation.   She is concerned about endometriosis.     She reports chronic issues with heavy periods and chronic pain.   She states her cycles are generally regular once a month.  States sometimes her periods are 7-9 days in length and periods can vary starting a few days earlier or a few days later, but never weeks early or late and denies prolonged bleeding.  Bleeding is always moderate to heavy sometimes changing pad/tampon q 1-2 hrs.  She has chronic dysmenorrhea and sometimes feel sick with them.  Denies irregular bleeding or intermenstrual bleeding.  She notes she has stomach bloating past few months, and has already seen GI for endoscopy/colonoscopy and was told nothing is wrong from GI standpoint.     She does not desire any hormonal BC and also didn't like the way she felt on it.  She states she has tried IUD ( not sure which one) but had it taken out b/c of causing increased pelvic pain.   She has done depo provera causing horrible mood swings.  She tried nuvaring and many OCP's all of which didn't help her.   Does not want nexplanon.    She desires surgical management.  Does not want any children.     She has not been sexually active in 2 years.  Denies urinary or vaginal sxs.  States when she had been sexually active in the past it has been painful.     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 cardiology, iron deficiency anemia, anxiety and depression, vitamin D deficiency and [place] syndrome.  She also has hypothyroidism for which she is taking levothyroxine however she admits to chronic noncompliance forgetting to take medication intermittently.  Her last TSH was 9.389 in August 2023 with normal free T4 of 0.92.  There is still an active order for repeat thyroid studies from December 2023 that have not been completed, current state of hypothyroidism unknown.        Review of Systems   Constitutional: Negative.    Gastrointestinal:  Negative for abdominal pain, blood in stool, constipation, diarrhea, nausea and vomiting.        Abdominal bloating   Genitourinary:  Positive for dyspareunia, menstrual problem and pelvic pain. Negative for difficulty urinating, dysuria, frequency, hematuria, vaginal bleeding, vaginal discharge and vaginal pain.   Psychiatric/Behavioral:          Stable anxiety/depression         The following portions of the patient's history were reviewed and updated as appropriate: allergies, current medications, past family history, past medical history, past social history, past surgical history, and problem list.      Objective:     Physical Exam  Vitals reviewed.   Constitutional:       Appearance: Normal appearance.   Cardiovascular:      Rate and Rhythm: Normal rate and regular rhythm.      Pulses: Normal pulses.      Heart sounds: Murmur (2/6 systolic) heard.   Pulmonary:      Effort: Pulmonary effort is normal.      Breath sounds: Normal breath sounds.   Abdominal:      General: Abdomen is flat. There is no distension.      Palpations: Abdomen is soft.      Tenderness: There is no abdominal tenderness. There is no guarding or rebound.   Genitourinary:     Comments: Pt declined vaginal/pelvic exam as she currently has heavy menstruation.   Musculoskeletal:       Cervical back: Neck supple.   Neurological:      Mental Status: She is alert and oriented to person, place, and time.   Psychiatric:         Mood and Affect: Mood normal.         Behavior: Behavior normal.

## 2024-10-05 ENCOUNTER — APPOINTMENT (OUTPATIENT)
Dept: LAB | Facility: CLINIC | Age: 39
End: 2024-10-05
Payer: COMMERCIAL

## 2024-10-05 DIAGNOSIS — E06.3 HYPOTHYROIDISM DUE TO HASHIMOTO'S THYROIDITIS: ICD-10-CM

## 2024-10-05 LAB
T4 FREE SERPL-MCNC: 0.91 NG/DL (ref 0.61–1.12)
TSH SERPL DL<=0.05 MIU/L-ACNC: 12.91 UIU/ML (ref 0.45–4.5)

## 2024-10-05 PROCEDURE — 84443 ASSAY THYROID STIM HORMONE: CPT

## 2024-10-05 PROCEDURE — 36415 COLL VENOUS BLD VENIPUNCTURE: CPT

## 2024-10-05 PROCEDURE — 84439 ASSAY OF FREE THYROXINE: CPT

## 2024-10-07 DIAGNOSIS — E06.3 HYPOTHYROIDISM DUE TO HASHIMOTO THYROIDITIS: Primary | ICD-10-CM

## 2024-10-07 RX ORDER — LEVOTHYROXINE SODIUM 150 UG/1
150 TABLET ORAL
Qty: 100 TABLET | Refills: 3 | Status: SHIPPED | OUTPATIENT
Start: 2024-10-07 | End: 2024-10-08

## 2024-10-08 DIAGNOSIS — E06.3 HYPOTHYROIDISM DUE TO HASHIMOTO THYROIDITIS: Primary | ICD-10-CM

## 2024-10-08 RX ORDER — LEVOTHYROXINE SODIUM 150 MCG
150 TABLET ORAL DAILY
Qty: 90 TABLET | Refills: 0 | Status: SHIPPED | OUTPATIENT
Start: 2024-10-08 | End: 2025-01-06

## 2024-10-10 ENCOUNTER — OFFICE VISIT (OUTPATIENT)
Dept: OBGYN CLINIC | Facility: CLINIC | Age: 39
End: 2024-10-10
Payer: COMMERCIAL

## 2024-10-10 VITALS
BODY MASS INDEX: 22.86 KG/M2 | DIASTOLIC BLOOD PRESSURE: 60 MMHG | WEIGHT: 113.4 LBS | SYSTOLIC BLOOD PRESSURE: 112 MMHG | HEIGHT: 59 IN

## 2024-10-10 DIAGNOSIS — N92.0 MENORRHAGIA WITH REGULAR CYCLE: Primary | ICD-10-CM

## 2024-10-10 PROCEDURE — 88305 TISSUE EXAM BY PATHOLOGIST: CPT | Performed by: STUDENT IN AN ORGANIZED HEALTH CARE EDUCATION/TRAINING PROGRAM

## 2024-10-10 PROCEDURE — 58100 BIOPSY OF UTERUS LINING: CPT | Performed by: OBSTETRICS & GYNECOLOGY

## 2024-10-10 NOTE — PROGRESS NOTES
Assessment/Plan:     There are no diagnoses linked to this encounter.      39-year-old female  Menorrhagia/dysmenorrhea  Hypothyroid  Anemia  [place] Syndrome  Had open heart surgery with pulmonary stenosis as a child  Currently not sexually active  Plan  Trial of tranexamic acid risk-benefit side effect reviewed discussed with patient  Office EMB done today  Repeat pelvic ultrasound  Return to office in 3 months for follow-up patient requesting surgical management if symptoms did not improve with tranexamic acid      Subjective:      Patient ID: Katherin Barragan is a 39 y.o. female.    HPI  38 yo female presents to the office today to discuss management options for her menorrhagia/anemia/dyspareunia  Nullip   (14x regx 7-9) heavy for 5 days     PMH hypothyroid , anemia and low bp ,  PSH manny synd, open heart surgery sec to pul stenosis no medcs f/w outpatient with cardio   Not sexually active   Try OCP and nuva ring also depo and  IUD has side effect and not help much with her period   No OCP since 5 y     Here today request to be check for endo   Pain with periods, cramping before and during also after her period   Bloating all the time had colonoscpy told everything was ok     UTERUS:  The uterus is anteverted in position, measuring 9.0 x 4.1 x 6.1 cm.   The uterus has a normal contour and echotexture.  The cervix appears within normal limits.     ENDOMETRIUM:    The endometrial echo complex has an AP caliber of 7.0 mm.  Its appearance is within normal limits for age and cycle and shows no filling defects..     OVARIES/ADNEXA:  Right ovary:  3.2 x 2.5 x 2.1 cm. 8.9 mL  No suspicious right ovarian abnormality.  Doppler flow within normal limits.     Left ovary:  3.9 x 2.6 x 1.4 cm. 7.7 mL  No suspicious left ovarian abnormality.  Doppler flow within normal limits.     No suspicious adnexal mass or loculated collections.  There is no free fluid.     IMPRESSION:     Normal.    Patient try medical management with  "no improvement in her symptoms patient is requesting surgical management as her bleeding and anemia pain at the time of the menses affecting quality of life option given for tranexamic acid, OCP, progesterone only method, reviewed and discussed with patient patient to try OCP and progesterone only method with no improvement patient is willing to try tranexamic acid but she is still request to proceed with hysterectomy if medication did not help with her symptoms  Recommend to proceed with office endometrial biopsy today she can return to office in 3 months for follow-up    The following portions of the patient's history were reviewed and updated as appropriate: allergies, current medications, past family history, past medical history, past social history, past surgical history and problem list.    Review of Systems      Objective:      /60 (BP Location: Left arm, Patient Position: Sitting, Cuff Size: Adult)   Ht 4' 11\" (1.499 m)   Wt 51.4 kg (113 lb 6.4 oz)   LMP 09/22/2024   BMI 22.90 kg/m²          Physical Exam    Endometrial biopsy    Date/Time: 10/10/2024 9:45 AM    Performed by: Luz Coronado MD  Authorized by: Luz Coronado MD  Universal Protocol:  Consent: Verbal consent obtained. Written consent obtained.  Risks and benefits: risks, benefits and alternatives were discussed  Consent given by: patient  Time out: Immediately prior to procedure a \"time out\" was called to verify the correct patient, procedure, equipment, support staff and site/side marked as required.  Patient understanding: patient states understanding of the procedure being performed  Patient consent: the patient's understanding of the procedure matches consent given  Procedure consent: procedure consent matches procedure scheduled  Relevant documents: relevant documents present and verified  Patient identity confirmed: verbally with patient    Pre-procedure:     Premeds:  Acetaminophen  Procedure:     Procedure: endometrial biopsy " with Pipelle      A bivalve speculum was placed in the vagina: yes      Cervix cleaned and prepped: yes      The cervix was dilated: no      Uterus sounded: no      Specimen collected: specimen collected and sent to pathology      Patient tolerated procedure well with no complications: yes    Findings:     Uterus size:  Non-gravid    Cervix: normal      Adnexa: normal

## 2024-10-15 ENCOUNTER — TELEPHONE (OUTPATIENT)
Dept: OBGYN CLINIC | Facility: CLINIC | Age: 39
End: 2024-10-15

## 2024-10-15 PROCEDURE — 88305 TISSUE EXAM BY PATHOLOGIST: CPT | Performed by: STUDENT IN AN ORGANIZED HEALTH CARE EDUCATION/TRAINING PROGRAM

## 2024-10-15 NOTE — TELEPHONE ENCOUNTER
----- Message from Luz Coronado MD sent at 10/10/2024  2:49 PM EDT -----  Robotic TLH bilateral salpingectomy 3/5/2025    [facility] GYN Department  Surgery Scheduling Sheet    Patient Name: Katherin Barragan  : 1985    Provider: Luz Coronado MD     Needed: no; Language: N/A    Procedure: exam under anesthesia and robotic total laparoscopic hysterectomy, bilateral salpingectomy and cystoscopy    Diagnosis: Menorrhagia/dysmenorrhea/anemia    Special Needs or Equipment: none    Anesthesia: General anesthesia    Length of stay: outpatient  Does patient have comorbid conditions that will require close perioperative monitoring prior to safe discharge: no    -The patient has comorbid conditions that will require close perioperative monitoring prior to safe discharge, including N/A.   -This may require acute care beyond the usual and routine recovery period. As such, inpatient admission post-operatively is expected and appropriate, and anticipated hospital length of stay will be >2 midnights.    Pre-Admission Testing Needed: yes   Labs that should be ordered: cbc, hgb A1C, type and screen, urine pregnancy test    Order PAT that is recommended in prep for procedure?: Yes    Medical Clearance Needed: no; Provider: N/A    MA Form Signed (tubals/hysterectomy): Not Indicated    Surgical Drink Given: no , to be given at pre op appt    How many days out of work: 8 week(s)     How many days no drivin week(s)       Is pre op appt needed?  yes  Interval for post op appt: 2 week(s)     For Surgical Scheduler:     Surgery Scheduled On: . 25-MORNING  [place]: [facility]    Pre-op Appt: 25  Post op Appt: TO BE SCHEDULED ONCE ARRIVED FOR PRE OP APPT  Consult/Medical clearance appt: N/A

## 2024-11-12 ENCOUNTER — OFFICE VISIT (OUTPATIENT)
Dept: FAMILY MEDICINE CLINIC | Facility: CLINIC | Age: 39
End: 2024-11-12
Payer: COMMERCIAL

## 2024-11-12 VITALS
BODY MASS INDEX: 22.98 KG/M2 | HEIGHT: 59 IN | OXYGEN SATURATION: 99 % | SYSTOLIC BLOOD PRESSURE: 117 MMHG | TEMPERATURE: 98.8 F | HEART RATE: 78 BPM | DIASTOLIC BLOOD PRESSURE: 68 MMHG | WEIGHT: 114 LBS

## 2024-11-12 DIAGNOSIS — Q24.9 CONGENITAL HEART DISEASE: ICD-10-CM

## 2024-11-12 DIAGNOSIS — Z13.1 SCREENING FOR DIABETES MELLITUS: ICD-10-CM

## 2024-11-12 DIAGNOSIS — R09.89 PULMONARY AIR TRAPPING: ICD-10-CM

## 2024-11-12 DIAGNOSIS — Q87.19 NOONAN'S SYNDROME: ICD-10-CM

## 2024-11-12 DIAGNOSIS — E06.3 HYPOTHYROIDISM DUE TO HASHIMOTO THYROIDITIS: Primary | ICD-10-CM

## 2024-11-12 DIAGNOSIS — Z00.00 HEALTH MAINTENANCE EXAMINATION: ICD-10-CM

## 2024-11-12 DIAGNOSIS — Z11.1 SCREENING FOR TUBERCULOSIS: ICD-10-CM

## 2024-11-12 DIAGNOSIS — Z13.220 SCREENING FOR LIPID DISORDERS: ICD-10-CM

## 2024-11-12 DIAGNOSIS — R05.3 CHRONIC COUGH: ICD-10-CM

## 2024-11-12 PROCEDURE — 99395 PREV VISIT EST AGE 18-39: CPT | Performed by: PHYSICIAN ASSISTANT

## 2024-11-12 PROCEDURE — 99214 OFFICE O/P EST MOD 30 MIN: CPT | Performed by: PHYSICIAN ASSISTANT

## 2024-11-12 PROCEDURE — 86580 TB INTRADERMAL TEST: CPT

## 2024-11-12 NOTE — PROGRESS NOTES
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 Reviewed HM and labs, appropriate orders placed       Screening for diabetes mellitus    Orders:    Hemoglobin A1C; Future    Pulmonary air trapping  As below, continue prn rani. Mostly asymptomatic at this time  Orders:    Angiotensin converting enzyme; Future    Alpha-1-antitrypsin; Future    Chronic cough    Orders:    Angiotensin converting enzyme; Future    Alpha-1-antitrypsin; Future    Hypothyroidism due to Hashimoto thyroiditis  Recheck tsh and continue levothyroxine             History of Present Illness     Pt presents for annual physical, follow up  Hx of hypothyroid, due for repeat TSH, last TSH was high and levothyroxine adjusted  Hx of filemon syndrome and pulmonic stenosis s/p repair, remains asymptomatic and follows annual with cardiology   Hx of air trapping, chronic cough, family mentioned about testing alpha 1 antitrypsin and ACE levels. She no longer needs her maintenance inhaler and has minimal sx  She will be getting a hysterectomy in 3/2025 for dysmenorrhea  No mood concerns today  FH reviewed  Interval hx reviewed  Not smoking  No abuse/misuse of alcohol or illicit drugs  Meds reviewed  Allergies reviewed       Review of Systems   Constitutional:  Negative for chills, fatigue and fever.   HENT:  Negative for congestion, ear pain, hearing loss, nosebleeds, postnasal drip, rhinorrhea, sinus pressure, sinus pain, sneezing and sore  throat.    Eyes:  Negative for pain, discharge, itching and visual disturbance.   Respiratory:  Negative for cough, chest tightness, shortness of breath and wheezing.    Cardiovascular:  Negative for chest pain, palpitations and leg swelling.   Gastrointestinal:  Negative for abdominal pain, blood in stool, constipation, diarrhea, nausea and vomiting.   Genitourinary:  Positive for menstrual problem. Negative for frequency and urgency.   Neurological:  Negative for dizziness, light-headedness and numbness.     Past Medical History:   Diagnosis Date    Anemia     Anxiety 04/12/2016    Cardiac disease     pulmonary stenosis    Depression 09/03/2020    Disease of thyroid gland     hypothyroidism    Hypothyroidism     Lumbar back pain with radiculopathy affecting left lower extremity 09/03/2020    Lumbar back pain with radiculopathy affecting right lower extremity 09/03/2020    [place] syndrome     Pulmonary stenosis      Past Surgical History:   Procedure Laterality Date    BREAST BIOPSY      CARDIAC SURGERY      pulmonary stenosis     Family History   Problem Relation Age of Onset    Thyroid disease Mother     Asthma Mother         As a child    Diabetes Father     Stroke Father      Social History     Tobacco Use    Smoking status: Former     Current packs/day: 0.50     Average packs/day: 0.5 packs/day for 5.0 years (2.5 ttl pk-yrs)     Types: Cigarettes    Smokeless tobacco: Never   Vaping Use    Vaping status: Never Used   Substance and Sexual Activity    Alcohol use: Yes     Comment: social    Drug use: Never    Sexual activity: Not Currently     Partners: Male     Birth control/protection: Abstinence, None     Current Outpatient Medications on File Prior to Visit   Medication Sig    albuterol (PROVENTIL HFA,VENTOLIN HFA) 90 mcg/act inhaler INHALE 2 PUFFS EVERY 6 HOURS AS NEEDED FOR WHEEZING    Synthroid 150 MCG tablet Take 1 tablet (150 mcg total) by mouth daily    [DISCONTINUED] budesonide (Pulmicort Flexhaler)  "90 MCG/ACT inhaler Inhale 1 puff 2 (two) times a day Rinse mouth after use. (Patient not taking: Reported on 10/10/2024)    [DISCONTINUED] Fluticasone-Salmeterol (Advair) 250-50 mcg/dose inhaler      Allergies   Allergen Reactions    Bee Venom     Pollen Extract      Immunization History   Administered Date(s) Administered    COVID-19 J&J (FilesX) vaccine 0.5 mL 04/06/2021    Influenza, injectable, quadrivalent, preservative free 0.5 mL 10/02/2020, 10/12/2021    Tdap 01/27/2017    Tuberculin Skin Test-PPD Intradermal 10/02/2020     Objective     /68   Pulse 78   Temp 98.8 °F (37.1 °C)   Ht 4' 11\" (1.499 m)   Wt 51.7 kg (114 lb)   SpO2 99%   BMI 23.03 kg/m²     Physical Exam  Vitals and nursing note reviewed.   Constitutional:       General: She is not in acute distress.     Appearance: She is well-developed.   HENT:      Head: Normocephalic and atraumatic.   Eyes:      Conjunctiva/sclera: Conjunctivae normal.   Cardiovascular:      Rate and Rhythm: Normal rate and regular rhythm.      Heart sounds: No murmur heard.  Pulmonary:      Effort: Pulmonary effort is normal. No respiratory distress.      Breath sounds: Normal breath sounds. No wheezing, rhonchi or rales.   Musculoskeletal:         General: No swelling.      Cervical back: Neck supple.   Skin:     General: Skin is warm and dry.      Capillary Refill: Capillary refill takes less than 2 seconds.   Neurological:      Mental Status: She is alert.   Psychiatric:         Mood and Affect: Mood normal.         "

## 2024-11-14 ENCOUNTER — CLINICAL SUPPORT (OUTPATIENT)
Dept: FAMILY MEDICINE CLINIC | Facility: CLINIC | Age: 39
End: 2024-11-14

## 2024-11-14 DIAGNOSIS — Z11.1 SCREENING FOR TUBERCULOSIS: Primary | ICD-10-CM

## 2024-11-14 LAB
INDURATION: 0 MM
TB SKIN TEST: NEGATIVE

## 2024-11-14 PROCEDURE — NURSE

## 2024-11-14 NOTE — PROGRESS NOTES
"Name: Katherin Barragan      : 1985      MRN: [id number]  Encounter Provider: Tanesha Castro DO  Encounter Date: 11/15/2024   Encounter department: [facility]  :  Assessment & Plan  Lipoma of torso  Exam suggestive of lipoma -- will US to confirm and then consider removal with general surgery if bothersome   Orders:    US superficial lump (non extremity); Future           History of Present Illness     HPI    Pt presents due mass on her back overlying L scapula -- first noted two days ago  However, pt has been having some burning and discomfort in this area for a few weeks so initially thought it was a \"knot\" from tension     Review of Systems   Musculoskeletal:         (+) mass on back     Medical History Reviewed by provider this encounter:     .     Objective   There were no vitals taken for this visit.     Physical Exam  Vitals and nursing note reviewed.   Constitutional:       General: She is not in acute distress.     Appearance: Normal appearance.   Pulmonary:      Effort: Pulmonary effort is normal. No respiratory distress.   Skin:         Neurological:      Mental Status: She is alert.      Comments: Grossly intact   Psychiatric:         Mood and Affect: Mood normal.         "

## 2024-11-15 ENCOUNTER — OFFICE VISIT (OUTPATIENT)
Dept: FAMILY MEDICINE CLINIC | Facility: CLINIC | Age: 39
End: 2024-11-15
Payer: COMMERCIAL

## 2024-11-15 VITALS
WEIGHT: 110.4 LBS | TEMPERATURE: 98.7 F | OXYGEN SATURATION: 98 % | DIASTOLIC BLOOD PRESSURE: 68 MMHG | HEIGHT: 59 IN | SYSTOLIC BLOOD PRESSURE: 104 MMHG | BODY MASS INDEX: 22.26 KG/M2 | HEART RATE: 72 BPM

## 2024-11-15 DIAGNOSIS — D17.1 LIPOMA OF TORSO: Primary | ICD-10-CM

## 2024-11-15 PROCEDURE — 99213 OFFICE O/P EST LOW 20 MIN: CPT | Performed by: FAMILY MEDICINE

## 2024-11-16 ENCOUNTER — APPOINTMENT (OUTPATIENT)
Dept: LAB | Facility: CLINIC | Age: 39
End: 2024-11-16
Payer: COMMERCIAL

## 2024-11-16 DIAGNOSIS — R09.89 PULMONARY AIR TRAPPING: ICD-10-CM

## 2024-11-16 DIAGNOSIS — Z13.1 SCREENING FOR DIABETES MELLITUS: ICD-10-CM

## 2024-11-16 DIAGNOSIS — R05.3 CHRONIC COUGH: ICD-10-CM

## 2024-11-16 DIAGNOSIS — Z13.220 SCREENING FOR LIPID DISORDERS: ICD-10-CM

## 2024-11-16 DIAGNOSIS — E06.3 HYPOTHYROIDISM DUE TO HASHIMOTO THYROIDITIS: ICD-10-CM

## 2024-11-16 LAB
CHOLEST SERPL-MCNC: 171 MG/DL (ref ?–200)
EST. AVERAGE GLUCOSE BLD GHB EST-MCNC: 114 MG/DL
HBA1C MFR BLD: 5.6 %
HDLC SERPL-MCNC: 42 MG/DL
LDLC SERPL CALC-MCNC: 117 MG/DL (ref 0–100)
TRIGL SERPL-MCNC: 61 MG/DL (ref ?–150)
TSH SERPL DL<=0.05 MIU/L-ACNC: 1.8 UIU/ML (ref 0.45–4.5)

## 2024-11-16 PROCEDURE — 82103 ALPHA-1-ANTITRYPSIN TOTAL: CPT

## 2024-11-16 PROCEDURE — 80061 LIPID PANEL: CPT

## 2024-11-16 PROCEDURE — 36415 COLL VENOUS BLD VENIPUNCTURE: CPT

## 2024-11-16 PROCEDURE — 84443 ASSAY THYROID STIM HORMONE: CPT

## 2024-11-16 PROCEDURE — 83036 HEMOGLOBIN GLYCOSYLATED A1C: CPT

## 2024-11-16 PROCEDURE — 82164 ANGIOTENSIN I ENZYME TEST: CPT

## 2024-11-17 ENCOUNTER — HOSPITAL ENCOUNTER (OUTPATIENT)
Dept: ULTRASOUND IMAGING | Facility: HOSPITAL | Age: 39
Discharge: HOME | End: 2024-11-17
Payer: COMMERCIAL

## 2024-11-17 ENCOUNTER — RESULTS FOLLOW-UP (OUTPATIENT)
Dept: FAMILY MEDICINE CLINIC | Facility: CLINIC | Age: 39
End: 2024-11-17

## 2024-11-17 DIAGNOSIS — N92.0 MENORRHAGIA WITH REGULAR CYCLE: ICD-10-CM

## 2024-11-17 PROCEDURE — 76856 US EXAM PELVIC COMPLETE: CPT

## 2024-11-17 PROCEDURE — 76830 TRANSVAGINAL US NON-OB: CPT

## 2024-11-18 LAB
A1AT SERPL-MCNC: 147 MG/DL (ref 100–188)
ACE SERPL-CCNC: 42 U/L (ref 14–82)

## 2024-11-22 ENCOUNTER — RESULTS FOLLOW-UP (OUTPATIENT)
Dept: OBGYN CLINIC | Facility: CLINIC | Age: 39
End: 2024-11-22

## 2024-12-01 ENCOUNTER — HOSPITAL ENCOUNTER (OUTPATIENT)
Dept: ULTRASOUND IMAGING | Facility: HOSPITAL | Age: 39
Discharge: HOME | End: 2024-12-01
Attending: FAMILY MEDICINE
Payer: COMMERCIAL

## 2024-12-01 DIAGNOSIS — D17.1 LIPOMA OF TORSO: ICD-10-CM

## 2024-12-01 PROCEDURE — 76705 ECHO EXAM OF ABDOMEN: CPT

## 2024-12-05 ENCOUNTER — RESULTS FOLLOW-UP (OUTPATIENT)
Dept: FAMILY MEDICINE CLINIC | Facility: CLINIC | Age: 39
End: 2024-12-05

## 2024-12-05 DIAGNOSIS — D17.1 LIPOMA OF TORSO: Primary | ICD-10-CM

## 2024-12-26 ENCOUNTER — ANNUAL EXAM (OUTPATIENT)
Dept: OBGYN CLINIC | Facility: CLINIC | Age: 39
End: 2024-12-26
Payer: COMMERCIAL

## 2024-12-26 VITALS
BODY MASS INDEX: 22.86 KG/M2 | HEIGHT: 59 IN | DIASTOLIC BLOOD PRESSURE: 58 MMHG | SYSTOLIC BLOOD PRESSURE: 120 MMHG | WEIGHT: 113.4 LBS

## 2024-12-26 DIAGNOSIS — Z01.419 ENCOUNTER FOR GYNECOLOGICAL EXAMINATION WITHOUT ABNORMAL FINDING: Primary | ICD-10-CM

## 2024-12-26 DIAGNOSIS — Z01.419 WOMEN'S ANNUAL ROUTINE GYNECOLOGICAL EXAMINATION: ICD-10-CM

## 2024-12-26 PROCEDURE — G0476 HPV COMBO ASSAY CA SCREEN: HCPCS | Performed by: OBSTETRICS & GYNECOLOGY

## 2024-12-26 PROCEDURE — G0145 SCR C/V CYTO,THINLAYER,RESCR: HCPCS | Performed by: OBSTETRICS & GYNECOLOGY

## 2024-12-26 PROCEDURE — S0612 ANNUAL GYNECOLOGICAL EXAMINA: HCPCS | Performed by: OBSTETRICS & GYNECOLOGY

## 2024-12-26 NOTE — PROGRESS NOTES
Subjective      Katherin Barragan is a 39 y.o. female who presents for annual well woman exam. Periods are regular every 28-30 days, lasting 7 days. No intermenstrual bleeding, spotting, or discharge.  3-4 DAYS VERY HEAVY   Current contraception: none      PMH hypothyroid , anemia and low bp ,  PSH manny synd, open heart surgery sec to pul stenosis no medcs f/w outpatient with cardio   Not sexually active   Try OCP and nuva ring also depo and  IUD has side effect and not help much with her period   No OCP since 5 y          History of abnormal Pap smear: no  Family history of uterine or ovarian cancer: no  Family history of breast cancer: no    Menstrual History:  OB History          0    Para   0    Term   0       0    AB   0    Living   0         SAB   0    IAB   0    Ectopic   0    Multiple   0    Live Births   0                  Patient's last menstrual period was 2024.  Period Cycle (Days): 28  Period Duration (Days): 7-9  Period Pattern: Regular  Menstrual Flow: Heavy, Moderate, Light  Dysmenorrhea: (!) Moderate  Dysmenorrhea Symptoms: Cramping    The following portions of the patient's history were reviewed and updated as appropriate: allergies, current medications, past family history, past medical history, past social history, past surgical history, and problem list.    Review of Systems  Review of Systems   Constitutional:  Negative for activity change, appetite change, chills, fatigue and fever.   Respiratory:  Negative for apnea, cough, chest tightness and shortness of breath.    Cardiovascular:  Negative for chest pain, palpitations and leg swelling.   Gastrointestinal:  Negative for abdominal pain, constipation, diarrhea, nausea and vomiting.   Genitourinary:  Negative for difficulty urinating, dysuria, flank pain, frequency, hematuria and urgency.   Neurological:  Negative for dizziness, seizures, syncope, light-headedness, numbness and headaches.   Psychiatric/Behavioral:   "Negative for agitation and confusion.           Objective      /58 (BP Location: Left arm, Patient Position: Sitting, Cuff Size: Adult)   Ht 4' 11\" (1.499 m)   Wt 51.4 kg (113 lb 6.4 oz)   LMP 12/18/2024   BMI 22.90 kg/m²     Physical Exam  Physical Exam  Cardiovascular:      Heart sounds: Murmur heard.          General:   alert and oriented, in no acute distress, alert, appears stated age, and cooperative   Heart: regular rate and rhythm, S1, S2 normal, no murmur, click, rub or gallop   Lungs: clear to auscultation bilaterally   Abdomen: soft, non-tender, without masses or organomegaly   Vulva: normal   Vagina: normal mucosa, normal discharge   Cervix: no cervical motion tenderness and no lesions   Uterus: normal size   Adnexa:  Breast Exam:  normal adnexa  breasts appear normal, no suspicious masses, no skin or nipple changes or axillary nodes.                Assessment      @well woman@ .   39-year-old female  Annual exam   Menorrhagia/dysmenorrhea  Hypothyroid  Anemia  [place] Syndrome  Had open heart surgery with pulmonary stenosis as a child  Currently not sexually active  Plan  Pap/HPV  Diet/exercise calcium/vitamin D  Desire definitive management for menorrhagia/dysmenorrhea that is impacting quality of life  Return to office for preop consult  To  repeat ultrasound in 3 months follow-up on ovarian cyst   All questions answered.     There are no Patient Instructions on file for this visit.     "

## 2024-12-28 ENCOUNTER — RESULTS FOLLOW-UP (OUTPATIENT)
Dept: OBGYN CLINIC | Facility: CLINIC | Age: 39
End: 2024-12-28

## 2024-12-31 LAB
LAB AP GYN PRIMARY INTERPRETATION: NORMAL
Lab: NORMAL

## 2025-01-08 ENCOUNTER — CONSULT (OUTPATIENT)
Dept: SURGERY | Facility: CLINIC | Age: 40
End: 2025-01-08
Payer: COMMERCIAL

## 2025-01-08 VITALS
DIASTOLIC BLOOD PRESSURE: 62 MMHG | HEART RATE: 77 BPM | SYSTOLIC BLOOD PRESSURE: 118 MMHG | TEMPERATURE: 98.1 F | BODY MASS INDEX: 22.66 KG/M2 | RESPIRATION RATE: 18 BRPM | OXYGEN SATURATION: 98 % | HEIGHT: 59 IN | WEIGHT: 112.4 LBS

## 2025-01-08 DIAGNOSIS — D17.1 LIPOMA OF TORSO: ICD-10-CM

## 2025-01-08 PROCEDURE — 99203 OFFICE O/P NEW LOW 30 MIN: CPT | Performed by: STUDENT IN AN ORGANIZED HEALTH CARE EDUCATION/TRAINING PROGRAM

## 2025-01-08 RX ORDER — CHLORHEXIDINE GLUCONATE 40 MG/ML
SOLUTION TOPICAL DAILY PRN
OUTPATIENT
Start: 2025-01-08

## 2025-01-08 NOTE — PROGRESS NOTES
Name: Katherin Barragan      : 1985      MRN: [id number]  Encounter Provider: Forest Jerry DO  Encounter Date: 2025   Encounter department: [facility]  :  Assessment & Plan  Lipoma of torso  39-year-old female with lipoma of left upper back  -Patient notes a mass on her left upper back  -Does cause her intermittent discomfort with palpation and sometimes into her left arm  -Denies any infection or drainage  -Lipoma of left upper back in the subcutaneous tissue roughly 6 x 4 cm  -Primary care physician note from 11/15/2024 reviewed  -Ultrasound of the subcutaneous tissue from 2024 report and images reviewed  -Plan for excision of left upper back lipoma under local anesthesia    All risks, benefits, alternatives of the procedure were discussed at length with the patient.  Risks include bleeding, infection, damage to surrounding structures, recurrence.  All questions were answered to satisfaction.  The patient voiced understanding and signed consent.    Orders:    Ambulatory referral to General Surgery    Case request operating room: EXCISION  BIOPSY LESION/MASS BACK; Standing        History of Present Illness   HPI  Katherin Barragan is a 39 y.o. female who presents for evaluation of left upper back mass.  Patient states she recently noticed a mass on her left upper back.  It does cause her intermittent discomfort with movement and pressure.  Denies any growth since she noticed it.  Denies any infection.  History obtained from: patient    Review of Systems   Constitutional:  Negative for chills, fatigue and fever.   HENT:  Negative for congestion, hearing loss, rhinorrhea and sore throat.    Eyes:  Negative for pain and discharge.   Respiratory:  Negative for cough, chest tightness and shortness of breath.    Cardiovascular:  Negative for chest pain and palpitations.   Gastrointestinal:  Negative for abdominal pain, constipation, diarrhea, nausea and vomiting.    Endocrine: Negative for cold intolerance and heat intolerance.   Genitourinary:  Negative for difficulty urinating and dysuria.   Musculoskeletal:  Negative for back pain and neck pain.   Skin:  Negative for color change and rash.        Positive back mass   Allergic/Immunologic: Positive for environmental allergies. Negative for food allergies.   Neurological:  Negative for seizures and headaches.   Hematological:  Does not bruise/bleed easily.   Psychiatric/Behavioral:  Negative for confusion and hallucinations.      Medical History Reviewed by provider this encounter:  Tobacco  Allergies  Meds  Problems  Med Hx  Surg Hx  Fam Hx     .  Past Medical History   Past Medical History:   Diagnosis Date    Anemia     Anxiety 04/12/2016    Cardiac disease     pulmonary stenosis    Depression 09/03/2020    Disease of thyroid gland     hypothyroidism    Hypothyroidism     Lumbar back pain with radiculopathy affecting left lower extremity 09/03/2020    Lumbar back pain with radiculopathy affecting right lower extremity 09/03/2020    [place] syndrome     Pulmonary stenosis      Past Surgical History:   Procedure Laterality Date    BREAST BIOPSY Left     CARDIAC SURGERY      pulmonary stenosis    WISDOM TOOTH EXTRACTION       Family History   Problem Relation Age of Onset    Thyroid disease Mother     Asthma Mother         As a child    Diabetes Father     Stroke Father       reports that she has quit smoking. Her smoking use included cigarettes. She has a 2.5 pack-year smoking history. She has never used smokeless tobacco. She reports current alcohol use. She reports that she does not use drugs.  Current Outpatient Medications on File Prior to Visit   Medication Sig Dispense Refill    albuterol (PROVENTIL HFA,VENTOLIN HFA) 90 mcg/act inhaler INHALE 2 PUFFS EVERY 6 HOURS AS NEEDED FOR WHEEZING 8.5 g 0    Synthroid 150 MCG tablet Take 1 tablet (150 mcg total) by mouth daily 90 tablet 0     No current  "facility-administered medications on file prior to visit.     Allergies   Allergen Reactions    Bee Venom     Pollen Extract       Current Outpatient Medications on File Prior to Visit   Medication Sig Dispense Refill    albuterol (PROVENTIL HFA,VENTOLIN HFA) 90 mcg/act inhaler INHALE 2 PUFFS EVERY 6 HOURS AS NEEDED FOR WHEEZING 8.5 g 0    Synthroid 150 MCG tablet Take 1 tablet (150 mcg total) by mouth daily 90 tablet 0     No current facility-administered medications on file prior to visit.      Social History     Tobacco Use    Smoking status: Former     Current packs/day: 0.50     Average packs/day: 0.5 packs/day for 5.0 years (2.5 ttl pk-yrs)     Types: Cigarettes    Smokeless tobacco: Never   Vaping Use    Vaping status: Never Used   Substance and Sexual Activity    Alcohol use: Yes     Comment: social    Drug use: Never    Sexual activity: Not Currently     Partners: Male     Birth control/protection: Abstinence, None        Objective   /62 (BP Location: Left arm, Patient Position: Sitting, Cuff Size: Standard)   Pulse 77   Temp 98.1 °F (36.7 °C)   Resp 18   Ht 4' 11\" (1.499 m)   Wt 51 kg (112 lb 6.4 oz)   LMP 12/18/2024   SpO2 98%   BMI 22.70 kg/m²      Physical Exam  Constitutional:       Appearance: Normal appearance.   HENT:      Head: Normocephalic and atraumatic.      Nose: Nose normal.   Eyes:      General: No scleral icterus.     Conjunctiva/sclera: Conjunctivae normal.   Cardiovascular:      Rate and Rhythm: Normal rate and regular rhythm.      Heart sounds: Murmur heard.   Pulmonary:      Effort: Pulmonary effort is normal.      Breath sounds: Normal breath sounds.   Musculoskeletal:         General: No signs of injury.   Skin:     General: Skin is warm.      Coloration: Skin is not jaundiced.      Comments: Lipoma of left upper back in the subcutaneous tissue roughly 6 x 4 cm   Neurological:      General: No focal deficit present.      Mental Status: She is alert and oriented to " person, place, and time.   Psychiatric:         Mood and Affect: Mood normal.         Behavior: Behavior normal.

## 2025-01-08 NOTE — ASSESSMENT & PLAN NOTE
39-year-old female with lipoma of left upper back  -Patient notes a mass on her left upper back  -Does cause her intermittent discomfort with palpation and sometimes into her left arm  -Denies any infection or drainage  -Lipoma of left upper back in the subcutaneous tissue roughly 6 x 4 cm  -Primary care physician note from 11/15/2024 reviewed  -Ultrasound of the subcutaneous tissue from 12/1/2024 report and images reviewed  -Plan for excision of left upper back lipoma under local anesthesia    All risks, benefits, alternatives of the procedure were discussed at length with the patient.  Risks include bleeding, infection, damage to surrounding structures, recurrence.  All questions were answered to satisfaction.  The patient voiced understanding and signed consent.    Orders:    Ambulatory referral to General Surgery    Case request operating room: EXCISION  BIOPSY LESION/MASS BACK; Standing

## 2025-01-14 ENCOUNTER — OFFICE VISIT (OUTPATIENT)
Dept: OBGYN CLINIC | Facility: CLINIC | Age: 40
End: 2025-01-14
Payer: COMMERCIAL

## 2025-01-14 ENCOUNTER — PREP FOR PROCEDURE (OUTPATIENT)
Dept: OBGYN CLINIC | Facility: CLINIC | Age: 40
End: 2025-01-14

## 2025-01-14 VITALS — HEIGHT: 59 IN | BODY MASS INDEX: 22.58 KG/M2 | WEIGHT: 112 LBS

## 2025-01-14 DIAGNOSIS — N92.0 MENORRHAGIA WITH REGULAR CYCLE: ICD-10-CM

## 2025-01-14 DIAGNOSIS — N94.6 DYSMENORRHEA: Primary | ICD-10-CM

## 2025-01-14 PROCEDURE — 99213 OFFICE O/P EST LOW 20 MIN: CPT | Performed by: OBSTETRICS & GYNECOLOGY

## 2025-01-14 NOTE — PROGRESS NOTES
Assessment/Plan:     There are no diagnoses linked to this encounter.       39-year-old female  Menorrhagia/dysmenorrhea  Hypothyroid  Anemia  [place] Syndrome  Had open heart surgery with pulmonary stenosis as a child  Currently not sexually active  Plan  Pap/HPV negative  Diet/exercise calcium/vitamin D  Desire definitive management for menorrhagia/dysmenorrhea that is impacting quality of life  Patient desired to proceed with definitive management as her menorrhagia and dysmenorrhea is affecting her quality of life we will proceed with robotic total laparoscopic hysterectomy bilateral salpingectomy exam under anesthesia with cystoscopy and ICG injection  Procedure explained discussed with patient all patient questions answered and patient was satisfied    Subjective:      Patient ID: Katherin Barragan is a 39 y.o. female.    HPI  39-year-old female present to the office today to discuss management option for her menorrhagia/dysmenorrhea that is affecting her quality of life  Menses lasted for 7 days 4 of those days are very heavy passing clots and tissue  Patient has anemia with low blood pressure  Patient tried medical management with no improvement    Try OCP and nuva ring also depo and  IUD has side effect and not help much with her period   No OCP since 5 y     Patient is requesting definitive management as her symptom is affecting her quality of life and desire to proceed with hysterectomy procedure explained and discussed with patient risk of bleeding, infection, blood transfusion, risk of bladder injury bowel injury urinary tract injury needing another surgery risk of anesthesia all explained and discussed with patient patient aware of all the risk and desire to proceed with robotic total laparoscopic hysterectomy bilateral salpingectomy cystoscopy and ICG injection will be performed as well  Secondary to the medical condition cardiac clearance is recommended preprocedure      The following portions of the  "patient's history were reviewed and updated as appropriate: allergies, current medications, past family history, past medical history, past social history, past surgical history and problem list.    Review of Systems      Objective:      Ht 4' 11\" (1.499 m)   Wt 50.8 kg (112 lb)   LMP 12/18/2024   BMI 22.62 kg/m²     UTERUS:  The uterus is anteverted in position, measuring 8.3 x 4.0 x 5.2 cm.  The uterus has a normal contour and echotexture.  The cervix appears within normal limits.     ENDOMETRIUM:  The endometrial echo complex has an AP caliber of 15.0 mm, previously 7 mm in June 2022, 4 mm in June 2021.  The endometrium is thickened and mildly heterogeneous measuring 15 mm. Echogenic nodular focus within the canal measures 5 mm and may represent a polyp versus overall heterogeneity of the tissue. Some internal vascularity favors the possibility of a   polyp.     OVARIES/ADNEXA:  Right ovary: 3.4 x 2.3 x 2.2 cm. 9.2 mL.  Ovarian Doppler flow is within normal limits.  No suspicious ovarian or adnexal abnormality. Involuting 1.5 cm cyst appears to be present in the right ovary with mild complexity. No ovarian lesion was reported on ultrasound in October.     Left ovary: 3.7 x 2.0 x 1.5 cm. 5.8 mL.  Ovarian Doppler flow is within normal limits.  No suspicious ovarian or adnexal abnormality.     OTHER:  No free fluid or loculated fluid collections.     IMPRESSION:     Endometrial thickening with probable underlying polypoid changes.     Although this could be related to phase of menstruation, further evaluation with hysterosonography is advised given patient's persistent symptomatology. Correlation with prior biopsy results is also advised.     Complex small cyst in the right ovary is probably involuting corpus luteum. A follow-up in 2-3 menstrual cycles could be considered.     Physical Exam      General:   alert and oriented, in no acute distress, alert, appears stated age, and cooperative   Heart: regular rate " and rhythm, S1, S2 normal, no murmur, click, rub or gallop   Lungs: clear to auscultation bilaterally   Abdomen: soft, non-tender, without masses or organomegaly   Vulva: normal   Vagina: normal mucosa, normal discharge   Cervix: no cervical motion tenderness and no lesions   Uterus: normal size   Adnexa:  :  normal adnexA

## 2025-01-17 NOTE — ASSESSMENT & PLAN NOTE
The patient's goals for the shift include      The clinical goals for the shift include Patient will have BS within normal limits during shift      Problem: Nutrition  Goal: Less than 5 days NPO/clear liquids  Outcome: Progressing  Goal: Oral intake greater than 50%  Outcome: Progressing  Goal: Oral intake greater 75%  Outcome: Progressing  Goal: Adequate PO fluid intake  Outcome: Progressing  Goal: Nutrition support goals are met within 48 hrs  Outcome: Progressing  Goal: Nutrition support is meeting 75% of nutrient needs  Outcome: Progressing  Goal: BG  mg/dL  Outcome: Progressing  Goal: Lab values WNL  Outcome: Progressing  Goal: Electrolytes WNL  Outcome: Progressing     Problem: Pain - Adult  Goal: Verbalizes/displays adequate comfort level or baseline comfort level  Outcome: Progressing     Problem: Safety - Adult  Goal: Free from fall injury  Outcome: Progressing     Problem: Discharge Planning  Goal: Discharge to home or other facility with appropriate resources  Outcome: Progressing     Problem: Chronic Conditions and Co-morbidities  Goal: Patient's chronic conditions and co-morbidity symptoms are monitored and maintained or improved  Outcome: Progressing     Problem: Diabetes  Goal: Achieve decreasing blood glucose levels by end of shift  Outcome: Progressing  Goal: Increase stability of blood glucose readings by end of shift  Outcome: Progressing  Goal: Decrease in ketones present in urine by end of shift  Outcome: Progressing  Goal: Maintain electrolyte levels within acceptable range throughout shift  Outcome: Progressing  Goal: Maintain glucose levels >70mg/dl to <250mg/dl throughout shift  Outcome: Progressing  Goal: No changes in neurological exam by end of shift  Outcome: Progressing  Goal: Learn about and adhere to nutrition recommendations by end of shift  Outcome: Progressing  Goal: Vital signs within normal range for age by end of shift  Outcome: Progressing  Goal: Increase self care and/or  This patient presents for contraceptive counseling  We discussed all options at length including barrier methods, combination estrogen progesterone methods (pill, patch and ring), progesterone only methods (pill, Depo, Nexplanon and IUD) and non-hormonal methods (paragard, NFP, sterilization)  We reviewed directions for use, Ses/AEs, risks and benefits  All questions were answered  She is aware that condoms will be advised with all sexual contact for prevention of STI  The patient's personal and FH were reviewed and she is without risk factors for VTE  The patient requests Depo provera  Recommended RTO for dose #1 with menses   She agrees with plan and will use condoms PRN in the meantime family involovement by end of shift  Outcome: Progressing  Goal: Receive DSME education by end of shift  Outcome: Progressing     Problem: Fall/Injury  Goal: Not fall by end of shift  Outcome: Progressing  Goal: Be free from injury by end of the shift  Outcome: Progressing  Goal: Verbalize understanding of personal risk factors for fall in the hospital  Outcome: Progressing  Goal: Verbalize understanding of risk factor reduction measures to prevent injury from fall in the home  Outcome: Progressing  Goal: Use assistive devices by end of the shift  Outcome: Progressing  Goal: Pace activities to prevent fatigue by end of the shift  Outcome: Progressing

## 2025-01-26 ENCOUNTER — OFFICE VISIT (OUTPATIENT)
Dept: URGENT CARE | Facility: CLINIC | Age: 40
End: 2025-01-26
Payer: COMMERCIAL

## 2025-01-26 VITALS
DIASTOLIC BLOOD PRESSURE: 58 MMHG | OXYGEN SATURATION: 100 % | HEART RATE: 105 BPM | BODY MASS INDEX: 22.62 KG/M2 | TEMPERATURE: 99.7 F | SYSTOLIC BLOOD PRESSURE: 98 MMHG | WEIGHT: 112 LBS

## 2025-01-26 DIAGNOSIS — R50.9 FEVER, UNSPECIFIED: ICD-10-CM

## 2025-01-26 DIAGNOSIS — J06.9 ACUTE URI: Primary | ICD-10-CM

## 2025-01-26 PROCEDURE — 99213 OFFICE O/P EST LOW 20 MIN: CPT

## 2025-01-26 PROCEDURE — 87636 SARSCOV2 & INF A&B AMP PRB: CPT

## 2025-01-26 NOTE — PROGRESS NOTES
[facility]    NAME: Katherin Barragan is a 39 y.o. female  : 1985    MRN: [id number]  DATE: 2025  TIME: 10:29 AM    Assessment and Plan   Acute URI [J06.9]  1. Acute URI  Covid/Flu- Office Collect Normal      2. Fever, unspecified  Covid/Flu- Office Collect Normal          Follow up with primary care in 3-5 days.  Go to ER if symptoms get worse.     Patient Instructions       Go see your regular family doctor in 3-5 days.  Go to emergency room (ER) if you are getting worse.     Chief Complaint     Chief Complaint   Patient presents with    Cold Like Symptoms     Pt is here for headaches, body aches, cough, fever that started yesterday. States her fever was 101 today. She is taking nyquil is giving her some relief.          History of Present Illness       Presents with sick symptoms including headaches, body aches, cough, and fever that started yesterday. States her fever was 101 today. She is taking nyquil with some relief. She does work at a  with multiple sick contact Flu, RSV, and strep.         Review of Systems   Review of Systems   Constitutional:  Positive for fever. Negative for chills.   HENT:  Positive for sore throat. Negative for congestion.    Respiratory:  Positive for cough. Negative for shortness of breath.    Cardiovascular:  Negative for chest pain.   Gastrointestinal:  Negative for abdominal pain.   Musculoskeletal:  Positive for myalgias.   Neurological:  Positive for headaches.   Psychiatric/Behavioral:  Negative for confusion.          Current Medications       Current Outpatient Medications:     albuterol (PROVENTIL HFA,VENTOLIN HFA) 90 mcg/act inhaler, INHALE 2 PUFFS EVERY 6 HOURS AS NEEDED FOR WHEEZING, Disp: 8.5 g, Rfl: 0    Synthroid 150 MCG tablet, Take 1 tablet (150 mcg total) by mouth daily, Disp: 90 tablet, Rfl: 0    Current Allergies     Allergies as of 2025 - Reviewed 2025   Allergen Reaction Noted    Bee venom  2016    Pollen  extract  02/03/2020            The following portions of the patient's history were reviewed and updated as appropriate: allergies, current medications, past family history, past medical history, past social history, past surgical history and problem list.     Past Medical History:   Diagnosis Date    Anemia     Anxiety 04/12/2016    Cardiac disease     pulmonary stenosis    Depression 09/03/2020    Disease of thyroid gland     hypothyroidism    Hypothyroidism     Lumbar back pain with radiculopathy affecting left lower extremity 09/03/2020    Lumbar back pain with radiculopathy affecting right lower extremity 09/03/2020    Spreckels syndrome     Pulmonary stenosis        Past Surgical History:   Procedure Laterality Date    BREAST BIOPSY Left     CARDIAC SURGERY      pulmonary stenosis    WISDOM TOOTH EXTRACTION         Family History   Problem Relation Age of Onset    Thyroid disease Mother     Asthma Mother         As a child    Diabetes Father     Stroke Father          Medications have been verified.        Objective   BP 98/58 (Patient Position: Sitting, Cuff Size: Standard)   Pulse 105   Temp 99.7 °F (37.6 °C) (Temporal)   Wt 50.8 kg (112 lb)   SpO2 100%   BMI 22.62 kg/m²        Physical Exam     Physical Exam  Vitals reviewed.   Constitutional:       General: She is not in acute distress.     Appearance: Normal appearance.   HENT:      Right Ear: Tympanic membrane, ear canal and external ear normal.      Left Ear: Tympanic membrane, ear canal and external ear normal.      Nose: Nose normal.      Mouth/Throat:      Mouth: Mucous membranes are moist.      Pharynx: No posterior oropharyngeal erythema.      Tonsils: No tonsillar exudate. 1+ on the right. 1+ on the left.   Eyes:      Conjunctiva/sclera: Conjunctivae normal.   Cardiovascular:      Rate and Rhythm: Normal rate and regular rhythm.      Pulses: Normal pulses.      Heart sounds: Normal heart sounds. No murmur heard.  Pulmonary:      Effort:  Pulmonary effort is normal. No respiratory distress.      Breath sounds: Normal breath sounds.   Skin:     General: Skin is warm and dry.   Neurological:      General: No focal deficit present.      Mental Status: She is alert and oriented to person, place, and time.   Psychiatric:         Mood and Affect: Mood normal.         Behavior: Behavior normal.

## 2025-01-26 NOTE — LETTER
January 26, 2025     Patient: Katherin Barragan   YOB: 1985   Date of Visit: 1/26/2025       To Whom It May Concern:    It is my medical opinion that Katherin Barragan should be excused 1/28/25.     If you have any questions or concerns, please don't hesitate to call.         Sincerely,        AGBINO Ward    CC: No Recipients

## 2025-01-26 NOTE — LETTER
January 26, 2025     Patient: Katherin Barragan   YOB: 1985   Date of Visit: 1/26/2025       To Whom it May Concern:    Katherin Barragan was seen in my clinic on 1/26/2025. She should be excused 1/26 and 1/27/25.     If you have any questions or concerns, please don't hesitate to call.         Sincerely,          GABINO Ward        CC: No Recipients

## 2025-02-05 ENCOUNTER — TELEPHONE (OUTPATIENT)
Age: 40
End: 2025-02-05

## 2025-02-05 DIAGNOSIS — N83.201 RIGHT OVARIAN CYST: Primary | ICD-10-CM

## 2025-02-05 NOTE — TELEPHONE ENCOUNTER
Patient scheduled for surgery with Dr. Coronado on 3/5/25. Patient states in November had ultrasound - provider requesting another ultrasound prior to surgery via result note. Patient calling in today to request ultrasound order to be placed.     Patient states she also has appointment with cardiologist through [facility] tomorrow for pre surgical clearance.

## 2025-02-07 ENCOUNTER — HOSPITAL ENCOUNTER (OUTPATIENT)
Facility: HOSPITAL | Age: 40
Setting detail: OUTPATIENT SURGERY
Discharge: HOME | End: 2025-02-07
Attending: STUDENT IN AN ORGANIZED HEALTH CARE EDUCATION/TRAINING PROGRAM | Admitting: STUDENT IN AN ORGANIZED HEALTH CARE EDUCATION/TRAINING PROGRAM
Payer: COMMERCIAL

## 2025-02-07 VITALS
BODY MASS INDEX: 22.53 KG/M2 | HEART RATE: 75 BPM | SYSTOLIC BLOOD PRESSURE: 106 MMHG | OXYGEN SATURATION: 100 % | HEIGHT: 59 IN | DIASTOLIC BLOOD PRESSURE: 63 MMHG | WEIGHT: 111.77 LBS | TEMPERATURE: 97.8 F | RESPIRATION RATE: 18 BRPM

## 2025-02-07 DIAGNOSIS — D17.1 LIPOMA OF TORSO: ICD-10-CM

## 2025-02-07 LAB
EXT PREGNANCY TEST URINE: NEGATIVE
EXT. CONTROL: NORMAL

## 2025-02-07 PROCEDURE — NC001 PR NO CHARGE: Performed by: STUDENT IN AN ORGANIZED HEALTH CARE EDUCATION/TRAINING PROGRAM

## 2025-02-07 PROCEDURE — 88304 TISSUE EXAM BY PATHOLOGIST: CPT | Performed by: STUDENT IN AN ORGANIZED HEALTH CARE EDUCATION/TRAINING PROGRAM

## 2025-02-07 PROCEDURE — 81025 URINE PREGNANCY TEST: CPT | Performed by: STUDENT IN AN ORGANIZED HEALTH CARE EDUCATION/TRAINING PROGRAM

## 2025-02-07 PROCEDURE — 12032 INTMD RPR S/A/T/EXT 2.6-7.5: CPT | Performed by: STUDENT IN AN ORGANIZED HEALTH CARE EDUCATION/TRAINING PROGRAM

## 2025-02-07 PROCEDURE — 11406 EXC TR-EXT B9+MARG >4.0 CM: CPT | Performed by: STUDENT IN AN ORGANIZED HEALTH CARE EDUCATION/TRAINING PROGRAM

## 2025-02-07 RX ORDER — CEFAZOLIN SODIUM 2 G/50ML
2000 SOLUTION INTRAVENOUS ONCE
Status: COMPLETED | OUTPATIENT
Start: 2025-02-07 | End: 2025-02-07

## 2025-02-07 RX ORDER — CHLORHEXIDINE GLUCONATE 40 MG/ML
SOLUTION TOPICAL DAILY PRN
Status: DISCONTINUED | OUTPATIENT
Start: 2025-02-07 | End: 2025-02-07 | Stop reason: HOSPADM

## 2025-02-07 RX ORDER — ACETAMINOPHEN 325 MG/1
650 TABLET ORAL EVERY 6 HOURS PRN
Status: DISCONTINUED | OUTPATIENT
Start: 2025-02-07 | End: 2025-02-07 | Stop reason: HOSPADM

## 2025-02-07 RX ORDER — LIDOCAINE HYDROCHLORIDE AND EPINEPHRINE 10; 10 MG/ML; UG/ML
INJECTION, SOLUTION INFILTRATION; PERINEURAL AS NEEDED
Status: DISCONTINUED | OUTPATIENT
Start: 2025-02-07 | End: 2025-02-07 | Stop reason: HOSPADM

## 2025-02-07 RX ADMIN — CEFAZOLIN SODIUM 2000 MG: 2 SOLUTION INTRAVENOUS at 09:31

## 2025-02-07 NOTE — H&P
H&P Exam - General Surgery   Katherin Barragan 39 y.o. female MRN: [id number]  Unit/Bed#: OR POOL Encounter: [id number]    Assessment & Plan     Katherin Barragan is a 39 y.o. female    Lipoma of torso  39-year-old female with lipoma of left upper back  -Patient presents for excision of upper back lipoma  -Denies any pain  -Denies any changes  -Plan for excision of left upper back lipoma under local anesthesia    History of Present Illness   HPI:  Katherin Barragan is a 39 y.o. female who presents for excision of left upper back lipoma.  She denies any pain.  She denies any changes.    Review of Systems  Constitutional:  Negative for chills, fatigue and fever.   HENT:  Negative for congestion, hearing loss, rhinorrhea and sore throat.    Eyes:  Negative for pain and discharge.   Respiratory:  Negative for cough, chest tightness and shortness of breath.    Cardiovascular:  Negative for chest pain and palpitations.   Gastrointestinal:  Negative for abdominal pain, constipation, diarrhea, nausea and vomiting.   Endocrine: Negative for cold intolerance and heat intolerance.   Genitourinary:  Negative for difficulty urinating and dysuria.   Musculoskeletal:  Negative for back pain and neck pain.   Skin:  Negative for color change and rash.        Positive back mass   Allergic/Immunologic: Positive for environmental allergies. Negative for food allergies.   Neurological:  Negative for seizures and headaches.   Hematological:  Does not bruise/bleed easily.   Psychiatric/Behavioral:  Negative for confusion and hallucinations.     Historical Information   Past Medical History:   Diagnosis Date    Anemia     Anxiety 04/12/2016    Cardiac disease     pulmonary stenosis    Depression 09/03/2020    Disease of thyroid gland     hypothyroidism    Hypothyroidism     Lumbar back pain with radiculopathy affecting left lower extremity 09/03/2020    Lumbar back pain with radiculopathy affecting right lower extremity 09/03/2020     "Filemon syndrome     Pulmonary stenosis      Past Surgical History:   Procedure Laterality Date    BREAST BIOPSY Left     CARDIAC SURGERY      pulmonary stenosis    WISDOM TOOTH EXTRACTION       Social History   Social History     Substance and Sexual Activity   Alcohol Use Yes    Comment: social     Social History     Substance and Sexual Activity   Drug Use Never     Social History     Tobacco Use   Smoking Status Former    Current packs/day: 0.50    Average packs/day: 0.5 packs/day for 5.0 years (2.5 ttl pk-yrs)    Types: Cigarettes   Smokeless Tobacco Never     Family History: Family history non-contributory    Meds/Allergies   all medications and allergies reviewed  Allergies   Allergen Reactions    Bee Venom     Pollen Extract        Objective   First Vitals:   Blood Pressure: 116/82 (02/07/25 0845)  Pulse: 68 (02/07/25 0845)  Temperature: 97.8 °F (36.6 °C) (02/07/25 0845)  Temp Source: Temporal (02/07/25 0845)  Respirations: 18 (02/07/25 0845)  Height: 4' 11\" (149.9 cm) (02/07/25 0845)  Weight - Scale: 50.7 kg (111 lb 12.4 oz) (02/07/25 0845)  SpO2: 100 % (02/07/25 0845)    Current Vitals:   Blood Pressure: 116/82 (02/07/25 0845)  Pulse: 68 (02/07/25 0845)  Temperature: 97.8 °F (36.6 °C) (02/07/25 0845)  Temp Source: Temporal (02/07/25 0845)  Respirations: 18 (02/07/25 0845)  Height: 4' 11\" (149.9 cm) (02/07/25 0845)  Weight - Scale: 50.7 kg (111 lb 12.4 oz) (02/07/25 0845)  SpO2: 100 % (02/07/25 0845)    No intake or output data in the 24 hours ending 02/07/25 0935    Invasive Devices       Peripheral Intravenous Line  Duration             Peripheral IV 02/07/25 Dorsal (posterior);Right Hand <1 day                    Physical Exam  Constitutional:       Appearance: Normal appearance.   HENT:      Head: Normocephalic and atraumatic.      Nose: Nose normal.   Eyes:      General: No scleral icterus.     Conjunctiva/sclera: Conjunctivae normal.   Cardiovascular:      Rate and Rhythm: Normal rate  Pulmonary:      " Effort: Pulmonary effort is normal.   Musculoskeletal:         General: No signs of injury.   Skin:     General: Skin is warm.      Coloration: Skin is not jaundiced.      Comments: Lipoma of left upper back in the subcutaneous tissue roughly 6 x 4 cm   Neurological:      General: No focal deficit present.      Mental Status: She is alert and oriented to person, place, and time.   Psychiatric:         Mood and Affect: Mood normal.         Behavior: Behavior normal.     Lab Results: I have personally reviewed pertinent lab results.    Recent Results (from the past 36 hours)   POCT pregnancy, urine    Collection Time: 02/07/25  8:52 AM   Result Value Ref Range    EXT Preg Test, Ur Negative Negative    Control Valid Valid

## 2025-02-07 NOTE — OP NOTE
OPERATIVE REPORT  PATIENT NAME: Katherin Barragan    :  1985  MRN: [id number]  Pt Location: [facility] OR ROOM 02    SURGERY DATE: 2025    Surgeons and Role:     * Forest Jerry DO - Primary    Preop Diagnosis:  Lipoma of torso [D17.1]    Post-Op Diagnosis Codes:     * Lipoma of torso [D17.1]    Procedure(s):  Left - EXCISION  BIOPSY LESION/MASS BACK    Specimen(s):  ID Type Source Tests Collected by Time Destination   1 : LEFT BACK MASS Tissue Soft Tissue, Other TISSUE EXAM Forest Jerry DO 2025 1011        Estimated Blood Loss:   Minimal    Drains:  * No LDAs found *    Anesthesia Type:   Local    Operative Indications:  Lipoma of torso [D17.1]    Operative Findings:  Left back lipoma specimen size 8.5 x 4 x 1.5 cm, incision closure 7 cm  Dissection was carried down into the subcutaneous tissue and did not go any deeper  Specimen was excised with grossly negative margins, no measurable margins were taken    Complications:   None    Procedure and Technique:  The patient was seen again in Preoperative Holding.  All the risks, benefits, complications, treatment options, and expected outcomes were discussed with the patient and family at length. All questions were answered to satisfaction. There was concurrence with the proposed plan and informed consent was obtained. The site of surgery was properly noted/marked. The patient was taken to Operating Room, identified, and the procedure verified as excision of left back mass.    The patient was placed in the right lateral decubitus position on the stretcher.  The patient had received preoperative antibiotics and SCDs placed on the bilateral lower extremities.    The left upper back was then prepped and draped in the usual sterile fashion using ChloraPrep.  A Time-Out was then performed with all involved present confirming the correct patient, procedure, antibiotics, and any additional concerns.     The area around the lipoma was anesthetized using  1% lidocaine with epinephrine.  Using #15 blade a longitudinal incision was made over the length of the mass and dissection was carried down into the subcutaneous tissue.  The lipoma was then excised with grossly negative margins.  The cavity was inspected and hemostasis was ensured with electrocautery.  The cavity was then irrigated.  Interrupted 3-0 Vicryl was used to approximate the deep dermal tissue.  Running 4-0 Monocryl was used to close the skin.    The incision was then cleansed and dried and Steri-Strips, 4 x 4, Tegaderm was placed as dressing    All instrument, sponge, and needle counts were noted to be correct at the conclusion of the case.  The patient was brought to the PACU in stable and satisfactory condition.     I was present for the entire procedure. and A qualified resident physician was not available.    Patient Disposition:  APU and hemodynamically stable    SIGNATURE: Forest Jerry DO  DATE: February 7, 2025  TIME: 10:29 AM

## 2025-02-10 PROCEDURE — 88304 TISSUE EXAM BY PATHOLOGIST: CPT | Performed by: STUDENT IN AN ORGANIZED HEALTH CARE EDUCATION/TRAINING PROGRAM

## 2025-02-15 ENCOUNTER — APPOINTMENT (OUTPATIENT)
Dept: LAB | Facility: CLINIC | Age: 40
End: 2025-02-15
Payer: COMMERCIAL

## 2025-02-15 DIAGNOSIS — D50.0 IRON DEFICIENCY ANEMIA DUE TO CHRONIC BLOOD LOSS: ICD-10-CM

## 2025-02-15 DIAGNOSIS — Z01.812 ENCOUNTER FOR PRE-OPERATIVE LABORATORY TESTING: ICD-10-CM

## 2025-02-15 DIAGNOSIS — N94.6 DYSMENORRHEA: ICD-10-CM

## 2025-02-15 DIAGNOSIS — Z01.818 OTHER SPECIFIED PRE-OPERATIVE EXAMINATION: ICD-10-CM

## 2025-02-15 DIAGNOSIS — N92.0 MENORRHAGIA WITH REGULAR CYCLE: ICD-10-CM

## 2025-02-15 LAB
BASOPHILS # BLD AUTO: 0.06 THOUSANDS/ΜL (ref 0–0.1)
BASOPHILS NFR BLD AUTO: 1 % (ref 0–1)
EOSINOPHIL # BLD AUTO: 0.39 THOUSAND/ΜL (ref 0–0.61)
EOSINOPHIL NFR BLD AUTO: 9 % (ref 0–6)
ERYTHROCYTE [DISTWIDTH] IN BLOOD BY AUTOMATED COUNT: 18.9 % (ref 11.6–15.1)
EST. AVERAGE GLUCOSE BLD GHB EST-MCNC: 117 MG/DL
HBA1C MFR BLD: 5.7 %
HCT VFR BLD AUTO: 33.7 % (ref 34.8–46.1)
HGB BLD-MCNC: 9.4 G/DL (ref 11.5–15.4)
IMM GRANULOCYTES # BLD AUTO: 0.01 THOUSAND/UL (ref 0–0.2)
IMM GRANULOCYTES NFR BLD AUTO: 0 % (ref 0–2)
LYMPHOCYTES # BLD AUTO: 1.12 THOUSANDS/ΜL (ref 0.6–4.47)
LYMPHOCYTES NFR BLD AUTO: 25 % (ref 14–44)
MCH RBC QN AUTO: 20.7 PG (ref 26.8–34.3)
MCHC RBC AUTO-ENTMCNC: 27.9 G/DL (ref 31.4–37.4)
MCV RBC AUTO: 74 FL (ref 82–98)
MONOCYTES # BLD AUTO: 0.53 THOUSAND/ΜL (ref 0.17–1.22)
MONOCYTES NFR BLD AUTO: 12 % (ref 4–12)
NEUTROPHILS # BLD AUTO: 2.4 THOUSANDS/ΜL (ref 1.85–7.62)
NEUTS SEG NFR BLD AUTO: 53 % (ref 43–75)
NRBC BLD AUTO-RTO: 0 /100 WBCS
PLATELET # BLD AUTO: 304 THOUSANDS/UL (ref 149–390)
PMV BLD AUTO: 11.2 FL (ref 8.9–12.7)
RBC # BLD AUTO: 4.54 MILLION/UL (ref 3.81–5.12)
WBC # BLD AUTO: 4.51 THOUSAND/UL (ref 4.31–10.16)

## 2025-02-15 PROCEDURE — 86901 BLOOD TYPING SEROLOGIC RH(D): CPT | Performed by: OBSTETRICS & GYNECOLOGY

## 2025-02-15 PROCEDURE — 36415 COLL VENOUS BLD VENIPUNCTURE: CPT

## 2025-02-15 PROCEDURE — 85025 COMPLETE CBC W/AUTO DIFF WBC: CPT

## 2025-02-15 PROCEDURE — 86900 BLOOD TYPING SEROLOGIC ABO: CPT | Performed by: OBSTETRICS & GYNECOLOGY

## 2025-02-15 PROCEDURE — 86850 RBC ANTIBODY SCREEN: CPT | Performed by: OBSTETRICS & GYNECOLOGY

## 2025-02-15 PROCEDURE — 83036 HEMOGLOBIN GLYCOSYLATED A1C: CPT

## 2025-02-16 ENCOUNTER — LAB REQUISITION (OUTPATIENT)
Dept: LAB | Facility: HOSPITAL | Age: 40
End: 2025-02-16
Payer: COMMERCIAL

## 2025-02-16 DIAGNOSIS — N94.6 DYSMENORRHEA, UNSPECIFIED: ICD-10-CM

## 2025-02-16 LAB
ABO GROUP BLD: NORMAL
BLD GP AB SCN SERPL QL: NEGATIVE
RH BLD: POSITIVE
SPECIMEN EXPIRATION DATE: NORMAL

## 2025-02-17 ENCOUNTER — RESULTS FOLLOW-UP (OUTPATIENT)
Dept: OBGYN CLINIC | Facility: CLINIC | Age: 40
End: 2025-02-17

## 2025-02-17 DIAGNOSIS — D64.9 MILD ANEMIA: Primary | ICD-10-CM

## 2025-02-18 ENCOUNTER — OFFICE VISIT (OUTPATIENT)
Dept: SURGERY | Facility: CLINIC | Age: 40
End: 2025-02-18

## 2025-02-18 VITALS
BODY MASS INDEX: 22.58 KG/M2 | DIASTOLIC BLOOD PRESSURE: 64 MMHG | WEIGHT: 112 LBS | TEMPERATURE: 98 F | HEIGHT: 59 IN | HEART RATE: 60 BPM | OXYGEN SATURATION: 99 % | SYSTOLIC BLOOD PRESSURE: 106 MMHG

## 2025-02-18 DIAGNOSIS — D17.1 LIPOMA OF TORSO: Primary | ICD-10-CM

## 2025-02-18 PROCEDURE — 99024 POSTOP FOLLOW-UP VISIT: CPT | Performed by: STUDENT IN AN ORGANIZED HEALTH CARE EDUCATION/TRAINING PROGRAM

## 2025-02-18 NOTE — PROGRESS NOTES
Name: Katherin Barragan      : 1985      MRN: [id number]  Encounter Provider: Forest Jerry DO  Encounter Date: 2025   Encounter department: [facility]  :  Assessment & Plan  Lipoma of torso  39-year-old female status post excision of back lipoma on 2025  -Patient denies any pain  -Back incision is healing well without erythema induration or drainage  -Pathology report reviewed  -Follow-up in office as needed    Final Diagnosis   A. Soft Tissue, Back, Left, Mass:  -   Mature fibroadipose tissue consistent with lipoma.      I reviewed the pathology report and discussed the findings with the patient and their accompanying family or caregiver, if present. All questions were answered to satisfaction and the patient along with family or caregiver, if present, voiced understanding.             History of Present Illness   HPI  Katherin Barragan is a 39 y.o. female who presents for evaluation status post excision of back mass.  She denies any pain.  History obtained from: patient    Review of Systems   Constitutional:  Negative for chills, fatigue and fever.   HENT:  Negative for congestion, hearing loss, rhinorrhea and sore throat.    Eyes:  Negative for pain and discharge.   Respiratory:  Negative for cough, chest tightness and shortness of breath.    Cardiovascular:  Negative for chest pain and palpitations.   Gastrointestinal:  Negative for abdominal pain, constipation, diarrhea, nausea and vomiting.   Endocrine: Negative for cold intolerance and heat intolerance.   Genitourinary:  Negative for difficulty urinating and dysuria.   Musculoskeletal:  Negative for back pain and neck pain.   Skin:  Negative for color change and rash.   Allergic/Immunologic: Positive for environmental allergies. Negative for food allergies.   Neurological:  Negative for seizures and headaches.   Hematological:  Does not bruise/bleed easily.   Psychiatric/Behavioral:  Negative for confusion and  hallucinations.      Medical History Reviewed by provider this encounter:  Tobacco  Allergies  Meds  Problems  Med Hx  Surg Hx  Fam Hx     .  Past Medical History   Past Medical History:   Diagnosis Date    Anemia     Anxiety 04/12/2016    Cardiac disease     pulmonary stenosis    Depression 09/03/2020    Disease of thyroid gland     hypothyroidism    Hypothyroidism     Lumbar back pain with radiculopathy affecting left lower extremity 09/03/2020    Lumbar back pain with radiculopathy affecting right lower extremity 09/03/2020    Filemon syndrome     Pulmonary stenosis      Past Surgical History:   Procedure Laterality Date    BREAST BIOPSY Left     CARDIAC SURGERY      pulmonary stenosis    CO EXCISION TUMOR SOFT TISSUE BACK/FLANK SUBQ <3CM Left 2/7/2025    Procedure: EXCISION  BIOPSY LESION/MASS BACK;  Surgeon: Forest Jerry DO;  Location: [facility];  Service: General    WISDOM TOOTH EXTRACTION       Family History   Problem Relation Age of Onset    Thyroid disease Mother     Asthma Mother         As a child    Diabetes Father     Stroke Father       reports that she has quit smoking. Her smoking use included cigarettes. She has a 2.5 pack-year smoking history. She has been exposed to tobacco smoke. She has never used smokeless tobacco. She reports current alcohol use. She reports that she does not use drugs.  Current Outpatient Medications   Medication Instructions    albuterol (PROVENTIL HFA,VENTOLIN HFA) 90 mcg/act inhaler INHALE 2 PUFFS EVERY 6 HOURS AS NEEDED FOR WHEEZING    Synthroid 150 mcg, Oral, Daily     Allergies   Allergen Reactions    Bee Venom     Pollen Extract       Current Outpatient Medications on File Prior to Visit   Medication Sig Dispense Refill    albuterol (PROVENTIL HFA,VENTOLIN HFA) 90 mcg/act inhaler INHALE 2 PUFFS EVERY 6 HOURS AS NEEDED FOR WHEEZING 8.5 g 0    Synthroid 150 MCG tablet Take 1 tablet (150 mcg total) by mouth daily 90 tablet 0     No current  "facility-administered medications on file prior to visit.      Social History     Tobacco Use    Smoking status: Former     Current packs/day: 0.50     Average packs/day: 0.5 packs/day for 5.0 years (2.5 ttl pk-yrs)     Types: Cigarettes     Passive exposure: Past    Smokeless tobacco: Never   Vaping Use    Vaping status: Never Used   Substance and Sexual Activity    Alcohol use: Yes     Comment: social    Drug use: Never    Sexual activity: Not Currently     Partners: Male     Birth control/protection: Abstinence, None        Objective   /64 (Patient Position: Sitting, Cuff Size: Standard)   Pulse 60   Temp 98 °F (36.7 °C) (Temporal)   Ht 4' 11\" (1.499 m)   Wt 50.8 kg (112 lb)   LMP 01/16/2025 (Exact Date)   SpO2 99%   BMI 22.62 kg/m²      Physical Exam  Constitutional:       Appearance: Normal appearance.   HENT:      Head: Normocephalic and atraumatic.      Nose: Nose normal.   Eyes:      General: No scleral icterus.     Conjunctiva/sclera: Conjunctivae normal.   Cardiovascular:      Rate and Rhythm: Normal rate.   Pulmonary:      Effort: Pulmonary effort is normal.   Musculoskeletal:         General: No signs of injury.   Skin:     General: Skin is warm.      Coloration: Skin is not jaundiced.      Comments: Incision healing well without erythema induration or drainage   Neurological:      General: No focal deficit present.      Mental Status: She is alert and oriented to person, place, and time.   Psychiatric:         Mood and Affect: Mood normal.         Behavior: Behavior normal.           "

## 2025-02-18 NOTE — ASSESSMENT & PLAN NOTE
39-year-old female status post excision of back lipoma on 2/7/2025  -Patient denies any pain  -Back incision is healing well without erythema induration or drainage  -Pathology report reviewed  -Follow-up in office as needed    Final Diagnosis   A. Soft Tissue, Back, Left, Mass:  -   Mature fibroadipose tissue consistent with lipoma.      I reviewed the pathology report and discussed the findings with the patient and their accompanying family or caregiver, if present. All questions were answered to satisfaction and the patient along with family or caregiver, if present, voiced understanding.

## 2025-02-19 ENCOUNTER — HOSPITAL ENCOUNTER (OUTPATIENT)
Dept: ULTRASOUND IMAGING | Facility: HOSPITAL | Age: 40
Discharge: HOME | End: 2025-02-19
Payer: COMMERCIAL

## 2025-02-19 DIAGNOSIS — N83.201 RIGHT OVARIAN CYST: ICD-10-CM

## 2025-02-19 PROCEDURE — 76830 TRANSVAGINAL US NON-OB: CPT

## 2025-02-19 PROCEDURE — 76856 US EXAM PELVIC COMPLETE: CPT

## 2025-02-20 ENCOUNTER — ANESTHESIA EVENT (OUTPATIENT)
Dept: PERIOP | Facility: HOSPITAL | Age: 40
End: 2025-02-20
Payer: COMMERCIAL

## 2025-02-24 RX ORDER — FLUTICASONE PROPIONATE AND SALMETEROL 250; 50 UG/1; UG/1
1 POWDER RESPIRATORY (INHALATION) 2 TIMES DAILY
COMMUNITY

## 2025-02-24 RX ORDER — FERROUS SULFATE 325(65) MG
325 TABLET, DELAYED RELEASE (ENTERIC COATED) ORAL
COMMUNITY

## 2025-02-24 NOTE — PRE-PROCEDURE INSTRUCTIONS
Pre-Surgery Instructions:   Medication Instructions    ferrous sulfate 325 (65 FE) MG EC tablet Hold day of surgery.    Fluticasone-Salmeterol (Advair) 250-50 mcg/dose inhaler Take day of surgery.    albuterol (PROVENTIL HFA,VENTOLIN HFA) 90 mcg/act inhaler Uses PRN- OK to take day of surgery    Synthroid 150 MCG tablet Take day of surgery.      Medication instructions for day of surgery reviewed. Please take all instructed medications with only a sip of water.       You will receive a call one business day prior to surgery with an arrival time and hospital directions. If your surgery is scheduled on a Monday, the hospital will be calling you on the Friday prior to your surgery. If you have not heard from anyone by 8pm, please call the hospital supervisor through the hospital  at [phone].     Do not eat or drink anything after midnight the night before your surgery, including candy, mints, lifesavers, or chewing gum. Do not drink alcohol 24hrs before your surgery. Try not to smoke at least 24hrs before your surgery.       Follow the pre surgery showering instructions as listed in the “My Surgical Experience Booklet” or otherwise provided by your surgeon's office. Do not use a blade to shave the surgical area 1 week before surgery. It is okay to use a clean electric clippers up to 24 hours before surgery. Do not apply any lotions, creams, including makeup, cologne, deodorant, or perfumes after showering on the day of your surgery. Do not use dry shampoo, hair spray, hair gel, or any type of hair products.     No contact lenses, eye make-up, or artificial eyelashes. Remove nail polish, including gel polish, and any artificial, gel, or acrylic nails if possible. Remove all jewelry including rings and body piercing jewelry.     Wear causal clothing that is easy to take on and off. Consider your type of surgery.    Keep any valuables, jewelry, piercings at home. Please bring any specially ordered equipment  (sling, braces) if indicated.    Arrange for a responsible person to drive you to and from the hospital on the day of your surgery. Please confirm the visitor policy for the day of your procedure when you receive your phone call with an arrival time.     Call the surgeon's office with any new illnesses, exposures, or additional questions prior to surgery.    Please reference your “My Surgical Experience Booklet” for additional information to prepare for your upcoming surgery.

## 2025-02-28 ENCOUNTER — RESULTS FOLLOW-UP (OUTPATIENT)
Dept: OBGYN CLINIC | Facility: CLINIC | Age: 40
End: 2025-02-28

## 2025-03-01 ENCOUNTER — APPOINTMENT (OUTPATIENT)
Dept: LAB | Facility: CLINIC | Age: 40
End: 2025-03-01
Payer: COMMERCIAL

## 2025-03-01 DIAGNOSIS — D50.0 IRON DEFICIENCY ANEMIA DUE TO CHRONIC BLOOD LOSS: ICD-10-CM

## 2025-03-01 DIAGNOSIS — D64.9 MILD ANEMIA: ICD-10-CM

## 2025-03-01 DIAGNOSIS — N92.0 MENORRHAGIA WITH REGULAR CYCLE: ICD-10-CM

## 2025-03-01 DIAGNOSIS — N94.6 DYSMENORRHEA: ICD-10-CM

## 2025-03-01 DIAGNOSIS — Z01.818 OTHER SPECIFIED PRE-OPERATIVE EXAMINATION: ICD-10-CM

## 2025-03-01 DIAGNOSIS — Z01.812 ENCOUNTER FOR PRE-OPERATIVE LABORATORY TESTING: ICD-10-CM

## 2025-03-01 LAB
BASOPHILS # BLD AUTO: 0.06 THOUSANDS/ÂΜL (ref 0–0.1)
BASOPHILS NFR BLD AUTO: 1 % (ref 0–1)
EOSINOPHIL # BLD AUTO: 0.37 THOUSAND/ÂΜL (ref 0–0.61)
EOSINOPHIL NFR BLD AUTO: 6 % (ref 0–6)
ERYTHROCYTE [DISTWIDTH] IN BLOOD BY AUTOMATED COUNT: 23.9 % (ref 11.6–15.1)
HCT VFR BLD AUTO: 36.8 % (ref 34.8–46.1)
HGB BLD-MCNC: 10.7 G/DL (ref 11.5–15.4)
IMM GRANULOCYTES # BLD AUTO: 0.1 THOUSAND/UL (ref 0–0.2)
IMM GRANULOCYTES NFR BLD AUTO: 2 % (ref 0–2)
LYMPHOCYTES # BLD AUTO: 1.71 THOUSANDS/ÂΜL (ref 0.6–4.47)
LYMPHOCYTES NFR BLD AUTO: 27 % (ref 14–44)
MCH RBC QN AUTO: 22.4 PG (ref 26.8–34.3)
MCHC RBC AUTO-ENTMCNC: 29.1 G/DL (ref 31.4–37.4)
MCV RBC AUTO: 77 FL (ref 82–98)
MONOCYTES # BLD AUTO: 0.72 THOUSAND/ÂΜL (ref 0.17–1.22)
MONOCYTES NFR BLD AUTO: 12 % (ref 4–12)
NEUTROPHILS # BLD AUTO: 3.27 THOUSANDS/ÂΜL (ref 1.85–7.62)
NEUTS SEG NFR BLD AUTO: 52 % (ref 43–75)
NRBC BLD AUTO-RTO: 0 /100 WBCS
PLATELET # BLD AUTO: 278 THOUSANDS/UL (ref 149–390)
PMV BLD AUTO: 10.7 FL (ref 8.9–12.7)
RBC # BLD AUTO: 4.78 MILLION/UL (ref 3.81–5.12)
WBC # BLD AUTO: 6.23 THOUSAND/UL (ref 4.31–10.16)

## 2025-03-01 PROCEDURE — 85025 COMPLETE CBC W/AUTO DIFF WBC: CPT

## 2025-03-01 PROCEDURE — 36415 COLL VENOUS BLD VENIPUNCTURE: CPT

## 2025-03-05 ENCOUNTER — HOSPITAL ENCOUNTER (OUTPATIENT)
Facility: HOSPITAL | Age: 40
Setting detail: OUTPATIENT SURGERY
Discharge: HOME | End: 2025-03-05
Attending: OBSTETRICS & GYNECOLOGY | Admitting: OBSTETRICS & GYNECOLOGY
Payer: COMMERCIAL

## 2025-03-05 ENCOUNTER — ANESTHESIA (OUTPATIENT)
Dept: PERIOP | Facility: HOSPITAL | Age: 40
End: 2025-03-05
Payer: COMMERCIAL

## 2025-03-05 VITALS
BODY MASS INDEX: 22.38 KG/M2 | SYSTOLIC BLOOD PRESSURE: 119 MMHG | TEMPERATURE: 97.6 F | HEIGHT: 59 IN | WEIGHT: 111 LBS | OXYGEN SATURATION: 100 % | HEART RATE: 90 BPM | DIASTOLIC BLOOD PRESSURE: 65 MMHG | RESPIRATION RATE: 16 BRPM

## 2025-03-05 DIAGNOSIS — N92.0 MENORRHAGIA WITH REGULAR CYCLE: ICD-10-CM

## 2025-03-05 DIAGNOSIS — D50.0 IRON DEFICIENCY ANEMIA DUE TO CHRONIC BLOOD LOSS: ICD-10-CM

## 2025-03-05 DIAGNOSIS — Z90.710 S/P HYSTERECTOMY: Primary | ICD-10-CM

## 2025-03-05 DIAGNOSIS — N94.6 DYSMENORRHEA: ICD-10-CM

## 2025-03-05 LAB
ABO GROUP BLD: NORMAL
EXT PREGNANCY TEST URINE: NEGATIVE
EXT. CONTROL: NORMAL
RH BLD: POSITIVE

## 2025-03-05 PROCEDURE — 81025 URINE PREGNANCY TEST: CPT | Performed by: OBSTETRICS & GYNECOLOGY

## 2025-03-05 PROCEDURE — 58571 TLH W/T/O 250 G OR LESS: CPT | Performed by: OBSTETRICS & GYNECOLOGY

## 2025-03-05 PROCEDURE — S2900 ROBOTIC SURGICAL SYSTEM: HCPCS | Performed by: OBSTETRICS & GYNECOLOGY

## 2025-03-05 PROCEDURE — 88307 TISSUE EXAM BY PATHOLOGIST: CPT | Performed by: PATHOLOGY

## 2025-03-05 PROCEDURE — C1758 CATHETER, URETERAL: HCPCS | Performed by: OBSTETRICS & GYNECOLOGY

## 2025-03-05 PROCEDURE — NC001 PR NO CHARGE: Performed by: OBSTETRICS & GYNECOLOGY

## 2025-03-05 RX ORDER — ALBUTEROL SULFATE 0.83 MG/ML
2.5 SOLUTION RESPIRATORY (INHALATION) ONCE AS NEEDED
Status: DISCONTINUED | OUTPATIENT
Start: 2025-03-05 | End: 2025-03-05 | Stop reason: HOSPADM

## 2025-03-05 RX ORDER — SODIUM CHLORIDE 9 MG/ML
INJECTION, SOLUTION INTRAVENOUS AS NEEDED
Status: DISCONTINUED | OUTPATIENT
Start: 2025-03-05 | End: 2025-03-05 | Stop reason: HOSPADM

## 2025-03-05 RX ORDER — BUPIVACAINE HYDROCHLORIDE AND EPINEPHRINE 2.5; 5 MG/ML; UG/ML
INJECTION, SOLUTION EPIDURAL; INFILTRATION; INTRACAUDAL; PERINEURAL AS NEEDED
Status: DISCONTINUED | OUTPATIENT
Start: 2025-03-05 | End: 2025-03-05 | Stop reason: HOSPADM

## 2025-03-05 RX ORDER — IBUPROFEN 600 MG/1
600 TABLET, FILM COATED ORAL EVERY 6 HOURS PRN
Qty: 30 TABLET | Refills: 0 | Status: SHIPPED | OUTPATIENT
Start: 2025-03-05

## 2025-03-05 RX ORDER — ACETAMINOPHEN 325 MG/1
975 TABLET ORAL EVERY 6 HOURS PRN
Status: DISCONTINUED | OUTPATIENT
Start: 2025-03-05 | End: 2025-03-05 | Stop reason: HOSPADM

## 2025-03-05 RX ORDER — IBUPROFEN 600 MG/1
600 TABLET, FILM COATED ORAL EVERY 6 HOURS PRN
Status: DISCONTINUED | OUTPATIENT
Start: 2025-03-05 | End: 2025-03-05 | Stop reason: HOSPADM

## 2025-03-05 RX ORDER — CEFAZOLIN SODIUM 2 G/50ML
2000 SOLUTION INTRAVENOUS ONCE
Status: COMPLETED | OUTPATIENT
Start: 2025-03-05 | End: 2025-03-05

## 2025-03-05 RX ORDER — OXYCODONE HYDROCHLORIDE 5 MG/1
5 TABLET ORAL EVERY 4 HOURS PRN
Status: DISCONTINUED | OUTPATIENT
Start: 2025-03-05 | End: 2025-03-05 | Stop reason: HOSPADM

## 2025-03-05 RX ORDER — METRONIDAZOLE 500 MG/1
500 TABLET ORAL EVERY 12 HOURS SCHEDULED
Qty: 14 TABLET | Refills: 0 | Status: SHIPPED | OUTPATIENT
Start: 2025-03-05 | End: 2025-03-12

## 2025-03-05 RX ORDER — METOCLOPRAMIDE HYDROCHLORIDE 5 MG/ML
10 INJECTION INTRAMUSCULAR; INTRAVENOUS ONCE
Status: COMPLETED | OUTPATIENT
Start: 2025-03-05 | End: 2025-03-05

## 2025-03-05 RX ORDER — ONDANSETRON 2 MG/ML
4 INJECTION INTRAMUSCULAR; INTRAVENOUS EVERY 6 HOURS PRN
Status: DISCONTINUED | OUTPATIENT
Start: 2025-03-05 | End: 2025-03-05 | Stop reason: HOSPADM

## 2025-03-05 RX ORDER — FENTANYL CITRATE 50 UG/ML
INJECTION, SOLUTION INTRAMUSCULAR; INTRAVENOUS AS NEEDED
Status: DISCONTINUED | OUTPATIENT
Start: 2025-03-05 | End: 2025-03-05

## 2025-03-05 RX ORDER — PROPOFOL 10 MG/ML
INJECTION, EMULSION INTRAVENOUS AS NEEDED
Status: DISCONTINUED | OUTPATIENT
Start: 2025-03-05 | End: 2025-03-05

## 2025-03-05 RX ORDER — MAGNESIUM HYDROXIDE 1200 MG/15ML
LIQUID ORAL AS NEEDED
Status: DISCONTINUED | OUTPATIENT
Start: 2025-03-05 | End: 2025-03-05 | Stop reason: HOSPADM

## 2025-03-05 RX ORDER — MIDAZOLAM HYDROCHLORIDE 2 MG/2ML
INJECTION, SOLUTION INTRAMUSCULAR; INTRAVENOUS AS NEEDED
Status: DISCONTINUED | OUTPATIENT
Start: 2025-03-05 | End: 2025-03-05

## 2025-03-05 RX ORDER — DEXAMETHASONE SODIUM PHOSPHATE 10 MG/ML
INJECTION, SOLUTION INTRAMUSCULAR; INTRAVENOUS AS NEEDED
Status: DISCONTINUED | OUTPATIENT
Start: 2025-03-05 | End: 2025-03-05

## 2025-03-05 RX ORDER — SODIUM CHLORIDE, SODIUM LACTATE, POTASSIUM CHLORIDE, CALCIUM CHLORIDE 600; 310; 30; 20 MG/100ML; MG/100ML; MG/100ML; MG/100ML
INJECTION, SOLUTION INTRAVENOUS CONTINUOUS PRN
Status: DISCONTINUED | OUTPATIENT
Start: 2025-03-05 | End: 2025-03-05

## 2025-03-05 RX ORDER — ONDANSETRON 2 MG/ML
INJECTION INTRAMUSCULAR; INTRAVENOUS AS NEEDED
Status: DISCONTINUED | OUTPATIENT
Start: 2025-03-05 | End: 2025-03-05

## 2025-03-05 RX ORDER — LIDOCAINE HYDROCHLORIDE 10 MG/ML
INJECTION, SOLUTION EPIDURAL; INFILTRATION; INTRACAUDAL; PERINEURAL AS NEEDED
Status: DISCONTINUED | OUTPATIENT
Start: 2025-03-05 | End: 2025-03-05

## 2025-03-05 RX ORDER — FENTANYL CITRATE/PF 50 MCG/ML
25 SYRINGE (ML) INJECTION
Status: DISCONTINUED | OUTPATIENT
Start: 2025-03-05 | End: 2025-03-05 | Stop reason: HOSPADM

## 2025-03-05 RX ORDER — ROCURONIUM BROMIDE 10 MG/ML
INJECTION, SOLUTION INTRAVENOUS AS NEEDED
Status: DISCONTINUED | OUTPATIENT
Start: 2025-03-05 | End: 2025-03-05

## 2025-03-05 RX ORDER — ONDANSETRON 2 MG/ML
4 INJECTION INTRAMUSCULAR; INTRAVENOUS ONCE AS NEEDED
Status: COMPLETED | OUTPATIENT
Start: 2025-03-05 | End: 2025-03-05

## 2025-03-05 RX ORDER — OXYCODONE AND ACETAMINOPHEN 5; 325 MG/1; MG/1
1 TABLET ORAL EVERY 4 HOURS PRN
Qty: 15 TABLET | Refills: 0 | Status: SHIPPED | OUTPATIENT
Start: 2025-03-05 | End: 2025-03-15

## 2025-03-05 RX ORDER — DOCUSATE SODIUM 100 MG/1
100 CAPSULE, LIQUID FILLED ORAL DAILY
Qty: 10 CAPSULE | Refills: 1 | Status: SHIPPED | OUTPATIENT
Start: 2025-03-05

## 2025-03-05 RX ORDER — HYDROMORPHONE HCL/PF 1 MG/ML
0.5 SYRINGE (ML) INJECTION
Status: DISCONTINUED | OUTPATIENT
Start: 2025-03-05 | End: 2025-03-05 | Stop reason: HOSPADM

## 2025-03-05 RX ORDER — HYDROMORPHONE HCL/PF 1 MG/ML
SYRINGE (ML) INJECTION AS NEEDED
Status: DISCONTINUED | OUTPATIENT
Start: 2025-03-05 | End: 2025-03-05

## 2025-03-05 RX ADMIN — FENTANYL CITRATE 25 MCG: 50 INJECTION INTRAMUSCULAR; INTRAVENOUS at 11:45

## 2025-03-05 RX ADMIN — ROCURONIUM BROMIDE 20 MG: 10 INJECTION, SOLUTION INTRAVENOUS at 09:19

## 2025-03-05 RX ADMIN — SUGAMMADEX 100 MG: 100 INJECTION, SOLUTION INTRAVENOUS at 11:13

## 2025-03-05 RX ADMIN — HYDROMORPHONE HYDROCHLORIDE 0.5 MG: 1 INJECTION, SOLUTION INTRAMUSCULAR; INTRAVENOUS; SUBCUTANEOUS at 08:49

## 2025-03-05 RX ADMIN — DEXAMETHASONE SODIUM PHOSPHATE 10 MG: 10 INJECTION, SOLUTION INTRAMUSCULAR; INTRAVENOUS at 08:13

## 2025-03-05 RX ADMIN — SODIUM CHLORIDE, SODIUM LACTATE, POTASSIUM CHLORIDE, AND CALCIUM CHLORIDE: .6; .31; .03; .02 INJECTION, SOLUTION INTRAVENOUS at 08:02

## 2025-03-05 RX ADMIN — CEFAZOLIN SODIUM 2000 MG: 2 SOLUTION INTRAVENOUS at 08:10

## 2025-03-05 RX ADMIN — SUGAMMADEX 200 MG: 100 INJECTION, SOLUTION INTRAVENOUS at 10:07

## 2025-03-05 RX ADMIN — LIDOCAINE HYDROCHLORIDE 50 MG: 10 INJECTION, SOLUTION EPIDURAL; INFILTRATION; INTRACAUDAL at 08:08

## 2025-03-05 RX ADMIN — FENTANYL CITRATE 25 MCG: 50 INJECTION INTRAMUSCULAR; INTRAVENOUS at 12:01

## 2025-03-05 RX ADMIN — SODIUM CHLORIDE, SODIUM LACTATE, POTASSIUM CHLORIDE, AND CALCIUM CHLORIDE: .6; .31; .03; .02 INJECTION, SOLUTION INTRAVENOUS at 09:20

## 2025-03-05 RX ADMIN — PROPOFOL 200 MG: 10 INJECTION, EMULSION INTRAVENOUS at 08:08

## 2025-03-05 RX ADMIN — OXYCODONE 5 MG: 5 TABLET ORAL at 12:26

## 2025-03-05 RX ADMIN — ONDANSETRON 4 MG: 2 INJECTION INTRAMUSCULAR; INTRAVENOUS at 11:45

## 2025-03-05 RX ADMIN — HYDROMORPHONE HYDROCHLORIDE 0.5 MG: 1 INJECTION, SOLUTION INTRAMUSCULAR; INTRAVENOUS; SUBCUTANEOUS at 09:06

## 2025-03-05 RX ADMIN — ONDANSETRON 4 MG: 2 INJECTION INTRAMUSCULAR; INTRAVENOUS at 11:07

## 2025-03-05 RX ADMIN — MIDAZOLAM 2 MG: 1 INJECTION INTRAMUSCULAR; INTRAVENOUS at 08:02

## 2025-03-05 RX ADMIN — METOCLOPRAMIDE HYDROCHLORIDE 10 MG: 5 INJECTION INTRAMUSCULAR; INTRAVENOUS at 13:09

## 2025-03-05 RX ADMIN — ROCURONIUM BROMIDE 50 MG: 10 INJECTION, SOLUTION INTRAVENOUS at 08:08

## 2025-03-05 RX ADMIN — FENTANYL CITRATE 50 MCG: 50 INJECTION INTRAMUSCULAR; INTRAVENOUS at 08:29

## 2025-03-05 RX ADMIN — FENTANYL CITRATE 50 MCG: 50 INJECTION INTRAMUSCULAR; INTRAVENOUS at 08:08

## 2025-03-05 NOTE — ANESTHESIA POSTPROCEDURE EVALUATION
Post-Op Assessment Note    CV Status:  Stable  Pain Score: 0    Pain management: adequate       Mental Status:  Alert and awake   Hydration Status:  Stable   PONV Controlled:  None   Airway Patency:  Patent     Post Op Vitals Reviewed: Yes    No anethesia notable event occurred.    Staff: CRNA           Last Filed PACU Vitals:  Vitals Value Taken Time   Temp     Pulse 79 03/05/25 1127   /74 03/05/25 1126   Resp 14 03/05/25 1127   SpO2 100 % 03/05/25 1127   Vitals shown include unfiled device data.

## 2025-03-05 NOTE — OP NOTE
OPERATIVE REPORT  PATIENT NAME: Katherin Barragan    :  1985  MRN: [id number]  Pt Location: [facility] OR ROOM 02    SURGERY DATE: 3/5/2025    Surgeons and Role:     * Luz Coronado MD - Primary     * Jay Kc MD - Assisting     * Onel Ellsworth MD - Assisting    Preop Diagnosis:  Menorrhagia with regular cycle [N92.0]  Dysmenorrhea [N94.6]  Iron deficiency anemia due to chronic blood loss [D50.0]    Post-Op Diagnosis Codes:     * Menorrhagia with regular cycle [N92.0]     * Dysmenorrhea [N94.6]     * Iron deficiency anemia due to chronic blood loss [D50.0]    Procedure(s):  Bilateral - EXAM UNDER ANESTHESIA; HYSTERECTOMY LAPAROSCOPIC TOTAL (LTH) W/ ROBOTICS AND SALPINGECTOMY.  CYSTOSCOPY    Specimen(s):  ID Type Source Tests Collected by Time Destination   1 : UTERUS, CERVIX, BILATERAL FALLOPIAN TUBES Tissue Soft Tissue, Other TISSUE EXAM Luz Coronado MD 3/5/2025  9:45 AM        Estimated Blood Loss:   Minimal    Drains:  Urethral Catheter Non-latex (Active)   Number of days: 0       Anesthesia Type:   General    Operative Indications:  Menorrhagia with regular cycle [N92.0]  Dysmenorrhea [N94.6]  Iron deficiency anemia due to chronic blood loss [D50.0]      Operative Findings:  Normal external genitalia  Both fallopian tube looks mildly enlarged but normal  Both ovary looks normal  Uterus mildly enlarged  Liver mildly enlarged pictures taking will be sent to GI for evaluation  Cystoscopy was performed at the end of the case bladder wall noted to be intact bubble at the dome of the bladder both ureteral jets were visualized  Unusual oozing noted from the bladder pillar and the vaginal cuff Surgi-Ludin was used with good hemostasis at the end of the case      Complications:   None    Procedure and Technique:    The patient was taken to the operating room where general endotracheal anesthesia was induced without complications. The patient received antibiotic prophylaxis per hospital protocol.  Sequential  compression devices were applied to the lower extremities and activated prior to induction of anesthesia.   The patient was placed in the dorsolithotomy position in Calvin stirrups and her lower abdomen, perineum and vagina were prepped and draped in the usual sterile fashion. Under direct visualization the uterus was sounded to approximately 7 cm. The endocervix was dilated. A. FLAQUITO uterine manipulator was secured in place with a stitch of 0 Vicryl.  '    Mccallum catheter was placed and the intravaginal occluder balloon was inflated.     Attention was turned to the abdomen. All port sites were infiltrated with 20 cc of 25% bupivacaine  with epi m at the  completion of the procedure.   An 5 mm skin incision was made at the umbilicus  Then, using a 5 mm Endopath Excel trocar and the 5 mm laparoscope, the peritoneal cavity was entered under directvisualization. Good intraperitoneal location was confirmed and pneumoperitoneum was created to maximal pressure 15 mm of mercury.     for a total four 8 mm robotic trocars were placed (2 on the left, 1 in the midline  and 1 on the right) one of the port replaced the umbilicus point entry  . The patient was placed in steep Trendelenburg and the Rutanetinci system was docked. The above-mentioned findings were noted.     Left Lateral part of the fallopian tube was grasped bites were taking at the level of the mesosalpinx lateral to medial using bipolar followed by monopolar so we reached the corneal edge following the the utero-ovarian ligament was cauterized and cut bilaterally with good hemostasis  The round ligaments were cauterized and divided bilaterally and the bladder was mobilized anteriorly. Some fibrotic adhesions were noted and taken down sharply without difficulties. The bladder was mobilized without difficulties.  Oozing noted from the bladder pillar at the dissection   Same thing was  done on the right side     The ureters were clearly identified transperitoneally.    The  uterine vessels were skeletonized cauterized and divided bilaterally.  The cardinal ligaments were serially cauterized and divided and a circumferential colpotomy was made.  The specimen was removed through the vagina.   Following that the distal fimbriated end of fallopian tube was removed and sent to pathology for direct visualization using bipolar and monopolar    The vaginal cuff was closed using a running stitch of 2-0 PDO Stratafix.  Airtight closure an excellent hemostasis was obtained.     The Mccallum was removed.  30 degree cystoscope bladder was retrograde filled with approximately 150 mL of NS. We examined the bladder bladder wall noted to be intact both ureteral jets were visualized bubble at the dome of the bladder  Secondary to bladder pulmonary oozing and vaginal cuff bleeding couple of clip was used on the right vaginal cuff lateral to the angle stitch and suture 2-0 Vicryl figure of 8 was used on the left angle of the vaginal cuff Floseal was used for the bladder pillar using good hemostasis was noted at the end of the case    Prior to using Floseal we will try to use Surgicel gauze this was removed and replaced by Floseal  After that cystoscopy was performed again confirmed both ureteral jets were seen and visualized as well as bladder wall noted to be intact     at this point the robot was undocked. Pneumoperitoneum was completely released with the assistance of Valsalva maneuvers.  All trocars were removed and the skin was closed using a subcuticular stitch of 4-0 Monocryl and Histoacryl was applied to all incisions.  The patient tolerated the procedure well. Sponge, lap, needle and instrument counts were reported as correct x2.  The patient was successfully extubated in the operating room and transferred to the post anesthetic care unit in stable condition.        I was present for the entire procedure.    Patient Disposition:  PACU               SIGNATURE: Luz Coronado MD  DATE: March 5,  2025  TIME: 11:06 AM

## 2025-03-05 NOTE — ANESTHESIA PREPROCEDURE EVALUATION
Procedure:  EXAM UNDER ANESTHESIA; HYSTERECTOMY LAPAROSCOPIC TOTAL (LTH) W/ ROBOTICS AND SALPINGECTOMY. 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   Prior Anesthetics never reported difficult airway  O+ T+S Hbg 10.7  Advair and albuterol - well controlled, only taking albuterol last use was about a month ago    Denies the following: CP/SOB with exertion, COPD, JATIN, stroke/TIA, seizure    Relevant Problems   CARDIO   (+) Nonrheumatic pulmonary valve stenosis      ENDO   (+) Hypothyroidism      HEMATOLOGY   (+) Iron deficiency anemia      NEURO/PSYCH   (+) Anxiety and depression        Physical Exam    Airway    Mallampati score: II  TM Distance: >3 FB  Neck ROM: full     Dental   No notable dental hx     Cardiovascular      Pulmonary      Other Findings  post-pubertal.      Anesthesia Plan  ASA Score- 2     Anesthesia Type- general with ASA Monitors.         Additional Monitors:     Airway Plan: ETT.           Plan Factors-Exercise tolerance (METS): >4 METS.    Chart reviewed. EKG reviewed.  Existing labs reviewed. Patient summary reviewed.    Patient is not a current smoker.      Obstructive sleep apnea risk education given perioperatively.        Induction- intravenous.    Postoperative Plan-         Informed Consent- Anesthetic plan and risks discussed with patient.  I personally reviewed this patient with the CRNA. Discussed and agreed on the Anesthesia Plan with the CRNA..      NPO Status:  No vitals data found for the desired time range.

## 2025-03-05 NOTE — ANESTHESIA POSTPROCEDURE EVALUATION
Post-Op Assessment Note    Last Filed PACU Vitals:  Vitals Value Taken Time   Temp 97.6 °F (36.4 °C) 03/05/25 1211   Pulse 78 03/05/25 1211   /72 03/05/25 1211   Resp 16 03/05/25 1211   SpO2 95 % 03/05/25 1211       Modified [place]:     Vitals Value Taken Time   Activity 2 03/05/25 1211   Respiration 2 03/05/25 1211   Circulation 2 03/05/25 1211   Consciousness 2 03/05/25 1211   Oxygen Saturation 2 03/05/25 1211     Modified [place] Score: 10

## 2025-03-05 NOTE — H&P
39-year-old female  Menorrhagia/dysmenorrhea  Hypothyroid  Anemia  [place] Syndrome  Had open heart surgery with pulmonary stenosis as a child  Currently not sexually active  Plan  Pap/HPV negative  Diet/exercise calcium/vitamin D  Desire definitive management for menorrhagia/dysmenorrhea that is impacting quality of life  Patient desired to proceed with definitive management as her menorrhagia and dysmenorrhea is affecting her quality of life we will proceed with robotic total laparoscopic hysterectomy bilateral salpingectomy exam under anesthesia with cystoscopy and ICG injection  Procedure explained discussed with patient all patient questions answered and patient was satisfied     Subjective:      Subjective  Patient ID: Katherin Barragan is a 39 y.o. female.     HPI  39-year-old female present to the office today to discuss management option for her menorrhagia/dysmenorrhea that is affecting her quality of life  Menses lasted for 7 days 4 of those days are very heavy passing clots and tissue  Patient has anemia with low blood pressure  Patient tried medical management with no improvement     Try OCP and nuva ring also depo and  IUD has side effect and not help much with her period   No OCP since 5 y      Patient is requesting definitive management as her symptom is affecting her quality of life and desire to proceed with hysterectomy procedure explained and discussed with patient risk of bleeding, infection, blood transfusion, risk of bladder injury bowel injury urinary tract injury needing another surgery risk of anesthesia all explained and discussed with patient patient aware of all the risk and desire to proceed with robotic total laparoscopic hysterectomy bilateral salpingectomy cystoscopy and ICG injection will be performed as well  Secondary to the medical condition cardiac clearance is recommended preprocedure        The following portions of the patient's history were reviewed and updated as  "appropriate: allergies, current medications, past family history, past medical history, past social history, past surgical history and problem list.     Review of Systems        Objective:        Ht 4' 11\" (1.499 m)   Wt 50.8 kg (112 lb)   LMP 12/18/2024   BMI 22.62 kg/m²      UTERUS:  The uterus is anteverted in position, measuring 8.3 x 4.0 x 5.2 cm.  The uterus has a normal contour and echotexture.  The cervix appears within normal limits.     ENDOMETRIUM:  The endometrial echo complex has an AP caliber of 15.0 mm, previously 7 mm in June 2022, 4 mm in June 2021.  The endometrium is thickened and mildly heterogeneous measuring 15 mm. Echogenic nodular focus within the canal measures 5 mm and may represent a polyp versus overall heterogeneity of the tissue. Some internal vascularity favors the possibility of a   polyp.     OVARIES/ADNEXA:  Right ovary: 3.4 x 2.3 x 2.2 cm. 9.2 mL.  Ovarian Doppler flow is within normal limits.  No suspicious ovarian or adnexal abnormality. Involuting 1.5 cm cyst appears to be present in the right ovary with mild complexity. No ovarian lesion was reported on ultrasound in October.     Left ovary: 3.7 x 2.0 x 1.5 cm. 5.8 mL.  Ovarian Doppler flow is within normal limits.  No suspicious ovarian or adnexal abnormality.     OTHER:  No free fluid or loculated fluid collections.     IMPRESSION:     Endometrial thickening with probable underlying polypoid changes.     Although this could be related to phase of menstruation, further evaluation with hysterosonography is advised given patient's persistent symptomatology. Correlation with prior biopsy results is also advised.     Complex small cyst in the right ovary is probably involuting corpus luteum. A follow-up in 2-3 menstrual cycles could be considered.     Objective  Physical Exam        General:   alert and oriented, in no acute distress, alert, appears stated age, and cooperative   Heart: regular rate and rhythm, S1, S2 normal, no " murmur, click, rub or gallop   Lungs: clear to auscultation bilaterally   Abdomen: soft, non-tender, without masses or organomegaly   Vulva: normal   Vagina: normal mucosa, normal discharge   Cervix: no cervical motion tenderness and no lesions   Uterus: normal size   Adnexa:  :  normal adnexA

## 2025-03-10 PROCEDURE — 88307 TISSUE EXAM BY PATHOLOGIST: CPT | Performed by: PATHOLOGY

## 2025-03-18 NOTE — PROGRESS NOTES
Pre-charting for Appointment      Reason for being seen today: 2 week post op follow up for total laparoscopic hysterectomy bilateral salpingectomy with cystoscopy and ICG injection     Any current testing/imaging done prior to exam today:

## 2025-03-19 ENCOUNTER — OFFICE VISIT (OUTPATIENT)
Dept: OBGYN CLINIC | Facility: CLINIC | Age: 40
End: 2025-03-19

## 2025-03-19 VITALS
SYSTOLIC BLOOD PRESSURE: 102 MMHG | BODY MASS INDEX: 22.38 KG/M2 | HEIGHT: 59 IN | TEMPERATURE: 98.7 F | WEIGHT: 111 LBS | DIASTOLIC BLOOD PRESSURE: 60 MMHG

## 2025-03-19 DIAGNOSIS — Z90.710 STATUS POST HYSTERECTOMY: Primary | ICD-10-CM

## 2025-03-19 PROCEDURE — 99024 POSTOP FOLLOW-UP VISIT: CPT | Performed by: OBSTETRICS & GYNECOLOGY

## 2025-03-19 NOTE — PROGRESS NOTES
"Assessment/Plan:     There are no diagnoses linked to this encounter.           39-year-old female  Menorrhagia/dysmenorrhea  Hypothyroid  Anemia  Filemon Syndrome  Had open heart surgery with pulmonary stenosis as a child  Currently not sexually active  Status post robotic total laparoscopic hysterectomy bilateral salpingectomy with ICG injection  Plan  Pap/HPV negative  Diet/exercise calcium/vitamin D  Pathology result reviewed discussed with patient  Postop instruction discussed with patient  Return to office in 4 weeks for postop visit  Subjective:      Patient ID: Katherin Barragan is a 39 y.o. female.    HPI  Patient seen evaluated presents to the office today for postop visit with her mother denies any complaint  Denies any vaginal bleeding  Denies any pelvic pain  Denies any urgency frequency or dysuria  Passing flatus and bowel movement  Pathology result reviewed and discussed with patient  Final Diagnosis   A. Uterus, cervix, and bilateral fallopian tubes; hysterectomy and bilateral salpingectomy:  -Benign cervix.  -Benign secretory phased endometrium.  -Bilateral fallopian tubes with no significant histopathologic change         The following portions of the patient's history were reviewed and updated as appropriate: allergies, current medications, past family history, past medical history, past social history, past surgical history and problem list.    Review of Systems      Objective:      /60 (BP Location: Left arm, Patient Position: Sitting, Cuff Size: Adult)   Temp 98.7 °F (37.1 °C)   Ht 4' 11\" (1.499 m)   Wt 50.3 kg (111 lb)   LMP 02/13/2025 (Exact Date)   BMI 22.42 kg/m²          Physical Exam  Constitutional:       Appearance: Normal appearance.   Neurological:      General: No focal deficit present.      Mental Status: She is alert and oriented to person, place, and time.   Psychiatric:         Mood and Affect: Mood normal.         Behavior: Behavior normal.           "

## 2025-04-16 ENCOUNTER — OFFICE VISIT (OUTPATIENT)
Dept: OBGYN CLINIC | Facility: CLINIC | Age: 40
End: 2025-04-16

## 2025-04-16 VITALS
SYSTOLIC BLOOD PRESSURE: 114 MMHG | DIASTOLIC BLOOD PRESSURE: 60 MMHG | HEIGHT: 59 IN | WEIGHT: 109.6 LBS | BODY MASS INDEX: 22.09 KG/M2

## 2025-04-16 DIAGNOSIS — Z90.710 STATUS POST HYSTERECTOMY: Primary | ICD-10-CM

## 2025-04-16 PROCEDURE — 99024 POSTOP FOLLOW-UP VISIT: CPT | Performed by: OBSTETRICS & GYNECOLOGY

## 2025-04-16 NOTE — PROGRESS NOTES
"Assessment/Plan:     Diagnoses and all orders for this visit:    Status post hysterectomy      39-year-old female  Menorrhagia/dysmenorrhea  Hypothyroid  Anemia  Filemon Syndrome  Had open heart surgery with pulmonary stenosis as a child  Currently not sexually active  Status post robotic total laparoscopic hysterectomy bilateral salpingectomy with ICG injection  Plan  Pap/HPV negative  Diet/exercise calcium/vitamin D  Return to office for annual exam  Patient may go back to work with no lifting for 2 weeks that she can go back to full activity    Subjective:      Patient ID: Katherin Barragan is a 39 y.o. female.    HPI  Patient seen evaluated presented office today for post hysterectomy visit denies any complaint  Denies any urgency frequency or dysuria  Denies any problem with bowel movements  Denies any vaginal bleeding  Denies any pelvic pain  Patient has chronic history of hemorrhoid    The following portions of the patient's history were reviewed and updated as appropriate: allergies, current medications, past family history, past medical history, past social history, past surgical history and problem list.    Review of Systems      Objective:      /60 (BP Location: Left arm, Patient Position: Sitting, Cuff Size: Adult)   Ht 4' 11\" (1.499 m)   Wt 49.7 kg (109 lb 9.6 oz)   LMP 02/13/2025 (Exact Date)   BMI 22.14 kg/m²          Physical Exam  Constitutional:       Appearance: She is well-developed.   Abdominal:      General: There is no distension.      Palpations: Abdomen is soft.      Tenderness: There is no abdominal tenderness.   Genitourinary:     Labia:         Right: No rash, tenderness or lesion.         Left: No rash, tenderness or lesion.       Vagina: No signs of injury. No vaginal discharge, erythema or tenderness.      Adnexa:         Right: No mass, tenderness or fullness.          Left: No mass, tenderness or fullness.        Comments: Vaginal cuff is healing well and " intact  Neurological:      Mental Status: She is alert and oriented to person, place, and time.   Psychiatric:         Behavior: Behavior normal.

## 2025-05-06 ENCOUNTER — OFFICE VISIT (OUTPATIENT)
Dept: GASTROENTEROLOGY | Facility: CLINIC | Age: 40
End: 2025-05-06
Payer: COMMERCIAL

## 2025-05-06 VITALS
TEMPERATURE: 98.7 F | HEART RATE: 74 BPM | DIASTOLIC BLOOD PRESSURE: 60 MMHG | WEIGHT: 114 LBS | OXYGEN SATURATION: 99 % | SYSTOLIC BLOOD PRESSURE: 110 MMHG | BODY MASS INDEX: 22.98 KG/M2 | RESPIRATION RATE: 18 BRPM | HEIGHT: 59 IN

## 2025-05-06 DIAGNOSIS — R16.0 HEPATOMEGALY: ICD-10-CM

## 2025-05-06 DIAGNOSIS — E06.3 HYPOTHYROIDISM DUE TO HASHIMOTO THYROIDITIS: ICD-10-CM

## 2025-05-06 DIAGNOSIS — R93.2 ABNORMAL FINDINGS ON DIAGNOSTIC IMAGING OF LIVER AND BILIARY TRACT: Primary | ICD-10-CM

## 2025-05-06 PROCEDURE — 99213 OFFICE O/P EST LOW 20 MIN: CPT | Performed by: FAMILY MEDICINE

## 2025-05-06 NOTE — PROGRESS NOTES
"Name: Katherin Barragan      : 1985      MRN: [id number]  Encounter Provider: Mariana Gabriel PA-C  Encounter Date: 2025   Encounter department: [facility]  :  Assessment & Plan  Abnormal findings on diagnostic imaging of liver and biliary tract  Patient w/ a hx of Filemon syndrome who recently underwent a hysterectomy for management of menorrhagia and was seen to have a \"gray\" and mildly enlarged appearing liver intraoperatively. No personal history of chronic liver disease. Her liver chemistries have been historically within normal limits. No clinical, serologic or radiographic evidence of chronic liver disease.    Plan for an evaluation of her liver function and anatomy including LFTs (CBC, CMP, INR) as well as a complete abdominal ultrasound with elastography. Will also screen for viral hepatitis and check her iron studies given recent iron supplementation in anticipation of her surgery. Will also explore correlations regarding Filemon syndrome and liver disease.     If her workup is unremarkable then would have low suspicion that her liver is intraoperative appearance is of clinical significance. Further recommendations pending the completion of her workup.    Orders:  •  CBC and differential; Future  •  Comprehensive metabolic panel; Future  •  Protime-INR; Future  •  Chronic Hepatitis Panel; Future  •  US abdomen complete; Future  •  US elastography/UGAP; Future  •  Iron Panel (Includes Ferritin, Iron Sat%, Iron, and TIBC); Future    Hepatomegaly  Refer to A/P above.        Follow-up in 3 months or sooner if necessary.      History of Present Illness   HPI    Katherin Barragan is a 39 y.o. female with PMH significant for congenital heart disease, nonrheumatic pulmonary valve stenosis, hypothyroidism, [place]ld[place] syndrome, iron deficiency anemia secondary to menorrhagia s/p hysterectomy and anxiety with depression who presents today for a consultation " "regarding an abnormal appearing liver.    Katherin is familiar with [facility] gastroenterology seen in August 2023 for the evaluation of anemia. She had both an EGD and colonoscopy (9/29/2023) which was largely unremarkable. Her anemia was thought to be secondary to menorrhagia and she recently had a total hysterectomy (3/5/2025) at which time she was noted to have a \"gray\" appearing liver intraoperatively. Per review of operative notes her liver appeared mildly enlarged but there is no mention of gray coloring. Her liver function tests are historically within normal limits. Also with normal platelet count.     Denies a personal history of chronic liver disease. Denies a family history of liver disease or liver-related cancers. Denies any known past or current infection with viral hepatitis. Denies being treated for any autoimmune conditions. Denies taking any herbal supplements, performance-enhancing drugs, or OTC/prescription medications not reflected on their med list. Denies EtOH use.       History obtained from: patient    Review of Systems   All other systems reviewed and are negative.    Pertinent Medical History     Medical History Reviewed by provider this encounter:  Tobacco  Allergies  Meds  Problems  Med Hx  Surg Hx  Fam Hx     .  Past Medical History   Past Medical History:   Diagnosis Date   • Anemia    • Anxiety 04/12/2016   • Asthma    • Cardiac disease     pulmonary stenosis   • Coronary artery disease 1985   • Depression 09/03/2020   • Disease of thyroid gland     hypothyroidism   • Fibrocystic breast    • Hypothyroidism    • Lumbar back pain with radiculopathy affecting left lower extremity 09/03/2020   • Lumbar back pain with radiculopathy affecting right lower extremity 09/03/2020   • [place] syndrome    • Pulmonary stenosis      Past Surgical History:   Procedure Laterality Date   • BREAST BIOPSY Left    • CARDIAC SURGERY      pulmonary stenosis   • COLONOSCOPY     • HEMORRHOID " SURGERY     • CT CYSTOURETHROSCOPY N/A 03/05/2025    Procedure: CYSTOSCOPY;  Surgeon: Luz Coronado MD;  Location: [facility];  Service: Gynecology   • CT EXCISION TUMOR SOFT TISSUE BACK/FLANK SUBQ <3CM Left 02/07/2025    Procedure: EXCISION  BIOPSY LESION/MASS BACK;  Surgeon: Forest Jerry DO;  Location: [facility];  Service: General   • CT LAPS TOTAL HYSTERECT 250 GM/< W/RMVL TUBE/OVARY Bilateral 03/05/2025    Procedure: EXAM UNDER ANESTHESIA; HYSTERECTOMY LAPAROSCOPIC TOTAL (LTH) W/ ROBOTICS AND SALPINGECTOMY.;  Surgeon: Luz Coronado MD;  Location: [facility];  Service: Gynecology   • WISDOM TOOTH EXTRACTION       Family History   Problem Relation Age of Onset   • Thyroid disease Mother    • Asthma Mother         As a child   • Hypothyroidism Mother    • Diabetes Father    • Stroke Father       reports that she has quit smoking. Her smoking use included cigarettes. She has a 2.5 pack-year smoking history. She has been exposed to tobacco smoke. She has never used smokeless tobacco. She reports current alcohol use. She reports that she does not use drugs.  Current Outpatient Medications   Medication Instructions   • albuterol (PROVENTIL HFA,VENTOLIN HFA) 90 mcg/act inhaler INHALE 2 PUFFS EVERY 6 HOURS AS NEEDED FOR WHEEZING   • docusate sodium (COLACE) 100 mg, Oral, Daily   • ferrous sulfate 325 mg, 3 times daily with meals   • Fluticasone-Salmeterol (Advair) 250-50 mcg/dose inhaler 1 puff, Inhalation, 2 times daily, Rinse mouth after use.   • ibuprofen (MOTRIN) 600 mg, Oral, Every 6 hours PRN   • Synthroid 150 mcg, Oral, Daily     Allergies   Allergen Reactions   • Bee Venom Hives and Swelling     Swelling at site   • Pollen Extract Nasal Congestion      Current Outpatient Medications on File Prior to Visit   Medication Sig Dispense Refill   • albuterol (PROVENTIL HFA,VENTOLIN HFA) 90 mcg/act inhaler INHALE 2 PUFFS EVERY 6 HOURS AS NEEDED FOR WHEEZING 8.5 g 0   • docusate sodium (COLACE) 100 mg capsule  "Take 1 capsule (100 mg total) by mouth daily 10 capsule 1   • Fluticasone-Salmeterol (Advair) 250-50 mcg/dose inhaler Inhale 1 puff 2 (two) times a day Rinse mouth after use.     • Synthroid 150 MCG tablet Take 1 tablet (150 mcg total) by mouth daily 90 tablet 0   • ferrous sulfate 325 (65 FE) MG EC tablet Take 325 mg by mouth 3 (three) times a day with meals (Patient not taking: Reported on 4/16/2025)     • ibuprofen (MOTRIN) 600 mg tablet Take 1 tablet (600 mg total) by mouth every 6 (six) hours as needed for mild pain (Patient not taking: Reported on 4/16/2025) 30 tablet 0     No current facility-administered medications on file prior to visit.      Social History     Tobacco Use   • Smoking status: Former     Current packs/day: 0.50     Average packs/day: 0.5 packs/day for 5.0 years (2.5 ttl pk-yrs)     Types: Cigarettes     Passive exposure: Past   • Smokeless tobacco: Never   Vaping Use   • Vaping status: Never Used   Substance and Sexual Activity   • Alcohol use: Yes     Comment: 1 x month   • Drug use: Never   • Sexual activity: Not Currently     Partners: Male     Birth control/protection: Abstinence, None        Objective   /60 (BP Location: Left arm, Patient Position: Sitting, Cuff Size: Standard)   Pulse 74   Temp 98.7 °F (37.1 °C) (Temporal)   Resp 18   Ht 4' 11\" (1.499 m)   Wt 51.7 kg (114 lb)   SpO2 99%   BMI 23.03 kg/m²      Physical Exam  Vitals and nursing note reviewed.   Constitutional:       General: She is not in acute distress.     Appearance: Normal appearance. She is well-developed. She is not ill-appearing or toxic-appearing.   HENT:      Head: Normocephalic and atraumatic.   Eyes:      General: No scleral icterus.     Conjunctiva/sclera: Conjunctivae normal.   Cardiovascular:      Rate and Rhythm: Regular rhythm.      Heart sounds: Normal heart sounds.   Pulmonary:      Effort: Pulmonary effort is normal.   Abdominal:      General: Bowel sounds are normal. There is no " distension.      Palpations: Abdomen is soft. There is no mass.      Tenderness: There is no abdominal tenderness.   Musculoskeletal:      Right lower leg: No edema.      Left lower leg: No edema.   Skin:     General: Skin is warm and dry.      Coloration: Skin is not jaundiced.      Findings: No bruising or rash.   Neurological:      Mental Status: She is alert and oriented to person, place, and time. Mental status is at baseline.   Psychiatric:         Mood and Affect: Mood normal.         Behavior: Behavior normal.

## 2025-05-08 DIAGNOSIS — E06.3 HYPOTHYROIDISM DUE TO HASHIMOTO THYROIDITIS: Primary | ICD-10-CM

## 2025-05-08 RX ORDER — LEVOTHYROXINE SODIUM 150 MCG
150 TABLET ORAL DAILY
Qty: 90 TABLET | Refills: 0 | OUTPATIENT
Start: 2025-05-08 | End: 2025-08-06

## 2025-05-08 RX ORDER — LEVOTHYROXINE SODIUM 150 UG/1
150 TABLET ORAL
Qty: 100 TABLET | Refills: 0 | Status: SHIPPED | OUTPATIENT
Start: 2025-05-08

## 2025-05-28 ENCOUNTER — HOSPITAL ENCOUNTER (OUTPATIENT)
Dept: ULTRASOUND IMAGING | Facility: HOSPITAL | Age: 40
Discharge: HOME | End: 2025-05-28
Payer: COMMERCIAL

## 2025-05-28 ENCOUNTER — HOSPITAL ENCOUNTER (OUTPATIENT)
Dept: ULTRASOUND IMAGING | Facility: HOSPITAL | Age: 40
Discharge: HOME | End: 2025-05-28
Attending: FAMILY MEDICINE
Payer: COMMERCIAL

## 2025-05-28 DIAGNOSIS — R93.2 ABNORMAL FINDINGS ON DIAGNOSTIC IMAGING OF LIVER AND BILIARY TRACT: ICD-10-CM

## 2025-05-28 PROCEDURE — 76700 US EXAM ABDOM COMPLETE: CPT

## 2025-05-28 PROCEDURE — 76981 USE PARENCHYMA: CPT

## 2025-05-31 ENCOUNTER — APPOINTMENT (OUTPATIENT)
Dept: LAB | Facility: CLINIC | Age: 40
End: 2025-05-31
Payer: COMMERCIAL

## 2025-05-31 DIAGNOSIS — R93.2 ABNORMAL FINDINGS ON DIAGNOSTIC IMAGING OF LIVER AND BILIARY TRACT: ICD-10-CM

## 2025-05-31 LAB
ALBUMIN SERPL BCG-MCNC: 4.6 G/DL (ref 3.5–5)
ALP SERPL-CCNC: 55 U/L (ref 34–104)
ALT SERPL W P-5'-P-CCNC: 20 U/L (ref 7–52)
ANION GAP SERPL CALCULATED.3IONS-SCNC: 9 MMOL/L (ref 4–13)
AST SERPL W P-5'-P-CCNC: 22 U/L (ref 13–39)
BASOPHILS # BLD AUTO: 0.02 THOUSANDS/ÂΜL (ref 0–0.1)
BASOPHILS NFR BLD AUTO: 1 % (ref 0–1)
BILIRUB SERPL-MCNC: 0.52 MG/DL (ref 0.2–1)
BUN SERPL-MCNC: 11 MG/DL (ref 5–25)
CALCIUM SERPL-MCNC: 9.1 MG/DL (ref 8.4–10.2)
CHLORIDE SERPL-SCNC: 105 MMOL/L (ref 96–108)
CO2 SERPL-SCNC: 24 MMOL/L (ref 21–32)
CREAT SERPL-MCNC: 0.76 MG/DL (ref 0.6–1.3)
EOSINOPHIL # BLD AUTO: 0.2 THOUSAND/ÂΜL (ref 0–0.61)
EOSINOPHIL NFR BLD AUTO: 6 % (ref 0–6)
ERYTHROCYTE [DISTWIDTH] IN BLOOD BY AUTOMATED COUNT: 18 % (ref 11.6–15.1)
FERRITIN SERPL-MCNC: 9 NG/ML (ref 30–307)
GFR SERPL CREATININE-BSD FRML MDRD: 99 ML/MIN/1.73SQ M
GLUCOSE P FAST SERPL-MCNC: 81 MG/DL (ref 65–99)
HCT VFR BLD AUTO: 39.4 % (ref 34.8–46.1)
HGB BLD-MCNC: 12.2 G/DL (ref 11.5–15.4)
IMM GRANULOCYTES # BLD AUTO: 0.03 THOUSAND/UL (ref 0–0.2)
IMM GRANULOCYTES NFR BLD AUTO: 1 % (ref 0–2)
INR PPP: 1.03 (ref 0.85–1.19)
IRON SATN MFR SERPL: 16 % (ref 15–50)
IRON SERPL-MCNC: 68 UG/DL (ref 50–212)
LYMPHOCYTES # BLD AUTO: 0.79 THOUSANDS/ÂΜL (ref 0.6–4.47)
LYMPHOCYTES NFR BLD AUTO: 25 % (ref 14–44)
MCH RBC QN AUTO: 24.8 PG (ref 26.8–34.3)
MCHC RBC AUTO-ENTMCNC: 31 G/DL (ref 31.4–37.4)
MCV RBC AUTO: 80 FL (ref 82–98)
MONOCYTES # BLD AUTO: 0.44 THOUSAND/ÂΜL (ref 0.17–1.22)
MONOCYTES NFR BLD AUTO: 14 % (ref 4–12)
NEUTROPHILS # BLD AUTO: 1.72 THOUSANDS/ÂΜL (ref 1.85–7.62)
NEUTS SEG NFR BLD AUTO: 53 % (ref 43–75)
NRBC BLD AUTO-RTO: 0 /100 WBCS
PLATELET # BLD AUTO: 239 THOUSANDS/UL (ref 149–390)
PMV BLD AUTO: 10.8 FL (ref 8.9–12.7)
POTASSIUM SERPL-SCNC: 3.7 MMOL/L (ref 3.5–5.3)
PROT SERPL-MCNC: 7.9 G/DL (ref 6.4–8.4)
PROTHROMBIN TIME: 13.8 SECONDS (ref 12.3–15)
RBC # BLD AUTO: 4.91 MILLION/UL (ref 3.81–5.12)
SODIUM SERPL-SCNC: 138 MMOL/L (ref 135–147)
TIBC SERPL-MCNC: 431.2 UG/DL (ref 250–450)
TRANSFERRIN SERPL-MCNC: 308 MG/DL (ref 203–362)
UIBC SERPL-MCNC: 363 UG/DL (ref 155–355)
WBC # BLD AUTO: 3.2 THOUSAND/UL (ref 4.31–10.16)

## 2025-05-31 PROCEDURE — 86704 HEP B CORE ANTIBODY TOTAL: CPT

## 2025-05-31 PROCEDURE — 86705 HEP B CORE ANTIBODY IGM: CPT

## 2025-05-31 PROCEDURE — 86803 HEPATITIS C AB TEST: CPT

## 2025-05-31 PROCEDURE — 36415 COLL VENOUS BLD VENIPUNCTURE: CPT

## 2025-05-31 PROCEDURE — 82728 ASSAY OF FERRITIN: CPT

## 2025-05-31 PROCEDURE — 83550 IRON BINDING TEST: CPT

## 2025-05-31 PROCEDURE — 83540 ASSAY OF IRON: CPT

## 2025-05-31 PROCEDURE — 85025 COMPLETE CBC W/AUTO DIFF WBC: CPT

## 2025-05-31 PROCEDURE — 85610 PROTHROMBIN TIME: CPT

## 2025-05-31 PROCEDURE — 87340 HEPATITIS B SURFACE AG IA: CPT

## 2025-05-31 PROCEDURE — 80053 COMPREHEN METABOLIC PANEL: CPT

## 2025-06-01 LAB
HBV CORE AB SER QL: NORMAL
HBV CORE IGM SER QL: NORMAL
HBV SURFACE AG SER QL: NORMAL
HCV AB SER QL: NORMAL

## (undated) DEVICE — SPECIMEN CONTAINER STERILE PEEL PACK

## (undated) DEVICE — MARYLAND BIPOLAR FORCEPS: Brand: ENDOWRIST

## (undated) DEVICE — TIP COVER ACCESSORY

## (undated) DEVICE — GLOVE INDICATOR PI UNDERGLOVE SZ 6.5 BLUE

## (undated) DEVICE — PAD GROUNDING DUAL ADULT

## (undated) DEVICE — Device

## (undated) DEVICE — CHLORAPREP HI-LITE 26ML ORANGE

## (undated) DEVICE — SUT MONOCRYL 4-0 PS-2 27 IN Y426H

## (undated) DEVICE — INTENDED FOR TISSUE SEPARATION, AND OTHER PROCEDURES THAT REQUIRE A SHARP SURGICAL BLADE TO PUNCTURE OR CUT.: Brand: BARD-PARKER SAFETY BLADES SIZE 15, STERILE

## (undated) DEVICE — PREMIUM DRY TRAY LF: Brand: MEDLINE INDUSTRIES, INC.

## (undated) DEVICE — GLOVE PI ULTRA TOUCH SZ.6.5

## (undated) DEVICE — INTENDED FOR TISSUE SEPARATION, AND OTHER PROCEDURES THAT REQUIRE A SHARP SURGICAL BLADE TO PUNCTURE OR CUT.: Brand: BARD-PARKER SAFETY BLADES SIZE 11, STERILE

## (undated) DEVICE — CATH URETERAL 5FR X 70 CM FLEX TIP POLYUR BARD

## (undated) DEVICE — UTERINE MANIPULATOR RUMI 5.1 X 6 CM

## (undated) DEVICE — DRAPE EQUIPMENT RF WAND

## (undated) DEVICE — CYSTO TUBING SINGLE IRRIGATION

## (undated) DEVICE — PROGRASP FORCEPS: Brand: ENDOWRIST

## (undated) DEVICE — POOLE SUCTION HANDLE: Brand: CARDINAL HEALTH

## (undated) DEVICE — SYRINGE 5ML LL

## (undated) DEVICE — BETHLEHEM UNIVERSAL MINOR GEN: Brand: CARDINAL HEALTH

## (undated) DEVICE — TRAY FOLEY 16FR URIMETER SILICONE SURESTEP

## (undated) DEVICE — LIGAMAX 5 MM ENDOSCOPIC MULTIPLE CLIP APPLIER: Brand: LIGAMAX

## (undated) DEVICE — GLOVE SRG BIOGEL 6.5

## (undated) DEVICE — VISUALIZATION SYSTEM: Brand: CLEARIFY

## (undated) DEVICE — DRAPE SHEET THREE QUARTER

## (undated) DEVICE — HEMOSTATIC MATRIX SURGIFLO 8ML W/THROMBIN

## (undated) DEVICE — ADHESIVE SKIN HIGH VISCOSITY EXOFIN 1ML

## (undated) DEVICE — LARGE NEEDLE DRIVER: Brand: ENDOWRIST

## (undated) DEVICE — PLUMEPEN PRO 10FT

## (undated) DEVICE — SYRINGE 10ML LL

## (undated) DEVICE — SUT STRATAFIX SPIRAL 2-0 CT-1 30 CM SXPP1B410

## (undated) DEVICE — 4-PORT MANIFOLD: Brand: NEPTUNE 2

## (undated) DEVICE — SURGICEL 4 X 8IN

## (undated) DEVICE — AIRSEAL TUBE SMOKE EVAC LUMENX3 FILTERED

## (undated) DEVICE — MAYO STAND COVER: Brand: CONVERTORS

## (undated) DEVICE — NEEDLE 25G X 1 1/2

## (undated) DEVICE — SYRINGE 50ML LL

## (undated) DEVICE — HEMOSTAT POWDER ADSORB SURGICEL 3GM

## (undated) DEVICE — ARM DRAPE

## (undated) DEVICE — STRL ALLENTOWN HYSTEROSCOPY PK: Brand: CARDINAL HEALTH

## (undated) DEVICE — EXOFIN PRECISION PEN HIGH VISCOSITY TOPICAL SKIN ADHESIVE: Brand: EXOFIN PRECISION PEN, 1G

## (undated) DEVICE — ASTOUND STANDARD SURGICAL GOWN, XL: Brand: CONVERTORS

## (undated) DEVICE — STOPCOCK 3-WAY

## (undated) DEVICE — DECANTER: Brand: UNBRANDED

## (undated) DEVICE — COLUMN DRAPE

## (undated) DEVICE — GAUZE SPONGES,USP TYPE VII GAUZE, 12 PLY: Brand: CURITY

## (undated) DEVICE — GLOVE INDICATOR PI UNDERGLOVE SZ 7 BLUE

## (undated) DEVICE — 1820 FOAM BLOCK NEEDLE COUNTER: Brand: DEVON

## (undated) DEVICE — 40595 XL TRENDELENBURG POSITIONING KIT: Brand: 40595 XL TRENDELENBURG POSITIONING KIT

## (undated) DEVICE — OCCLUDER COLPO-PNEUMO

## (undated) DEVICE — IRRIG ENDO FLO TUBING

## (undated) DEVICE — SUT VICRYL 0 UR-6 27 IN J603H

## (undated) DEVICE — ELECTRO LUBE IS A SINGLE PATIENT USE DEVICE THAT IS INTENDED TO BE USED ON ELECTROSURGICAL ELECTRODES TO REDUCE STICKING.: Brand: KEY SURGICAL ELECTRO LUBE

## (undated) DEVICE — SUT VICRYL 0 CT-1 27 IN J260H

## (undated) DEVICE — TROCAR: Brand: KII FIOS FIRST ENTRY

## (undated) DEVICE — SPONGE 4 X 4 XRAY 16 PLY STRL LF RFD

## (undated) DEVICE — TUBING SUCTION 5MM X 12 FT

## (undated) DEVICE — 3M™ STERI-STRIP™ REINFORCED ADHESIVE SKIN CLOSURES, R1547, 1/2 IN X 4 IN (12 MM X 100 MM), 6 STRIPS/ENVELOPE: Brand: 3M™ STERI-STRIP™

## (undated) DEVICE — HEAVY DUTY TABLE COVER: Brand: CONVERTORS

## (undated) DEVICE — APPLICATOR ENDO SURGICEL

## (undated) DEVICE — MONOPOLAR CURVED SCISSORS: Brand: ENDOWRIST

## (undated) DEVICE — SCD SEQUENTIAL COMPRESSION COMFORT SLEEVE MEDIUM KNEE LENGTH: Brand: KENDALL SCD

## (undated) DEVICE — BLADELESS OBTURATOR: Brand: WECK VISTA

## (undated) DEVICE — CANNULA SEAL

## (undated) DEVICE — SUT STRATAFIX SMTR PDS PLUS 2-0 SH 18IN SXPP1A409

## (undated) DEVICE — CHLORHEXIDINE 4PCT 4 OZ

## (undated) DEVICE — 3M™ TEGADERM™ CHG DRESSING 25/CARTON 4 CARTONS/CASE 1659: Brand: TEGADERM™